# Patient Record
Sex: MALE | Race: WHITE | NOT HISPANIC OR LATINO | URBAN - METROPOLITAN AREA
[De-identification: names, ages, dates, MRNs, and addresses within clinical notes are randomized per-mention and may not be internally consistent; named-entity substitution may affect disease eponyms.]

---

## 2018-04-30 ENCOUNTER — TELEPHONE (OUTPATIENT)
Dept: TRANSPLANT | Facility: CLINIC | Age: 58
End: 2018-04-30

## 2018-04-30 DIAGNOSIS — Z00.5 TRANSPLANT DONOR EVALUATION: Primary | ICD-10-CM

## 2018-04-30 NOTE — TELEPHONE ENCOUNTER
"[employer] BREEZE  [id number]      LIVING KIDNEY DONOR EVALUATION  Donor First Name Willie Donor MRN    Donor Last Name Ladonna Completed 2018 4:36 PM    1960 Record ID [id number]   BREEZE Screen PASSED     Intended Recipient  Recipient First Name Kaiser Recipient MRN    Recipient Last Name Aeling Relationship Close Friend   Recipient  1944-10-19 Recipient Diagnosis    Recipient's ABO      Donor Information  Age 58 Gender Male   Ht 170 cm (5' 7'') Race Not Specified,    Wt 74.8 kg (165 lbs) Ethnicity Not /   BMI 25.90 kg/m  Preferred Language English      Required No     Blood Type Unknown   Demographics  Home Address [address] [place] [address] [address] [facility] # [phone]   [facility] Type [facility] Alternate #    [facility] Code [address] Type    Country [place] Preferred Contact day Mon, Fri, Thur, Wed, Tue   Email [email or web address] Preferred Contact time 11:00 AM-1:00 PM, 1:00 PM-4:00 PM   Donor's Medical Information  Medical History None Reported Medications None Reported   Surgical History Tonsillectomy and/or Adenoidectomy Allergies NKDA   Social History EtOH: Occasional (1-2 drinks/week)   Illicit Drug Use: Denies   Tobacco: Denies Self-Reported Functional Status \"I am able to participate in strenuous sports such as swimming, singles tennis, football, basketball, or skiing\"   Family Medical History Cancer (denies)   Diabetes (Sibling)   Heart Disease (denies)   Hypertension (Mother)   Kidney Disease (denies)   Kidney Stones (denies) Exercise Frequency Exercise (3 X per week)   Review of Organ Systems  Review of Systems Airway or Lungs: No   Blood Disorder: No   Cancer: No   Diabetes,Thyroid,Adrenal,Endocrine Disorder: No   Digestive or Liver: No   Heart or Circulatory System: No   Immune Diseases: No   Kidneys and Bladder: No   Male Health: No   Muscles,Bones,Joints: No   Neuro: No   Psych: No   Donor's Social Information  Marital Status Single Living " Accommodation Lives in rented accommodation   Level of Education Graduate or professional degree complete Living Arrangement Alone   Employment Status Self Employed Concerns: health and life insurance No   Employer  Concerns: job security and lost income No   Occupation      Medical Insurance Status Has medical insurance     High Risk Behavior  High Risk Behaviors Blood transfusion < 12 months. (NO)   Commercial sex < 12 months. (NO)   Illicit IV drug use < 5yrs. (NO)   Male:male sexual contact < 5yrs. (YES)   Other high risk sexual contact < 12 months. (NO)   Reason for Donation  Referral Self Researched Reason for Donation I would like to donate in order to help my friend Randy, so that it might prolong his life expantancy   Permission to Disclose Inquiry Yes Patient Comments not at this time   Donor Motivation Level Ready to start evaluation with reservations     PCP Contact  PCP Name    PCP [facility]    PCP State    PCP Phone    Emergency Contact  First Name Raquel First Name Yohannes   Last Name Jorge Last Name Francine   Phone # [phone] Phone # [phone]   Phone Type Mobile Phone Type Mobile   Relationship Friend or Other Relationship Friend or Other   Office Use  Reviewed By Gretel Palacio   Reviewed 4/30/2018 8:38 AM   Admin Folder Accept   Comments 4/30 - Passed Eval   Lost for Followup    Extended Comments    BREEZE ID [facility].transplant.combined:[id number] survey status completed   Activity History  Call  Task    Due Date 4/30/2018   Last Modified Date/Time 4/30/2018 11:41 AM   Comments    Call  Task    Due Date 4/30/2018   Last Modified Date/Time 4/30/2018 11:40 AM   Comments

## 2018-05-15 ENCOUNTER — TELEPHONE (OUTPATIENT)
Dept: TRANSPLANT | Facility: CLINIC | Age: 58
End: 2018-05-15

## 2018-05-15 ENCOUNTER — DOCUMENTATION ONLY (OUTPATIENT)
Dept: TRANSPLANT | Facility: CLINIC | Age: 58
End: 2018-05-15

## 2018-05-15 NOTE — PROGRESS NOTES
Willie called to say he is ready to move on toward doing his phase 12 testing.  He has an appointment and I explained that Lucila will review the labs and blood pressures and if everything is ok and recipient is ready for transplant we will invite him for eval.   Willie verbalized understanding.

## 2018-05-23 ENCOUNTER — DOCUMENTATION ONLY (OUTPATIENT)
Dept: TRANSPLANT | Facility: CLINIC | Age: 58
End: 2018-05-23

## 2018-05-23 ENCOUNTER — TELEPHONE (OUTPATIENT)
Dept: TRANSPLANT | Facility: CLINIC | Age: 58
End: 2018-05-23

## 2018-05-23 DIAGNOSIS — Z00.5 TRANSPLANT DONOR EVALUATION: ICD-10-CM

## 2018-05-23 NOTE — TELEPHONE ENCOUNTER
Informed Willie his Phase 1's are OK. Per recip coordinator it's OK to bring donor for eval. Has CD. [place] Has not left [place] in past 3 months.

## 2018-06-08 ENCOUNTER — OFFICE VISIT (OUTPATIENT)
Dept: TRANSPLANT | Facility: CLINIC | Age: 58
End: 2018-06-08
Attending: TRANSPLANT SURGERY

## 2018-06-08 ENCOUNTER — APPOINTMENT (OUTPATIENT)
Dept: TRANSPLANT | Facility: CLINIC | Age: 58
End: 2018-06-08
Attending: TRANSPLANT SURGERY

## 2018-06-08 ENCOUNTER — ALLIED HEALTH/NURSE VISIT (OUTPATIENT)
Dept: TRANSPLANT | Facility: CLINIC | Age: 58
End: 2018-06-08
Attending: TRANSPLANT SURGERY

## 2018-06-08 ENCOUNTER — INFUSION THERAPY VISIT (OUTPATIENT)
Dept: INFUSION THERAPY | Facility: CLINIC | Age: 58
End: 2018-06-08
Attending: INTERNAL MEDICINE

## 2018-06-08 ENCOUNTER — OFFICE VISIT (OUTPATIENT)
Dept: NEPHROLOGY | Facility: CLINIC | Age: 58
End: 2018-06-08
Attending: INTERNAL MEDICINE

## 2018-06-08 VITALS
WEIGHT: 169 LBS | OXYGEN SATURATION: 99 % | RESPIRATION RATE: 20 BRPM | DIASTOLIC BLOOD PRESSURE: 93 MMHG | TEMPERATURE: 97.7 F | BODY MASS INDEX: 27.16 KG/M2 | HEART RATE: 108 BPM | HEIGHT: 66 IN | SYSTOLIC BLOOD PRESSURE: 147 MMHG

## 2018-06-08 DIAGNOSIS — Z53.9 ERRONEOUS ENCOUNTER--DISREGARD: ICD-10-CM

## 2018-06-08 DIAGNOSIS — Z00.5 TRANSPLANT DONOR EVALUATION: ICD-10-CM

## 2018-06-08 DIAGNOSIS — R74.01 ELEVATED AST (SGOT): ICD-10-CM

## 2018-06-08 DIAGNOSIS — Z00.5 TRANSPLANT DONOR EVALUATION: Primary | ICD-10-CM

## 2018-06-08 DIAGNOSIS — R80.9 MICROALBUMINURIA: ICD-10-CM

## 2018-06-08 DIAGNOSIS — I10 BENIGN ESSENTIAL HYPERTENSION: ICD-10-CM

## 2018-06-08 LAB
ABO + RH BLD: NORMAL
ABO + RH BLD: NORMAL
ALBUMIN SERPL-MCNC: 4.5 G/DL (ref 3.4–5)
ALBUMIN UR-MCNC: NEGATIVE MG/DL
ALP SERPL-CCNC: 74 U/L (ref 40–150)
ALT SERPL W P-5'-P-CCNC: 40 U/L (ref 0–70)
ANION GAP SERPL CALCULATED.3IONS-SCNC: 11 MMOL/L (ref 3–14)
APPEARANCE UR: CLEAR
APTT PPP: 36 SEC (ref 22–37)
AST SERPL W P-5'-P-CCNC: 48 U/L (ref 0–45)
BILIRUB SERPL-MCNC: 0.9 MG/DL (ref 0.2–1.3)
BILIRUB UR QL STRIP: NEGATIVE
BLD GP AB SCN SERPL QL: NORMAL
BLOOD BANK CMNT PATIENT-IMP: NORMAL
BUN SERPL-MCNC: 16 MG/DL (ref 7–30)
CALCIUM SERPL-MCNC: 9 MG/DL (ref 8.5–10.1)
CHLORIDE SERPL-SCNC: 106 MMOL/L (ref 94–109)
CHOLEST SERPL-MCNC: 184 MG/DL
CMV IGG SERPL QL IA: <0.2 AI (ref 0–0.8)
CO2 SERPL-SCNC: 22 MMOL/L (ref 20–32)
COLOR UR AUTO: YELLOW
CREAT SERPL-MCNC: 0.9 MG/DL (ref 0.66–1.25)
CREAT UR-MCNC: 33 MG/DL
EBV VCA IGG SER QL IA: 7 AI (ref 0–0.8)
ERYTHROCYTE [DISTWIDTH] IN BLOOD BY AUTOMATED COUNT: 12.8 % (ref 10–15)
GFR SERPL CREATININE-BSD FRML MDRD: 87 ML/MIN/1.7M2
GLUCOSE SERPL-MCNC: 79 MG/DL (ref 70–99)
GLUCOSE UR STRIP-MCNC: NEGATIVE MG/DL
HBA1C MFR BLD: 5.2 % (ref 0–5.6)
HCT VFR BLD AUTO: 44.7 % (ref 40–53)
HDLC SERPL-MCNC: 96 MG/DL
HGB BLD-MCNC: 15.1 G/DL (ref 13.3–17.7)
HGB UR QL STRIP: NEGATIVE
INR PPP: 0.96 (ref 0.86–1.14)
KETONES UR STRIP-MCNC: 5 MG/DL
LDLC SERPL CALC-MCNC: 72 MG/DL
LEUKOCYTE ESTERASE UR QL STRIP: NEGATIVE
MCH RBC QN AUTO: 32.2 PG (ref 26.5–33)
MCHC RBC AUTO-ENTMCNC: 33.8 G/DL (ref 31.5–36.5)
MCV RBC AUTO: 95 FL (ref 78–100)
MICROALBUMIN UR-MCNC: 13 MG/L
MICROALBUMIN/CREAT UR: 39.33 MG/G CR (ref 0–17)
MUCOUS THREADS #/AREA URNS LPF: PRESENT /LPF
NITRATE UR QL: NEGATIVE
NONHDLC SERPL-MCNC: 88 MG/DL
PH UR STRIP: 6 PH (ref 5–7)
PHOSPHATE SERPL-MCNC: 3.1 MG/DL (ref 2.5–4.5)
PLATELET # BLD AUTO: 265 10E9/L (ref 150–450)
POTASSIUM SERPL-SCNC: 4 MMOL/L (ref 3.4–5.3)
PROT SERPL-MCNC: 8 G/DL (ref 6.8–8.8)
PROT UR-MCNC: <0.05 G/L
PROT/CREAT 24H UR: NORMAL G/G CR (ref 0–0.2)
PSA SERPL-ACNC: 3.42 UG/L (ref 0–4)
RBC # BLD AUTO: 4.69 10E12/L (ref 4.4–5.9)
RBC #/AREA URNS AUTO: 1 /HPF (ref 0–2)
SODIUM SERPL-SCNC: 140 MMOL/L (ref 133–144)
SOURCE: ABNORMAL
SP GR UR STRIP: 1.02 (ref 1–1.03)
SPECIMEN EXP DATE BLD: NORMAL
T PALLIDUM AB SER QL: NONREACTIVE
TRIGL SERPL-MCNC: 79 MG/DL
URATE SERPL-MCNC: 5.8 MG/DL (ref 3.5–7.2)
UROBILINOGEN UR STRIP-MCNC: 2 MG/DL (ref 0–2)
WBC # BLD AUTO: 7.8 10E9/L (ref 4–11)
WBC #/AREA URNS AUTO: 0 /HPF (ref 0–5)

## 2018-06-08 PROCEDURE — G0103 PSA SCREENING: HCPCS | Performed by: INTERNAL MEDICINE

## 2018-06-08 PROCEDURE — 87340 HEPATITIS B SURFACE AG IA: CPT | Performed by: INTERNAL MEDICINE

## 2018-06-08 PROCEDURE — 84156 ASSAY OF PROTEIN URINE: CPT | Performed by: INTERNAL MEDICINE

## 2018-06-08 PROCEDURE — 85027 COMPLETE CBC AUTOMATED: CPT | Performed by: INTERNAL MEDICINE

## 2018-06-08 PROCEDURE — 86480 TB TEST CELL IMMUN MEASURE: CPT | Performed by: INTERNAL MEDICINE

## 2018-06-08 PROCEDURE — 84550 ASSAY OF BLOOD/URIC ACID: CPT | Performed by: INTERNAL MEDICINE

## 2018-06-08 PROCEDURE — 86704 HEP B CORE ANTIBODY TOTAL: CPT | Performed by: INTERNAL MEDICINE

## 2018-06-08 PROCEDURE — 82043 UR ALBUMIN QUANTITATIVE: CPT | Performed by: INTERNAL MEDICINE

## 2018-06-08 PROCEDURE — 86780 TREPONEMA PALLIDUM: CPT | Performed by: INTERNAL MEDICINE

## 2018-06-08 PROCEDURE — 86850 RBC ANTIBODY SCREEN: CPT | Performed by: INTERNAL MEDICINE

## 2018-06-08 PROCEDURE — 83036 HEMOGLOBIN GLYCOSYLATED A1C: CPT | Performed by: INTERNAL MEDICINE

## 2018-06-08 PROCEDURE — 80061 LIPID PANEL: CPT | Performed by: INTERNAL MEDICINE

## 2018-06-08 PROCEDURE — 86803 HEPATITIS C AB TEST: CPT | Performed by: INTERNAL MEDICINE

## 2018-06-08 PROCEDURE — 86900 BLOOD TYPING SEROLOGIC ABO: CPT | Performed by: INTERNAL MEDICINE

## 2018-06-08 PROCEDURE — 80053 COMPREHEN METABOLIC PANEL: CPT | Performed by: INTERNAL MEDICINE

## 2018-06-08 PROCEDURE — 86665 EPSTEIN-BARR CAPSID VCA: CPT | Performed by: INTERNAL MEDICINE

## 2018-06-08 PROCEDURE — 86901 BLOOD TYPING SEROLOGIC RH(D): CPT | Performed by: INTERNAL MEDICINE

## 2018-06-08 PROCEDURE — G0463 HOSPITAL OUTPT CLINIC VISIT: HCPCS | Mod: ZF

## 2018-06-08 PROCEDURE — 36415 COLL VENOUS BLD VENIPUNCTURE: CPT | Performed by: INTERNAL MEDICINE

## 2018-06-08 PROCEDURE — 86706 HEP B SURFACE ANTIBODY: CPT | Performed by: INTERNAL MEDICINE

## 2018-06-08 PROCEDURE — 86644 CMV ANTIBODY: CPT | Performed by: INTERNAL MEDICINE

## 2018-06-08 PROCEDURE — 81001 URINALYSIS AUTO W/SCOPE: CPT | Performed by: INTERNAL MEDICINE

## 2018-06-08 PROCEDURE — 85730 THROMBOPLASTIN TIME PARTIAL: CPT | Performed by: INTERNAL MEDICINE

## 2018-06-08 PROCEDURE — 84100 ASSAY OF PHOSPHORUS: CPT | Performed by: INTERNAL MEDICINE

## 2018-06-08 PROCEDURE — 40000866 ZZHCL STATISTIC HIV 1/2 ANTIGEN/ANTIBODY PRETRANSPLANT ONLY: Performed by: INTERNAL MEDICINE

## 2018-06-08 PROCEDURE — 85610 PROTHROMBIN TIME: CPT | Performed by: INTERNAL MEDICINE

## 2018-06-08 RX ORDER — NICOTINE POLACRILEX 4 MG/1
20 GUM, CHEWING ORAL DAILY PRN
COMMUNITY
Start: 2018-05-16

## 2018-06-08 RX ORDER — LORAZEPAM 1 MG/1
0.5 TABLET ORAL EVERY 4 HOURS PRN
Refills: 0 | COMMUNITY
Start: 2018-05-03

## 2018-06-08 ASSESSMENT — PAIN SCALES - GENERAL: PAINLEVEL: NO PAIN (0)

## 2018-06-08 NOTE — LETTER
Date:June 11, 2018      Patient was self referred, no letter generated. Do not send.        [facility] Health Information

## 2018-06-08 NOTE — MR AVS SNAPSHOT
"              After Visit Summary   2018    Willie Dougherty    MRN: [id number]           Patient Information     Date Of Birth          1960        Visit Information        Provider Department      2018 11:30 AM [facility]        Today's Diagnoses     Transplant donor evaluation    -  1       Follow-ups after your visit        Who to contact     If you have questions or need follow up information about today's clinic visit or your schedule please contact [facility] SOLID ORGAN TRANSPLANT directly at [phone].  Normal or non-critical lab and imaging results will be communicated to you by [email or web address].comhart, letter or phone within 4 business days after the clinic has received the results. If you do not hear from us within 7 days, please contact the clinic through [email or web address] or phone. If you have a critical or abnormal lab result, we will notify you by phone as soon as possible.  Submit refill requests through [employer] or call your pharmacy and they will forward the refill request to us. Please allow 3 business days for your refill to be completed.          Additional Information About Your Visit        MyChart Information     [employer] lets you send messages to your doctor, view your test results, renew your prescriptions, schedule appointments and more. To sign up, go to [email or web address] . Click on \"Log in\" on the left side of the screen, which will take you to the Welcome page. Then click on \"Sign up Now\" on the right side of the page.     You will be asked to enter the access code listed below, as well as some personal information. Please follow the directions to create your username and password.     Your access code is: [email or web address]  Expires: 2018  6:30 AM     Your access code will  in 90 days. If you need help or a new code, please call your [facility] or [phone].        Care EveryWhere ID     This is your Care EveryWhere ID. This could be used by " other organizations to access your [place] medical records  [id number]         Blood Pressure from Last 3 Encounters:   06/08/18 (!) 147/93    Weight from Last 3 Encounters:   06/08/18 76.7 kg (169 lb)              Today, you had the following     No orders found for display       Primary Care Provider Fax #    Physician No Ref-Primary [phone]       No address on file        Equal Access to Services     [facility]: Hadii aad ku hadasho Soomaali, waaxda luqadaha, qaybta kaalmada adeegyada, waxay galain hayaan adeeg russell laAdriánjovani . So [facility] [phone].    ATENCIÓN: Si habla español, tiene a rome disposición servicios gratuitos de asistencia lingüística. Llame al [phone].    We comply with applicable federal civil rights laws and [place] laws. We do not discriminate on the basis of race, color, national origin, age, disability, sex, sexual orientation, or gender identity.            Thank you!     Thank you for choosing [facility] SOLID ORGAN TRANSPLANT  for your care. Our goal is always to provide you with excellent care. Hearing back from our patients is one way we can continue to improve our services. Please take a few minutes to complete the written survey that you may receive in the mail after your visit with us. Thank you!             Your Updated Medication List - Protect others around you: Learn how to safely use, store and throw away your medicines at www.disposemymeds.org.          This list is accurate as of 6/8/18 11:59 PM.  Always use your most recent med list.                   Brand Name Dispense Instructions for use Diagnosis    LORazepam 1 MG tablet    ATIVAN     Take 0.5 mg by mouth every 4 hours as needed        omeprazole 20 MG tablet      Take 20 mg by mouth daily as needed

## 2018-06-08 NOTE — MR AVS SNAPSHOT
After Visit Summary   6/8/2018    Willie Dougherty    MRN: [id number]           Patient Information     Date Of Birth          1960        Visit Information        Provider Department      6/8/2018 9:00 AM UC 49 ATC; UC SPEC INFUSION [facility] Specialty and Procedure        Today's Diagnoses     Transplant donor evaluation    -  1    ERRONEOUS ENCOUNTER--DISREGARD           Follow-ups after your visit        Your next 10 appointments already scheduled     Jun 08, 2018  3:20 PM CDT   XR CHEST 2 VIEWS with UCXR1   [facility] [place] [facility] ([facility] Clinics [facility] Center)    [address] [address] [phone]           Please bring a list of your current medicines to your exam. (Include vitamins, minerals and over-thecounter medicines.) Leave your valuables at home.  Tell your doctor if there is a chance you may be pregnant.  You do not need to do anything special for this exam.              Who to contact     If you have questions or need follow up information about today's clinic visit or your schedule please contact [facility] SPECIALTY AND PROCEDURE directly at [phone].  Normal or non-critical lab and imaging results will be communicated to you by iCare Technologyhart, letter or phone within 4 business days after the clinic has received the results. If you do not hear from us within 7 days, please contact the clinic through [employer] or phone. If you have a critical or abnormal lab result, we will notify you by phone as soon as possible.  Submit refill requests through [employer] or call your pharmacy and they will forward the refill request to us. Please allow 3 business days for your refill to be completed.          Additional Information About Your Visit        Perfect [employer] Information     [employer] lets you send messages to your doctor, view your test results, renew your prescriptions, schedule appointments and more. To  "sign up, go to [email or web address] . Click on \"Log in\" on the left side of the screen, which will take you to the Welcome page. Then click on \"Sign up Now\" on the right side of the page.     You will be asked to enter the access code listed below, as well as some personal information. Please follow the directions to create your username and password.     Your access code is: [email or web address]  Expires: 2018  6:30 AM     Your access code will  in 90 days. If you need help or a new code, please call your [place] clinic or [phone].        Care EveryWhere ID     This is your Care EveryWhere ID. This could be used by other organizations to access your [place] medical records  [id number]         Blood Pressure from Last 3 Encounters:   18 (!) 147/93    Weight from Last 3 Encounters:   18 76.7 kg (169 lb)              Today, you had the following     No orders found for display       Primary Care Provider Fax #    Physician No Ref-Primary [phone]       No address on file        Equal Access to Services     [facility]: Hadii [address] Soalla, wafranciscoda kavya, qaybta kaalmaranulfo ordoñez, flako hall . So [facility] [phone].    ATENCIÓN: Si habla español, tiene a rome disposición servicios gratuitos de asistencia lingüística. Delroy al [phone].    We comply with applicable federal civil rights laws and [place] laws. We do not discriminate on the basis of race, color, national origin, age, disability, sex, sexual orientation, or gender identity.            Thank you!     Thank you for choosing [facility] SPECIALTY AND PROCEDURE  for your care. Our goal is always to provide you with excellent care. Hearing back from our patients is one way we can continue to improve our services. Please take a few minutes to complete the written survey that you may receive in the mail after your visit with us. Thank you!             Your Updated " Medication List - Protect others around you: Learn how to safely use, store and throw away your medicines at www.disposemymeds.org.          This list is accurate as of 6/8/18  3:05 PM.  Always use your most recent med list.                   Brand Name Dispense Instructions for use Diagnosis    LORazepam 1 MG tablet    ATIVAN     Take 0.5 mg by mouth every 4 hours as needed        omeprazole 20 MG tablet      Take 20 mg by mouth daily as needed

## 2018-06-08 NOTE — MR AVS SNAPSHOT
After Visit Summary   6/8/2018    Willie Dougherty    MRN: [id number]           Patient Information     Date Of Birth          1960        Visit Information        Provider Department      6/8/2018 1:00 PM Rebecca Joseph RD [facility] Solid Organ Transplant        Today's Diagnoses     Transplant donor evaluation    -  1       Follow-ups after your visit        Your next 10 appointments already scheduled     Jun 08, 2018  1:00 PM CDT   NUTRITION VISIT with Rebecca Joseph RD   [facility] Solid Organ Transplant ([facility])    [address]  [address]  [facility] [address]   [phone]            Jun 08, 2018  1:30 PM CDT   (Arrive by 1:15 PM)   SOT CARE COORDINATOR EVAL with Jennifer Collins RN   [facility] Solid Organ Transplant ([facility])    [address]  [address]  [place] [address]   [phone]            Jun 08, 2018  2:00 PM CDT   (Arrive by 1:30 PM)   Kidney Donor Evaluation with Raymond Carlson MD   [facility] Nephrology ([facility])    [address]  [address]  [facility] [address]   [phone]            Jun 08, 2018  3:00 PM CDT   CT RENAL ANGIO with UCCT1   [facility] Imaging [place] ([facility])    [address]  1st Floor  [place] [address]   [phone]           Please bring any scans or X-rays taken at other hospitals, if similar tests were done. Also bring a list of your medicines, including vitamins, minerals and over-the-counter drugs. It is safest to leave personal items at home.  Be sure to tell your doctor:   If you have any allergies.   If there s any chance you are pregnant.   If you are breastfeeding.    If you have diabetes as your medication may need to be adjusted for this exam.  You will have contrast for this exam. To prepare:   Do not eat or drink for 2 hours before your exam. If you need to take  medicine, you may take it with small sips of water. (We may ask you to take liquid medicine as well.)   The day before your exam, drink extra fluids at least six 8-ounce glasses (unless your doctor tells you to restrict your fluids).  Patients over 70 or patients with diabetes or kidney problems:   If you haven t had a blood test (creatinine test) within the last 30 days, the Cardiologist/Radiologist may require you to get this test prior to your exam.  Please wear loose clothing, such as a sweat suit or jogging clothes. Avoid snaps, zippers and other metal. We may ask you to undress and put on a hospital gown.  If you have any questions, please call the Imaging Department where you will have your exam.            Jun 08, 2018  3:20 PM CDT   XR CHEST 2 VIEWS with UCXR1   [facility] ([facility])    [address] [address] [phone]           Please bring a list of your current medicines to your exam. (Include vitamins, minerals and over-thecounter medicines.) Leave your valuables at home.  Tell your doctor if there is a chance you may be pregnant.  You do not need to do anything special for this exam.              Who to contact     If you have questions or need follow up information about today's clinic visit or your schedule please contact [facility] SOLID ORGAN TRANSPLANT directly at [phone].  Normal or non-critical lab and imaging results will be communicated to you by MyChart, letter or phone within 4 business days after the clinic has received the results. If you do not hear from us within 7 days, please contact the clinic through MyChart or phone. If you have a critical or abnormal lab result, we will notify you by phone as soon as possible.  Submit refill requests through [facility] or call your pharmacy and they will forward the refill request to us. Please allow 3 business days for your refill to be completed.           "Additional Information About Your Visit        MyChart Information     [employer] lets you send messages to your doctor, view your test results, renew your prescriptions, schedule appointments and more. To sign up, go to [email or web address] . Click on \"Log in\" on the left side of the screen, which will take you to the Welcome page. Then click on \"Sign up Now\" on the right side of the page.     You will be asked to enter the access code listed below, as well as some personal information. Please follow the directions to create your username and password.     Your access code is: [email or web address]  Expires: 2018  6:30 AM     Your access code will  in 90 days. If you need help or a new code, please call your [place] clinic or [phone].        Care EveryWhere ID     This is your Care EveryWhere ID. This could be used by other organizations to access your [place] medical records  [id number]         Blood Pressure from Last 3 Encounters:   No data found for BP    Weight from Last 3 Encounters:   No data found for Wt              Today, you had the following     No orders found for display       Primary Care Provider Fax #    Physician No Ref-Primary [phone]       No address on file        Equal Access to Services     EDUARDO MURILLO : Yris Nava, wafranciscoda kavya, qaybta kaalmada aderominayaranulfo, flako hall . So [facility] [phone].    ATENCIÓN: Si habla español, tiene a rome disposición servicios gratuitos de asistencia lingüística. Llame al [phone].    We comply with applicable federal civil rights laws and [place] laws. We do not discriminate on the basis of race, color, national origin, age, disability, sex, sexual orientation, or gender identity.            Thank you!     Thank you for choosing [facility] SOLID ORGAN TRANSPLANT  for your care. Our goal is always to provide you with excellent care. Hearing back from our patients is one way we can continue to " improve our services. Please take a few minutes to complete the written survey that you may receive in the mail after your visit with us. Thank you!             Your Updated Medication List - Protect others around you: Learn how to safely use, store and throw away your medicines at www.disposemymeds.org.          This list is accurate as of 6/8/18 12:55 PM.  Always use your most recent med list.                   Brand Name Dispense Instructions for use Diagnosis    LORazepam 1 MG tablet    ATIVAN     Take 0.5 mg by mouth every 4 hours as needed        omeprazole 20 MG tablet      Take 20 mg by mouth daily as needed

## 2018-06-08 NOTE — MR AVS SNAPSHOT
"              After Visit Summary   2018    Willie Dougherty    MRN: [id number]           Patient Information     Date Of Birth          1960        Visit Information        Provider Department      2018 10:30 AM Jamie Riggins MD [facility] Solid Organ Transplant        Today's Diagnoses     Transplant donor evaluation    -  1       Follow-ups after your visit        Who to contact     If you have questions or need follow up information about today's clinic visit or your schedule please contact [facility] SOLID ORGAN TRANSPLANT directly at [phone].  Normal or non-critical lab and imaging results will be communicated to you by [employer]hart, letter or phone within 4 business days after the clinic has received the results. If you do not hear from us within 7 days, please contact the clinic through [employer] or phone. If you have a critical or abnormal lab result, we will notify you by phone as soon as possible.  Submit refill requests through [employer] or call your pharmacy and they will forward the refill request to us. Please allow 3 business days for your refill to be completed.          Additional Information About Your Visit        MyChart Information     [employer] lets you send messages to your doctor, view your test results, renew your prescriptions, schedule appointments and more. To sign up, go to [email or web address] . Click on \"Log in\" on the left side of the screen, which will take you to the Welcome page. Then click on \"Sign up Now\" on the right side of the page.     You will be asked to enter the access code listed below, as well as some personal information. Please follow the directions to create your username and password.     Your access code is: [email or web address]  Expires: 2018  6:30 AM     Your access code will  in 90 days. If you need help or a new code, please call your [place] clinic or [phone].        Care EveryWhere ID     This is your Care EveryWhere ID. This could be used by other " organizations to access your [place] medical records  [id number]         Blood Pressure from Last 3 Encounters:   06/08/18 (!) 147/93    Weight from Last 3 Encounters:   06/08/18 76.7 kg (169 lb)              Today, you had the following     No orders found for display       Primary Care Provider Fax #    Physician No Ref-Primary [phone]       No address on file        Equal Access to Services     [facility]: Hadii aad ku hadasho Soomaali, waaxda luqadaha, qaybta kaalmada adeegyada, waxay galain haymarisn aderomina russell laAdriánjovani . So [facility] [phone].    ATENCIÓN: Si habla español, tiene a rome disposición servicios gratuitos de asistencia lingüística. Llame al [phone].    We comply with applicable federal civil rights laws and [place] laws. We do not discriminate on the basis of race, color, national origin, age, disability, sex, sexual orientation, or gender identity.            Thank you!     Thank you for choosing [facility] SOLID ORGAN TRANSPLANT  for your care. Our goal is always to provide you with excellent care. Hearing back from our patients is one way we can continue to improve our services. Please take a few minutes to complete the written survey that you may receive in the mail after your visit with us. Thank you!             Your Updated Medication List - Protect others around you: Learn how to safely use, store and throw away your medicines at www.disposemymeds.org.          This list is accurate as of 6/8/18  3:39 PM.  Always use your most recent med list.                   Brand Name Dispense Instructions for use Diagnosis    LORazepam 1 MG tablet    ATIVAN     Take 0.5 mg by mouth every 4 hours as needed        omeprazole 20 MG tablet      Take 20 mg by mouth daily as needed

## 2018-06-08 NOTE — PROGRESS NOTES
Assessment and Plan:  1. Prospective kidney transplant donor with one issue that needs to be addressed prior to donation. Patient's blood pressure is elevated at this visit, kidney function appears to be acceptable with Iohexol needing to be performed, and urinalysis has microalbuminuria.    Microalbuminuria: The patient has 39 mg/g of creatinine albuminuria present today.  He will need this repeated in 6 weeks to be able to see if it is persistent.  If persistent I would not recommend that this patient to proceed with donation.    Hypertension: Patient's blood pressure on evaluation with serial evaluations shows an elevation to 153/95.  I have recommended that the patient follow-up with his primary care provider to have this treated if it continues to be persistent.  I have recommended that the patient is a 24 hour ambulatory blood pressure monitor to evaluate his blood pressures more carefully.      Elevated AST: This is mildly increased with the upper limit of normal for our lab being 45.  It is 48 today.  He has had no elevations in the past per his report.  I would recommend repeating his liver function tests in 6 weeks.  If persistent elevation of AST I would evaluate for any liver abnormality with a right upper quadrant ultrasound.  This can be done by his primary care provider.    I spent 45 minutes with this patient of which greater than 25 minutes was spent counseling regarding the risk benefits and alternatives of being a live kidney donor.  All questions were answered to the best my ability.    Discussed the risks of donating a kidney, including the surgical risk and the possible risks of living with one kidney.    Education about expected post-donation kidney function and how chronic kidney disease (CKD) and end stage kidney disease (ESKD) might potentially impact the donor in the future, include, but not limited to:       - On average, donors will have 25-35% permanent loss of kidney function at  donation.       - Baseline risk of ESKD may slightly exceed that of members of the general population with the same demographic profile.       - Donor risks must be interpreted in light of known epidemiology of both CKD or ESKD, such as that CKD generally develops in midlife (40-50 years old) and ESKD generally develops after age 60.       - Medical evaluation of young potential donors cannot predict lifetime risk of CKD or ESKD.       - Donors may be at higher risk for CKD if they sustain damage to the remaining kidney.       - Development of CKD and progression of ESKD may be more rapid with only 1 kidney.       - Some type of kidney replacement therapy, either kidney transplant or dialysis, is required when reaching ESKD.    Potential medical or surgical risks include, but not limited to:       - Death.       - Scars, pain, fatigue, and other consequences typical of any surgical procedure.       - Decreased kidney function.       - Abdominal or bowel symptoms, such as bloating and nausea, and developing bowel obstruction.       - Kidney failure (ESKD) and the need for a kidney transplant or dialysis for the donor.       - Impact of obesity, hypertension, or other donor-specific medical conditions on morbidity and mortality of the potential donor.    Patients overall evaluation will be discussed with the transplant team and a final recommendation on the patients' suitability to be a kidney transplant donor will be made at that time.    Consult:  Willie Dougherty was seen in consultation at the request of Dr. Jamie Riggins for evaluation as a potential kidney transplant donor.    Reason for Visit:  Willie Dougherty is a 58 year old male who presents for a kidney donor evaluation.  Patient would like to donate to a friend.    HPI:    Patient has GERD on omeprazole. No other medications or medical problems. Tonsillectomy in childhood. NKDA.     No known family hx of kidney disease. No cigs, etoh - 0-16 / month.no illicits.      Today, he feels tired.     No ha, vision problems. No cp, sob, no dysphagia. No abdominal pain. No dysuria. No hematuria. Sensitive skin. No n/v. No constipation or diarrhea. No f/s/c. Weight stable. Knee pain - uses physical therapy. No myalgias. No numbness, weakness or tingling.     The patient has never been told that he has protein in his urine in the past.  He does have microalbuminuria on today's evaluation.    The patient has mildly increased AST on the laboratory evaluation today.  The patient notes no increased alcohol intake and has had no changes in terms of his medications.  No known liver disease in the past.    Patient's blood pressure today is been elevated on 1 of the 3 evaluation blood pressures.  He has had no hypertension in the past per his recall.  He notes that his blood pressure is likely elevated today due to stress.         Kidney Disease Hx:       h/o Kidney Problems: No  Family h/o Genetic Kidney Disease: No       h/o HTN: No    Usual BP: Not checked       h/o Protein in Urine: No  h/o Blood in Urine: No       h/o Kidney Stones: No  h/o Kidney Injury: No       h/o Recurrent UTI: No  h/o Genitourinary Problems: No       h/o Chronic NSAID Use: No         Other Medical Hx:       h/o DM: No   h/o Gestational DM: Not applicable       h/o Preeclampsia: Not applicable          h/o Gastrointestinal, Pancreas or Liver Problems: No       h/o Lung or Heart Problems: No       h/o Hematologic Problems: No  h/o Bleeding or Clotting Problems: No       h/o Cancer: No       h/o Infection Problems: No         Skin Cancer Risk:       h/o more than 50 moles: No       h/o extensive sun exposure: Yes        h/o melanoma: No       Family h/o melanoma: No         Mental Health Assessment:       h/o Depression: Yes - no meds. No hospitalization.       h/o Psychiatric Illness: No       h/o Suicidal Attempt(s): Yes: 30 years ago.  suicidality without attempt.    ROS:   A comprehensive review of systems was  "obtained and negative, except as noted in the HPI or PMH.    PMH:   History was taken from the patient as noted below.  No past medical history on file.    PSH:   No past surgical history on file.  Personal or family history of anesthesia problems: No    Family Hx:   No family history on file.       Specific Family Hx:       FH of DM: Yes brother       FH of CAD: Yes Father  FH of HTN: Yes Mother       FH of Cancer: Yes PGM lung cancer; MGF leukemia  FH of Kidney Cancer: No    Personal Hx:   Social History     Social History     Marital status: Single     Spouse name: N/A     Number of children: N/A     Years of education: N/A     Occupational History     Not on file.     Social History Main Topics     Smoking status: Passive Smoke Exposure - Never Smoker     Types: Cigarettes     Smokeless tobacco: Never Used      Comment: Very rarely takes a puff     Alcohol use Yes      Comment: Occasional- 1x weekly     Drug use: No     Sexual activity: Not on file     Other Topics Concern     Not on file     Social History Narrative     No narrative on file          Specific Social Hx:       Health Insurance Status: Yes       Employment Status: Full time  Occupation: writer                       Living Arrangements: lives alone       Social Support: Yes       Presence of increased risk for disease transmission behaviors as defined by PHS guidelines: No        Allergies:  No Known Allergies    Medications:  Prior to Admission medications    Medication Sig Start Date End Date Taking? Authorizing Provider   LORazepam (ATIVAN) 1 MG tablet Take 0.5 mg by mouth every 4 hours as needed 5/3/18  Yes Reported, Patient   omeprazole 20 MG tablet Take 20 mg by mouth daily as needed 5/16/18  Yes Reported, Patient       Vitals:  Vital Signs 6/8/2018 6/8/2018 6/8/2018   Systolic - 137 153   Diastolic - 90 95   Pulse - 117 105   Temperature - 97.7 -   Respirations - 20 -   Weight (LB) - 169 lb -   Height - 5' 6.142\" -   BMI (Calculated) - 27.22 " -   Pain 0 - -   O2 - 99 -       Exam:   GENERAL APPEARANCE: alert and no distress  HENT: mouth without ulcers or lesions  LYMPHATICS: no cervical or supraclavicular nodes  RESP: lungs clear to auscultation - no rales, rhonchi or wheezes  CV: regular rhythm, normal rate, no rub, no murmur  EDEMA: no LE edema bilaterally  ABDOMEN: soft, nondistended, nontender, bowel sounds normal  MS: extremities normal - no gross deformities noted, no evidence of inflammation in joints, no muscle tenderness  SKIN: no rash    Results:   Labs and imaging were ordered for this visit and reviewed by me.  Recent Results (from the past 336 hour(s))   EKG 12-lead complete w/read - Clinics    Collection Time: 06/08/18  8:47 AM   Result Value Ref Range    Interpretation ECG Click View Image link to view waveform and result    Lipid Profile    Collection Time: 06/08/18 11:06 AM   Result Value Ref Range    Cholesterol 184 <200 mg/dL    Triglycerides 79 <150 mg/dL    HDL Cholesterol 96 >39 mg/dL    LDL Cholesterol Calculated 72 <100 mg/dL    Non HDL Cholesterol 88 <130 mg/dL   Comprehensive metabolic panel    Collection Time: 06/08/18 11:06 AM   Result Value Ref Range    Sodium 140 133 - 144 mmol/L    Potassium 4.0 3.4 - 5.3 mmol/L    Chloride 106 94 - 109 mmol/L    Carbon Dioxide 22 20 - 32 mmol/L    Anion Gap 11 3 - 14 mmol/L    Glucose 79 70 - 99 mg/dL    Urea Nitrogen 16 7 - 30 mg/dL    Creatinine 0.90 0.66 - 1.25 mg/dL    GFR Estimate 87 >60 mL/min/1.7m2    GFR Estimate If Black >90 >60 mL/min/1.7m2    Calcium 9.0 8.5 - 10.1 mg/dL    Bilirubin Total 0.9 0.2 - 1.3 mg/dL    Albumin 4.5 3.4 - 5.0 g/dL    Protein Total 8.0 6.8 - 8.8 g/dL    Alkaline Phosphatase 74 40 - 150 U/L    ALT 40 0 - 70 U/L    AST 48 (H) 0 - 45 U/L   Phosphorus    Collection Time: 06/08/18 11:06 AM   Result Value Ref Range    Phosphorus 3.1 2.5 - 4.5 mg/dL   Prostate spec antigen screen    Collection Time: 06/08/18 11:06 AM   Result Value Ref Range    PSA 3.42 0 - 4  ug/L   Hemoglobin A1c    Collection Time: 06/08/18 11:06 AM   Result Value Ref Range    Hemoglobin A1C 5.2 0 - 5.6 %   INR    Collection Time: 06/08/18 11:06 AM   Result Value Ref Range    INR 0.96 0.86 - 1.14   Partial thromboplastin time    Collection Time: 06/08/18 11:06 AM   Result Value Ref Range    PTT 36 22 - 37 sec   CBC with platelets    Collection Time: 06/08/18 11:06 AM   Result Value Ref Range    WBC 7.8 4.0 - 11.0 10e9/L    RBC Count 4.69 4.4 - 5.9 10e12/L    Hemoglobin 15.1 13.3 - 17.7 g/dL    Hematocrit 44.7 40.0 - 53.0 %    MCV 95 78 - 100 fl    MCH 32.2 26.5 - 33.0 pg    MCHC 33.8 31.5 - 36.5 g/dL    RDW 12.8 10.0 - 15.0 %    Platelet Count 265 150 - 450 10e9/L   Uric acid    Collection Time: 06/08/18 11:06 AM   Result Value Ref Range    Uric Acid 5.8 3.5 - 7.2 mg/dL   ABO/Rh type and screen    Collection Time: 06/08/18 11:07 AM   Result Value Ref Range    ABO A     RH(D) Pos     Antibody Screen Neg     Test Valid Only At          [facility]    Specimen Expires 06/11/2018    Routine UA with microscopic    Collection Time: 06/08/18 12:20 PM   Result Value Ref Range    Color Urine Yellow     Appearance Urine Clear     Glucose Urine Negative NEG^Negative mg/dL    Bilirubin Urine Negative NEG^Negative    Ketones Urine 5 (A) NEG^Negative mg/dL    Specific Gravity Urine 1.016 1.003 - 1.035    Blood Urine Negative NEG^Negative    pH Urine 6.0 5.0 - 7.0 pH    Protein Albumin Urine Negative NEG^Negative mg/dL    Urobilinogen mg/dL 2.0 0.0 - 2.0 mg/dL    Nitrite Urine Negative NEG^Negative    Leukocyte Esterase Urine Negative NEG^Negative    Source Midstream Urine     WBC Urine 0 0 - 5 /HPF    RBC Urine 1 0 - 2 /HPF    Mucous Urine Present (A) NEG^Negative /LPF   Albumin Random Urine Quantitative with Creat Ratio    Collection Time: 06/08/18 12:20 PM   Result Value Ref Range    Creatinine Urine 33 mg/dL    Albumin Urine mg/L 13 mg/L    Albumin Urine mg/g Cr 39.33  (H) 0 - 17 mg/g Cr   Protein  random urine with Creat Ratio    Collection Time: 06/08/18 12:20 PM   Result Value Ref Range    Protein Random Urine <0.05 g/L    Protein Total Urine g/gr Creatinine Unable to calculate due to low value 0 - 0.2 g/g Cr

## 2018-06-08 NOTE — LETTER
6/8/2018       RE: Willie Dougherty  [address]  [place] [address]     Dear Colleague,    Thank you for referring your patient, Willie Dougherty, to the [facility] at [facility]. Please see a copy of my visit note below.    Assessment and Plan:  1. Prospective kidney transplant donor with one issue that needs to be addressed prior to donation. Patient's blood pressure is elevated at this visit, kidney function appears to be acceptable with Iohexol needing to be performed, and urinalysis has microalbuminuria.    Microalbuminuria: The patient has 39 mg/g of creatinine albuminuria present today.  He will need this repeated in 6 weeks to be able to see if it is persistent.  If persistent I would not recommend that this patient to proceed with donation.    Hypertension: Patient's blood pressure on evaluation with serial evaluations shows an elevation to 153/95.  I have recommended that the patient follow-up with his primary care provider to have this treated if it continues to be persistent.  I have recommended that the patient is a 24 hour ambulatory blood pressure monitor to evaluate his blood pressures more carefully.      Elevated AST: This is mildly increased with the upper limit of normal for our lab being 45.  It is 48 today.  He has had no elevations in the past per his report.  I would recommend repeating his liver function tests in 6 weeks.  If persistent elevation of AST I would evaluate for any liver abnormality with a right upper quadrant ultrasound.  This can be done by his primary care provider.    I spent 45 minutes with this patient of which greater than 25 minutes was spent counseling regarding the risk benefits and alternatives of being a live kidney donor.  All questions were answered to the best my ability.    Discussed the risks of donating a kidney, including the surgical risk and the possible risks of living with one kidney.    Education about  expected post-donation kidney function and how chronic kidney disease (CKD) and end stage kidney disease (ESKD) might potentially impact the donor in the future, include, but not limited to:       - On average, donors will have 25-35% permanent loss of kidney function at donation.       - Baseline risk of ESKD may slightly exceed that of members of the general population with the same demographic profile.       - Donor risks must be interpreted in light of known epidemiology of both CKD or ESKD, such as that CKD generally develops in midlife (40-50 years old) and ESKD generally develops after age 60.       - Medical evaluation of young potential donors cannot predict lifetime risk of CKD or ESKD.       - Donors may be at higher risk for CKD if they sustain damage to the remaining kidney.       - Development of CKD and progression of ESKD may be more rapid with only 1 kidney.       - Some type of kidney replacement therapy, either kidney transplant or dialysis, is required when reaching ESKD.    Potential medical or surgical risks include, but not limited to:       - Death.       - Scars, pain, fatigue, and other consequences typical of any surgical procedure.       - Decreased kidney function.       - Abdominal or bowel symptoms, such as bloating and nausea, and developing bowel obstruction.       - Kidney failure (ESKD) and the need for a kidney transplant or dialysis for the donor.       - Impact of obesity, hypertension, or other donor-specific medical conditions on morbidity and mortality of the potential donor.    Patients overall evaluation will be discussed with the transplant team and a final recommendation on the patients' suitability to be a kidney transplant donor will be made at that time.    Consult:  Willie Dougherty was seen in consultation at the request of Dr. Jamie Riggins for evaluation as a potential kidney transplant donor.    Reason for Visit:  Willie Dougherty is a 58 year old male who presents for a  kidney donor evaluation.  Patient would like to donate to a friend.    HPI:    Patient has GERD on omeprazole. No other medications or medical problems. Tonsillectomy in childhood. NKDA.     No known family hx of kidney disease. No cigs, etoh - 0-16 / month.no illicits.     Today, he feels tired.     No ha, vision problems. No cp, sob, no dysphagia. No abdominal pain. No dysuria. No hematuria. Sensitive skin. No n/v. No constipation or diarrhea. No f/s/c. Weight stable. Knee pain - uses physical therapy. No myalgias. No numbness, weakness or tingling.     The patient has never been told that he has protein in his urine in the past.  He does have microalbuminuria on today's evaluation.    The patient has mildly increased AST on the laboratory evaluation today.  The patient notes no increased alcohol intake and has had no changes in terms of his medications.  No known liver disease in the past.    Patient's blood pressure today is been elevated on 1 of the 3 evaluation blood pressures.  He has had no hypertension in the past per his recall.  He notes that his blood pressure is likely elevated today due to stress.         Kidney Disease Hx:       h/o Kidney Problems: No  Family h/o Genetic Kidney Disease: No       h/o HTN: No    Usual BP: Not checked       h/o Protein in Urine: No  h/o Blood in Urine: No       h/o Kidney Stones: No  h/o Kidney Injury: No       h/o Recurrent UTI: No  h/o Genitourinary Problems: No       h/o Chronic NSAID Use: No         Other Medical Hx:       h/o DM: No   h/o Gestational DM: Not applicable       h/o Preeclampsia: Not applicable          h/o Gastrointestinal, Pancreas or Liver Problems: No       h/o Lung or Heart Problems: No       h/o Hematologic Problems: No  h/o Bleeding or Clotting Problems: No       h/o Cancer: No       h/o Infection Problems: No         Skin Cancer Risk:       h/o more than 50 moles: No       h/o extensive sun exposure: Yes        h/o melanoma: No       Family h/o  melanoma: No         Mental Health Assessment:       h/o Depression: Yes - no meds. No hospitalization.       h/o Psychiatric Illness: No       h/o Suicidal Attempt(s): Yes: 30 years ago.  suicidality without attempt.    ROS:   A comprehensive review of systems was obtained and negative, except as noted in the HPI or PMH.    PMH:   History was taken from the patient as noted below.  No past medical history on file.    PSH:   No past surgical history on file.  Personal or family history of anesthesia problems: No    Family Hx:   No family history on file.       Specific Family Hx:       FH of DM: Yes brother       FH of CAD: Yes Father  FH of HTN: Yes Mother       FH of Cancer: Yes PGM lung cancer; MGF leukemia  FH of Kidney Cancer: No    Personal Hx:   Social History     Social History     Marital status: Single     Spouse name: N/A     Number of children: N/A     Years of education: N/A     Occupational History     Not on file.     Social History Main Topics     Smoking status: Passive Smoke Exposure - Never Smoker     Types: Cigarettes     Smokeless tobacco: Never Used      Comment: Very rarely takes a puff     Alcohol use Yes      Comment: Occasional- 1x weekly     Drug use: No     Sexual activity: Not on file     Other Topics Concern     Not on file     Social History Narrative     No narrative on file          Specific Social Hx:       Health Insurance Status: Yes       Employment Status: Full time  Occupation: writer                       Living Arrangements: lives alone       Social Support: Yes       Presence of increased risk for disease transmission behaviors as defined by PHS guidelines: No        Allergies:  No Known Allergies    Medications:  Prior to Admission medications    Medication Sig Start Date End Date Taking? Authorizing Provider   LORazepam (ATIVAN) 1 MG tablet Take 0.5 mg by mouth every 4 hours as needed 5/3/18  Yes Reported, Patient   omeprazole 20 MG tablet Take 20 mg by mouth daily as  "needed 5/16/18  Yes Reported, Patient       Vitals:  Vital Signs 6/8/2018 6/8/2018 6/8/2018   Systolic - 137 153   Diastolic - 90 95   Pulse - 117 105   Temperature - 97.7 -   Respirations - 20 -   Weight (LB) - 169 lb -   Height - 5' 6.142\" -   BMI (Calculated) - 27.22 -   Pain 0 - -   O2 - 99 -       Exam:   GENERAL APPEARANCE: alert and no distress  HENT: mouth without ulcers or lesions  LYMPHATICS: no cervical or supraclavicular nodes  RESP: lungs clear to auscultation - no rales, rhonchi or wheezes  CV: regular rhythm, normal rate, no rub, no murmur  EDEMA: no LE edema bilaterally  ABDOMEN: soft, nondistended, nontender, bowel sounds normal  MS: extremities normal - no gross deformities noted, no evidence of inflammation in joints, no muscle tenderness  SKIN: no rash    Results:   Labs and imaging were ordered for this visit and reviewed by me.  Recent Results (from the past 336 hour(s))   EKG 12-lead complete w/read - Clinics    Collection Time: 06/08/18  8:47 AM   Result Value Ref Range    Interpretation ECG Click View Image link to view waveform and result    Lipid Profile    Collection Time: 06/08/18 11:06 AM   Result Value Ref Range    Cholesterol 184 <200 mg/dL    Triglycerides 79 <150 mg/dL    HDL Cholesterol 96 >39 mg/dL    LDL Cholesterol Calculated 72 <100 mg/dL    Non HDL Cholesterol 88 <130 mg/dL   Comprehensive metabolic panel    Collection Time: 06/08/18 11:06 AM   Result Value Ref Range    Sodium 140 133 - 144 mmol/L    Potassium 4.0 3.4 - 5.3 mmol/L    Chloride 106 94 - 109 mmol/L    Carbon Dioxide 22 20 - 32 mmol/L    Anion Gap 11 3 - 14 mmol/L    Glucose 79 70 - 99 mg/dL    Urea Nitrogen 16 7 - 30 mg/dL    Creatinine 0.90 0.66 - 1.25 mg/dL    GFR Estimate 87 >60 mL/min/1.7m2    GFR Estimate If Black >90 >60 mL/min/1.7m2    Calcium 9.0 8.5 - 10.1 mg/dL    Bilirubin Total 0.9 0.2 - 1.3 mg/dL    Albumin 4.5 3.4 - 5.0 g/dL    Protein Total 8.0 6.8 - 8.8 g/dL    Alkaline Phosphatase 74 40 - 150 U/L "    ALT 40 0 - 70 U/L    AST 48 (H) 0 - 45 U/L   Phosphorus    Collection Time: 06/08/18 11:06 AM   Result Value Ref Range    Phosphorus 3.1 2.5 - 4.5 mg/dL   Prostate spec antigen screen    Collection Time: 06/08/18 11:06 AM   Result Value Ref Range    PSA 3.42 0 - 4 ug/L   Hemoglobin A1c    Collection Time: 06/08/18 11:06 AM   Result Value Ref Range    Hemoglobin A1C 5.2 0 - 5.6 %   INR    Collection Time: 06/08/18 11:06 AM   Result Value Ref Range    INR 0.96 0.86 - 1.14   Partial thromboplastin time    Collection Time: 06/08/18 11:06 AM   Result Value Ref Range    PTT 36 22 - 37 sec   CBC with platelets    Collection Time: 06/08/18 11:06 AM   Result Value Ref Range    WBC 7.8 4.0 - 11.0 10e9/L    RBC Count 4.69 4.4 - 5.9 10e12/L    Hemoglobin 15.1 13.3 - 17.7 g/dL    Hematocrit 44.7 40.0 - 53.0 %    MCV 95 78 - 100 fl    MCH 32.2 26.5 - 33.0 pg    MCHC 33.8 31.5 - 36.5 g/dL    RDW 12.8 10.0 - 15.0 %    Platelet Count 265 150 - 450 10e9/L   Uric acid    Collection Time: 06/08/18 11:06 AM   Result Value Ref Range    Uric Acid 5.8 3.5 - 7.2 mg/dL   ABO/Rh type and screen    Collection Time: 06/08/18 11:07 AM   Result Value Ref Range    ABO A     RH(D) Pos     Antibody Screen Neg     Test Valid Only At          [facility]    Specimen Expires 06/11/2018    Routine UA with microscopic    Collection Time: 06/08/18 12:20 PM   Result Value Ref Range    Color Urine Yellow     Appearance Urine Clear     Glucose Urine Negative NEG^Negative mg/dL    Bilirubin Urine Negative NEG^Negative    Ketones Urine 5 (A) NEG^Negative mg/dL    Specific Gravity Urine 1.016 1.003 - 1.035    Blood Urine Negative NEG^Negative    pH Urine 6.0 5.0 - 7.0 pH    Protein Albumin Urine Negative NEG^Negative mg/dL    Urobilinogen mg/dL 2.0 0.0 - 2.0 mg/dL    Nitrite Urine Negative NEG^Negative    Leukocyte Esterase Urine Negative NEG^Negative    Source Midstream Urine     WBC Urine 0 0 - 5 /HPF    RBC  Urine 1 0 - 2 /HPF    Mucous Urine Present (A) NEG^Negative /LPF   Albumin Random Urine Quantitative with Creat Ratio    Collection Time: 06/08/18 12:20 PM   Result Value Ref Range    Creatinine Urine 33 mg/dL    Albumin Urine mg/L 13 mg/L    Albumin Urine mg/g Cr 39.33 (H) 0 - 17 mg/g Cr   Protein  random urine with Creat Ratio    Collection Time: 06/08/18 12:20 PM   Result Value Ref Range    Protein Random Urine <0.05 g/L    Protein Total Urine g/gr Creatinine Unable to calculate due to low value 0 - 0.2 g/g Cr             Again, thank you for allowing me to participate in the care of your patient.      Sincerely,    Raymond Carlson MD
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
## 2018-06-08 NOTE — PROGRESS NOTES
RE: Willie Dougherty  [facility] #[id number]           I saw your patient, Willie Dougherty, in consultation in our pretransplant clinic. He was here at clinic for evaluation as a possible kidney donor to a friend.  He had seen our donor video.  Our donor coordinator shared our center-specific results with him.  We discussed:    (1) The risks of donation--including mortality (0.03% risk) and morbidity (approximately 1% risk of major morbidity).  We discussed the major reported causes of death after kidney donation (bleeding, infection, pulmonary embolism).  We discussed the potential complications, including:  bleeding requiring reoperation, infection requiring reoperation, pneumonia, bowel obstruction, infection at the site of the incision, hernia at the site of the incision, deep vein thrombosis, deep vein thrombosis with associated pulmonary embolism, and urinary tract infection.  I told him I could not possibly name all of the risks, but that he was at risk for any complications that could occur in any operation (and that a local library would have books/resources that could provide more detail).       (2) We also discussed the long-term risks of living with one kidney.  We talked in detail about the new publications suggesting slightly increased long-term risk of ESRD after donor nephrectomy.  We discussed the fact that most of the ESRD that has developed after donation has occurred in those donating to a relative; and that it is known that individuals who have a relative with ESRD are at increased risk of ESRD.     (3) The hospital stay and the fact that if all goes well, discharge would occur within two to three days after donor nephrectomy.  We also discussed the fact that there would be no diet or fluid restrictions (again if all went well), and that the only new medication that he would be on would be pain medication.  We discussed the fact that most patients are on Tylenol or something similar within five to six days of  surgery.      (4) The recovery time after surgery and the restrictions that are necessary:  a) no driving for a couple of weeks (or until he felt that his reaction would be adequate if he had to slam on the brakes; and b) no lifting over 10 pounds or any exercise that would stretch his abdominal muscles for six weeks (to allow the muscles to heal so that he doesn't develop a hernia).  We also discussed return to work, which could occur in approximately three to four weeks if he had a desk job or would require not returning to work for at least six weeks if the job required any heavy lifting or exercise.  We discussed the potential for not getting his pep and energy back for anywhere from two to six weeks after surgery and how there was a tremendous individual variation in return of full energy level.  I discussed our donor follow-up studies; and that in our surveys, 90% of donors had their energy back by 6 weeks postdonation.    (5) The concern about long distance travel for the first couple of weeks post-donation.  We discussed the risks of deep vein thrombosis associated with plane or car travel.  I recommended that, if flying, he should get up and walk around every 30-40 minutes; if driving he should ask the  to stop the car every 30-45 minutes so Mr. Dougherty could take a short walk.    (6) The fact that the recipient could be on a waiting list for  donor transplant and would ultimately receive a kidney if Mr. Dougherty did not donate.    Mr. Dougherty had numerous questions about the pain associated with the surgery. He describes himself as sensitive to pain and is quite afraid of having pain. We discussed this in some detail. He was also concerned about his long term risk with one kidney. Apparently he brother and the brother's daughter both have type II diabetes; and he has other family members who have developed diabetes. He is concerned that he may develop diabetes in the future and that there may be  issues with having only one kidney. Again, we discussed this in detail. I gave him our data on patients who had developed type II diabetes. I also steered him to the PubMed website where he could look up information on kidney donor outcomes. He had numerous other questions and I did my best to answer.       I attempted to answer any remaining questions.  I also told him that should he have any questions, he should feel free to contact us.  We would be glad to answer any questions either over the phone or at another clinic visit.  His transplant coordinator is Jennifer Collins and may be reached at [phone].  Thank you for the opportunity to see him.    I spent 50 minutes with this patient.  Over 95% that time was spent in counseling and coordination of care.             Yours truly,               Jamie Riggins MD         Professor of Surgery         ([phone])      EDMUND/

## 2018-06-08 NOTE — MR AVS SNAPSHOT
"              After Visit Summary   2018    Willie Dougherty    MRN: [id number]           Patient Information     Date Of Birth          1960        Visit Information        Provider Department      2018 2:00 PM Raymond Carlson MD [facility]        Today's Diagnoses     Transplant donor evaluation    -  1    Microalbuminuria        Benign essential hypertension        Elevated AST (SGOT)           Follow-ups after your visit        Who to contact     If you have questions or need follow up information about today's clinic visit or your schedule please contact [facility] directly at [phone].  Normal or non-critical lab and imaging results will be communicated to you by [employer], letter or phone within 4 business days after the clinic has received the results. If you do not hear from us within 7 days, please contact the clinic through [employer] or phone. If you have a critical or abnormal lab result, we will notify you by phone as soon as possible.  Submit refill requests through [employer] or call your pharmacy and they will forward the refill request to us. Please allow 3 business days for your refill to be completed.          Additional Information About Your Visit        MyChart Information     Qstream lets you send messages to your doctor, view your test results, renew your prescriptions, schedule appointments and more. To sign up, go to [email or web address] . Click on \"Log in\" on the left side of the screen, which will take you to the Welcome page. Then click on \"Sign up Now\" on the right side of the page.     You will be asked to enter the access code listed below, as well as some personal information. Please follow the directions to create your username and password.     Your access code is: [email or web address]  Expires: 2018  6:30 AM     Your access code will  in 90 days. If you need help or a new code, please call your [place] clinic or [phone].        Care EveryWhere " "ID     This is your Care EveryWhere ID. This could be used by other organizations to access your [facility] medical records  [id number]        Your Vitals Were     Pulse Temperature Respirations Height Pulse Oximetry BMI (Body Mass Index)    108 97.7  F (36.5  C) (Oral) 20 1.68 m (5' 6.14\") 99% 27.16 kg/m2       Blood Pressure from Last 3 Encounters:   06/08/18 (!) 147/93    Weight from Last 3 Encounters:   06/08/18 76.7 kg (169 lb)              Today, you had the following     No orders found for display       Primary Care Provider Fax #    Physician No Ref-Primary [phone]       No address on file        Equal Access to Services     EDUARDO MURILLO : Yris Nava, david hoff, moiz ordoñez, flako hall . So [facility] [phone].    ATENCIÓN: Si habla español, tiene a rome disposición servicios gratuitos de asistencia lingüística. Llame al [phone].    We comply with applicable federal civil rights laws and [place] laws. We do not discriminate on the basis of race, color, national origin, age, disability, sex, sexual orientation, or gender identity.            Thank you!     Thank you for choosing [facility]  for your care. Our goal is always to provide you with excellent care. Hearing back from our patients is one way we can continue to improve our services. Please take a few minutes to complete the written survey that you may receive in the mail after your visit with us. Thank you!             Your Updated Medication List - Protect others around you: Learn how to safely use, store and throw away your medicines at www.disposemymeds.org.          This list is accurate as of 6/8/18 11:59 PM.  Always use your most recent med list.                   Brand Name Dispense Instructions for use Diagnosis    LORazepam 1 MG tablet    ATIVAN     Take 0.5 mg by mouth every 4 hours as needed        omeprazole 20 MG tablet      Take 20 mg by mouth daily as " needed

## 2018-06-08 NOTE — PROGRESS NOTES
Outpatient MNT: Kidney Donor Evaluation    Current BMI: 27.2 (HT 66 in,  lbs/77 kg)  BMI is within criteria of <30 for kidney donation    8 Year Risk of Diabetes  </= 3%     Time Spent: 15 minutes  Visit Type: Initial  Referring Physician: Dr Riggins  Pt accompanied by: self    Nutrition Assessment  Vitamins, Supplements, Pertinent Meds: inconsistent fish oil or MVI   Herbal Medicines/Supplements: none    Diet Recall  Breakfast none   Lunch BLT or spaghetti    Dinner [place], pasta, kabobs    Snacks Minimal chips    Beverages 6-7 cups coffee/day, water, a few glasses milk/week, occasional Gatorade or cranberry juice    Alcohol 4-5 glasses wine weekly    Dining out 1x/week      Physical Activity  Walks, has membership at [employer] (goes less often in the summer)  Does lawn care at his cabin     Anthropometrics  Height:   66 in   BMI:    27.2    Weight Status:Overweight BMI 25-29.9   Weight:  169 lbs            IBW (lb): 142  % IBW: 119    Wt Hx: Fluctuates depending on the season     Adj/dosing BW: 169 lbs/77 kg       Labs  Recent Labs   Lab Test  06/08/18   1106   CHOL  184   HDL  96   LDL  72   TRIG  79       FBG = 79  A1C = 5.2    Prediction of Incident Diabetes Mellitus in Middle-aged Adults: The [place] Offspring Study  Mao Motta MD; James B. Meigs, MD, MPH; Aurea Snider, PhD; Christiana Condon MD, MPH; Carlyle Li MD; Madhu Koch Sr,   PhD  Pt's estimated risk for T2DM (per Table 6 above)  Pt received points for the following criteria: BMI>25, high BP   Total points: 4  8-Year risk of T2DM: </= 3%    Estimated Nutrition Needs  Energy  7559-7937    (25-30 kcal/kg for maintenance)     Protein  62-77    (0.8-1 g/kg for maintenance)           Fluid  1 ml/kcal or per MD     Nutrition Diagnosis  Food and nutrition related knowledge deficit r/t pre transplant donor eval pt AEB pt verbalized not hearing pre/post transplant diet guidelines.    Nutrition Intervention  Nutrition education  provided:  Reviewed overall healthy diet guidelines for pre and post kidney donation. Discussed maintenance of a healthy weight, Na+ intake <3000 mg/day, and avoidance of herbal supplements post donation d/t unknown effects on the kidney.    Patient Understanding: Pt verbalized understanding of education provided.  Expected Compliance: Good  Follow-Up Plans: PRN     Nutrition Goals  Pt to verbalize understanding of education provided     Provided pt with contact info.   Angelia Joseph RD, LD  Pgr [phone]

## 2018-06-08 NOTE — LETTER
6/8/2018       RE: Willie Dougherty  [address]  [place] [address]     Dear Colleague,    Thank you for referring your patient, Willie Dougherty, to the [facility] SOLID ORGAN TRANSPLANT at [facility]. Please see a copy of my visit note below.    RE: Willie Dougherty  [facility] #[id number]           I saw your patient, Willie Dougherty, in consultation in our pretransplant clinic. He was here at clinic for evaluation as a possible kidney donor to a friend.  He had seen our donor video.  Our donor coordinator shared our center-specific results with him.  We discussed:    (1) The risks of donation--including mortality (0.03% risk) and morbidity (approximately 1% risk of major morbidity).  We discussed the major reported causes of death after kidney donation (bleeding, infection, pulmonary embolism).  We discussed the potential complications, including:  bleeding requiring reoperation, infection requiring reoperation, pneumonia, bowel obstruction, infection at the site of the incision, hernia at the site of the incision, deep vein thrombosis, deep vein thrombosis with associated pulmonary embolism, and urinary tract infection.  I told him I could not possibly name all of the risks, but that he was at risk for any complications that could occur in any operation (and that a local library would have books/resources that could provide more detail).       (2) We also discussed the long-term risks of living with one kidney.  We talked in detail about the new publications suggesting slightly increased long-term risk of ESRD after donor nephrectomy.  We discussed the fact that most of the ESRD that has developed after donation has occurred in those donating to a relative; and that it is known that individuals who have a relative with ESRD are at increased risk of ESRD.     (3) The hospital stay and the fact that if all goes well, discharge would occur within two to three days after donor nephrectomy.  We  also discussed the fact that there would be no diet or fluid restrictions (again if all went well), and that the only new medication that he would be on would be pain medication.  We discussed the fact that most patients are on Tylenol or something similar within five to six days of surgery.      (4) The recovery time after surgery and the restrictions that are necessary:  a) no driving for a couple of weeks (or until he felt that his reaction would be adequate if he had to slam on the brakes; and b) no lifting over 10 pounds or any exercise that would stretch his abdominal muscles for six weeks (to allow the muscles to heal so that he doesn't develop a hernia).  We also discussed return to work, which could occur in approximately three to four weeks if he had a desk job or would require not returning to work for at least six weeks if the job required any heavy lifting or exercise.  We discussed the potential for not getting his pep and energy back for anywhere from two to six weeks after surgery and how there was a tremendous individual variation in return of full energy level.  I discussed our donor follow-up studies; and that in our surveys, 90% of donors had their energy back by 6 weeks postdonation.    (5) The concern about long distance travel for the first couple of weeks post-donation.  We discussed the risks of deep vein thrombosis associated with plane or car travel.  I recommended that, if flying, he should get up and walk around every 30-40 minutes; if driving he should ask the  to stop the car every 30-45 minutes so Mr. Dougherty could take a short walk.    (6) The fact that the recipient could be on a waiting list for  donor transplant and would ultimately receive a kidney if Mr. Dougherty did not donate.    Mr. Dougherty had numerous questions about the pain associated with the surgery. He describes himself as sensitive to pain and is quite afraid of having pain. We discussed this in some detail. He  was also concerned about his long term risk with one kidney. Apparently he brother and the brother's daughter both have type II diabetes; and he has other family members who have developed diabetes. He is concerned that he may develop diabetes in the future and that there may be issues with having only one kidney. Again, we discussed this in detail. I gave him our data on patients who had developed type II diabetes. I also steered him to the PubMed website where he could look up information on kidney donor outcomes. He had numerous other questions and I did my best to answer.       I attempted to answer any remaining questions.  I also told him that should he have any questions, he should feel free to contact us.  We would be glad to answer any questions either over the phone or at another clinic visit.  His transplant coordinator is Jennifer Collins and may be reached at [phone].  Thank you for the opportunity to see him.    I spent 50 minutes with this patient.  Over 95% that time was spent in counseling and coordination of care.             Yours truly,               Jamie Riggins MD         Professor of Surgery         ([phone])      AJM/    Again, thank you for allowing me to participate in the care of your patient.      Sincerely,    Jamie Riggins MD

## 2018-06-08 NOTE — NURSING NOTE
Erasto Tapia in clinic [facility] kidney donor evaluation.    Came with venkat on 18  Has offered to be donor to friend  Discussed surgery hospitalization and recovery.  Know when and how to obtain evaluation results and the process for scheduling surgery.  Reviewed SRTR datasheet.  Signed consent for donor evaluation.  Donor Signed AMRITA.  Requested routine cancer screening tests:    NEEDS COLONOSCOPY   Questions answered.  Approx. time spent:  30 minutes  Donor has medical insurance:  Yes      Living Kidney Donor Consent per OPTN Policy 14.3 for Transplant Coordinators    Written assurance has been obtained from the patient that the donor:   1) Is willing to donate  2) Is free from inducement and coercion  3) Has been informed that the donor may decline to donate at any time  4) Has been informed that transplant centers must:     A) Offer donors an opportunity to discontinue the donor consent or evaluation process in a way that is protected and confidential    B) Provide an independent donor advocate (SILVIA) to assist the potential donor during this process    The following was presented in a language in which donor is able to engage in meaningful dialogue and was reviewed and discussed with the patient by the transplant coordinator and the physician.     The following has been provided:   Education and instruction about all phases of the living donation process includin) Consent  2) The donor must undergo medical and psychosocial evaluations  3) Disclosure that the recovery hospital will take all reasonable precautions to provide confidentiality for the donor/recipient  4) Disclosure that it is a federal crime for any person to knowingly acquire, obtain or otherwise transfer any human organ for valuable consideration  5) The transplant hospital may refuse the potential donor, and the donor could be evaluated by another transplant program with different selection criteria  6) Data from the most recent SRTR  center-specific reports:     A) National 1-year patient and graft survival rates    B) Hospital s 1-year patient and graft survival rates    C) Notification about all CMS outcome requirements not being met by the recovery and recipient s hospitals    The following has been provided:   1) Education about expected post-donation kidney function and how chronic kidney disease and end-stage renal disease might potentially impact the donor in the future to include:    A) On average, donors will have 25-35% permanent loss of kidney function at donation    B) Baseline risk of End Stage Renal Disease (ESRD)  does not exceed that of members of general population with same demographic profile    C) Donor risks must be interpreted in light of known epidemiology of both Chronic Kidney Disease (CKD) or ESRD    D) CKD generally develops in midlife (40-50 years old)    E) ESRD generally develops after age 60    F) Medical evaluation of young potential donor cannot predict lifetime risk  2) Donors may be at higher risk for CKD if they sustain damage to the remaining kidney. 3) Development of CKD and progression to ESRD may be more rapid with only 1 kidney  4) Dialysis is required when reaching ESRD  5) Current practice prioritizes prior living kidney donors who became kidney transplant candidates  6) Alternate procedures or courses of treatment for the recipient, including  donor transplant    A) A  donor kidney may become available for the recipient before donor evaluation is complete or transplant occurs    B) Any transplant candidate may have risk factors for increased morbidity or mortality that are not disclosed to the potential donor  7) The patient will receive a thorough medical and psychosocial evaluation  8) Health information obtained during the evaluation is subject to the same regulations as all records and could reveal conditions that must be reported to local, state, or federal public health  authorities  9) The [facility] [place], is required to report living donor follow up information at 6, 12, and 24 months  10) Potential donors must commit to post operative follow up testing coordinated by the [facility]  11) Any infectious disease or malignancy pertinent to acute recipient care discovered during the potential donor s first two years of follow up care:    A) Will be disclosed to the donor    B) May need to be reported to local, state or federal public health authorities    C) Will be disclosed to their recipient s transplant center, and    D) Will be reported through the OPTN Improving Patient Safety Portal    Disclosure has been provided that these risks may be transient or permanent & include but are not limited to potential medical or surgical risks:    Death    Scars, pain, fatigue, and other consequences typical of any surgical procedure    Decreased kidney function    Abdominal or bowel symptoms such as bloating and nausea, and developing bowel obstruction    Kidney failure and the need for dialysis or kidney transplant for the donor  Impact of obesity, hypertension or other donor-specific medical conditions on morbidity and mortality of the potential donor    LINDSAY Baires, RN     Transplant Coordinator  Occxtx-[phone]  [phone]

## 2018-06-08 NOTE — MR AVS SNAPSHOT
"              After Visit Summary   2018    Willie Dougherty    MRN: [id number]           Patient Information     Date Of Birth          1960        Visit Information        Provider Department      2018 1:30 PM Jennifer Collins RN [facility] Solid Organ Transplant        Today's Diagnoses     Transplant donor evaluation    -  1       Follow-ups after your visit        Who to contact     If you have questions or need follow up information about today's clinic visit or your schedule please contact [facility] SOLID ORGAN TRANSPLANT directly at [phone].  Normal or non-critical lab and imaging results will be communicated to you by eXelatehart, letter or phone within 4 business days after the clinic has received the results. If you do not hear from us within 7 days, please contact the clinic through [employer] or phone. If you have a critical or abnormal lab result, we will notify you by phone as soon as possible.  Submit refill requests through [employer] or call your pharmacy and they will forward the refill request to us. Please allow 3 business days for your refill to be completed.          Additional Information About Your Visit        MyChart Information     [employer] lets you send messages to your doctor, view your test results, renew your prescriptions, schedule appointments and more. To sign up, go to [email or web address] . Click on \"Log in\" on the left side of the screen, which will take you to the Welcome page. Then click on \"Sign up Now\" on the right side of the page.     You will be asked to enter the access code listed below, as well as some personal information. Please follow the directions to create your username and password.     Your access code is: [email or web address]  Expires: 2018  6:30 AM     Your access code will  in 90 days. If you need help or a new code, please call your [place] clinic or [phone].        Care EveryWhere ID     This is your Care EveryWhere ID. This could be used by " other organizations to access your [place] medical records  [id number]         Blood Pressure from Last 3 Encounters:   06/08/18 (!) 147/93    Weight from Last 3 Encounters:   06/08/18 76.7 kg (169 lb)              Today, you had the following     No orders found for display       Primary Care Provider Fax #    Physician No Ref-Primary [phone]       No address on file        Equal Access to Services     [facility]: Hadii aad ku hadasho Soomaali, waaxda luqadaha, qaybta kaalmada adeegyada, waxay galain hayaan adeeg russell laAdriánjovani . So [facility] [phone].    ATENCIÓN: Si habla español, tiene a rome disposición servicios gratuitos de asistencia lingüística. Llame al [phone].    We comply with applicable federal civil rights laws and [place] laws. We do not discriminate on the basis of race, color, national origin, age, disability, sex, sexual orientation, or gender identity.            Thank you!     Thank you for choosing [facility] SOLID ORGAN TRANSPLANT  for your care. Our goal is always to provide you with excellent care. Hearing back from our patients is one way we can continue to improve our services. Please take a few minutes to complete the written survey that you may receive in the mail after your visit with us. Thank you!             Your Updated Medication List - Protect others around you: Learn how to safely use, store and throw away your medicines at www.disposemymeds.org.          This list is accurate as of 6/8/18  4:12 PM.  Always use your most recent med list.                   Brand Name Dispense Instructions for use Diagnosis    LORazepam 1 MG tablet    ATIVAN     Take 0.5 mg by mouth every 4 hours as needed        omeprazole 20 MG tablet      Take 20 mg by mouth daily as needed

## 2018-06-11 LAB
HBV CORE AB SERPL QL IA: NONREACTIVE
HBV SURFACE AB SERPL IA-ACNC: 0.31 M[IU]/ML
HBV SURFACE AG SERPL QL IA: NONREACTIVE
HCV AB SERPL QL IA: NONREACTIVE
HIV 1+2 AB+HIV1 P24 AG SERPL QL IA: NONREACTIVE
INTERPRETATION ECG - MUSE: NORMAL
M TB TUBERC IFN-G BLD QL: NEGATIVE
M TB TUBERC IFN-G/MITOGEN IGNF BLD: 0.03 IU/ML

## 2018-06-13 ENCOUNTER — TELEPHONE (OUTPATIENT)
Dept: TRANSPLANT | Facility: CLINIC | Age: 58
End: 2018-06-13

## 2018-06-13 ENCOUNTER — COMMITTEE REVIEW (OUTPATIENT)
Dept: TRANSPLANT | Facility: CLINIC | Age: 58
End: 2018-06-13

## 2018-06-13 NOTE — TELEPHONE ENCOUNTER
"D/II:Received return call from Willie today.  He is in the process of re-establishing contact with his former psychiatrist, Dr. Eaton.  She apparently has changed clinics and is going through the intake process at new clinic.  He will talk with her and sign AMRITA in the next week or so.  He did voice some apprehension since he has only known the recipient since April 1st and has only been considering donation for 6 or 7 weeks.  He was clear that he does not want to stop the donor process, but I made it clear with him that we will create a medical \"out\" for him if needed, could ask that he hold off on donation for a few months while he continues to consider donation, etc.  He understands that we will not continue with CT or iohexol until psychiatric history is better understood and reviewed.  A:  Seemed less anxious/overwhelmed during our phone conversation today.  Some apprehension noted but he wants to move forward with evaluation currently.  Knows that our next step is get obtain additional mental health information.  P:  I will follow up with his psychiatrist once AMRITA is signed and returned.  He knows how to contact me for further information or support.    "

## 2018-06-13 NOTE — COMMITTEE REVIEW
Abdominal Committee Review Note     Evaluation Date:   Committee Review Date: 6/13/2018    Organ being evaluated for: Kidney    Transplant Phase:   Transplant Status:     Transplant Coordinator: Jennifer Collins  Transplant Surgeon:       Referring Physician: Referred Self    Primary Diagnosis:   Secondary Diagnosis:     Committee Review Members:  Julian Joseph, RD   Pharmacy Evgeny Ashton, [facility]    - Clinical Bhavani Larsen, [facility], Negrita Bradshaw, [facility]   Transplant Justa Malcolm, RN, Jennifer Collins, RN, Jamie Riggins MD, Faye Wilson, JAIME, Lucila Spivey, LUISITON, Radha Solis, CIRO, Hunter Jackson MD, Chema Dunn MD       Transplant Eligibility:     Committee Review Decision: Needs Re-presentation    Relative Contraindications: Other, see committee note    Absolute Contraindications: None    Committee Chair Chema Dunn MD verbally attested to the committee's decision.    Committee Discussion Details:      Reviewed Nguyen's abnormal evaluation findings: microalbumin. Needs repeat in 6 weeks.  AST. Repeat in 6 weeks.  See PCP for hypertension.24 hour cuff, CT, Iohexol.   Gayle Espinoza for psych exam.      Also of note: RECEIVE AND REVIEW [facility] RECORD BY  BEFORE MOVING FORWARD       CT Reviewed and decision to use not done kidney for donation.       Next Steps:SW to call donor and get psych record.  Team to review.If all ok,  sEe Gayle Espinoza. If all ok continue medical eval.

## 2018-06-13 NOTE — TELEPHONE ENCOUNTER
Voice message left for Mr. Dougherty this morning to let him know that we will need to review his psychiatric records if he remains interested in donation.  I also emailed him a Release of Information form for him to sign and return.  Per our conversation during his evaluation, he didn't want me to contact his psychiatrist until he sees her at his next appointment, which is apparently this week.  Once AMRITA is returned, I will contact MD for review.

## 2018-07-17 ENCOUNTER — TELEPHONE (OUTPATIENT)
Dept: TRANSPLANT | Facility: CLINIC | Age: 58
End: 2018-07-17

## 2018-07-17 NOTE — TELEPHONE ENCOUNTER
Willie called to discuss donor evaluation.  He said he called his psychiatrist to talk to her about donation and obtaining records to provide to us regarding his past psych history.  He said his psychiatrist who he calls his friend suggested that he not be a donor.  She had many reason but told him she did not think it would be in his best interest psychologically to do this.   He respects her opinion and has decided to withdrawal.  He struggled with letting his friend know and feels bad.  I let Wlilie know that I will send him a decline letter and he mirin let his friend know that he did not pass the evaluation.  I let Willie know the many ways he can help his friend. He appreciated our conversation.  I will ask Bhavani QUINTERO to follow up with Willie and offer support for making this decision.

## 2018-08-03 ENCOUNTER — TELEPHONE (OUTPATIENT)
Dept: TRANSPLANT | Facility: CLINIC | Age: 58
End: 2018-08-03

## 2018-08-03 NOTE — TELEPHONE ENCOUNTER
"Phone conversation with Willie today since he is feeling pressured by recipient to go to a different transplant center for a \"second opinion\".  Willie values his friendship with this recipient, yet also knows that being a donor is not in his best interest.  He has told the recipient that his kidney function isn't good enough and that he has been denied, but recipient is having a hard time accepting this.  We talked about the importance of being firm with recipient that he is unable to be the donor, and repeating this message often, while also offering to help recipient look into other ways of seeking out a donor.  Willie does not want to lose his friendship, as they do things together a couple of times per week, and is going to try to  address this fear directly with recipient since he values his friendship.  I encouraged Willie to keep me updated with how giving a clear, repetitive message is going, and that he should call me if he continues to feel pressure.  "

## 2018-08-03 NOTE — TELEPHONE ENCOUNTER
Returned Nguyen call.  Willie is having trouble convincing his recipient that he cannot be his donor.  His recipient wants him to get a second opinioin,.   Willie, donor withdrew on the advice of his psychiatrist.  I gave Willie some talkng points for his recipient.  I offered W to call him.  I will Bhavani to call Willie.

## 2018-08-21 ENCOUNTER — TELEPHONE (OUTPATIENT)
Dept: TRANSPLANT | Facility: CLINIC | Age: 58
End: 2018-08-21

## 2018-08-21 NOTE — TELEPHONE ENCOUNTER
D/I:  Phone call from Willie today wanting to update me with recent interactions with recipient.  Willie has repeatedly told him that it is not safe for him to be a kidney donor and that he has protein in his urine which makes him unable to donate.  Recipient continues to pressure Willie into going to a different transplant center, or demanding from us that we put it in writing as to why he was denied, or consider suing us for denying him as a donor, etc.  Willie wonders if he should just let recipient know that his psychiatrist doesn't want him to donate although really doesn't want to reveal to recip that he has any mental health history.  He is realizing just how judgemental recipient is, and states he is treating him poorly and hs been more rude to him.  Willie does not want me to pass along any of this to the recipient team as he doesn't was recip to know that he is talking with us.  He states that his contact with recipient is less frequent now and he continues to repeat the fact that he can not and will not donate.  A: Willie continues to feel pressure and coercion to donate a kidney, even after he has been denied by our center.  Willie agrees that limiting his contact with recipient is necessary and he is doing this more and more regularly.  He is saddened by the loss of this friendship but also realizes that he has known him for only four months and this is not the type of relationships he wants to foster in his life.  He requests I not discuss this with recipient team which I will not do without his permission.  I offered support, encouraged him to limit info provided to recipient (he should not feel pressured to reveal mental health history) and continue to set healthy limits for himself.  I encouraged him to continue to contact me for support.  P:  Will remain available to offer support to Willie.  I will not discuss with recipient team without Willie's permission.

## 2018-09-18 ENCOUNTER — TELEPHONE (OUTPATIENT)
Dept: TRANSPLANT | Facility: CLINIC | Age: 58
End: 2018-09-18

## 2018-09-19 ENCOUNTER — TELEPHONE (OUTPATIENT)
Dept: TRANSPLANT | Facility: CLINIC | Age: 58
End: 2018-09-19

## 2018-09-19 NOTE — TELEPHONE ENCOUNTER
"D/I:  Phone call from Willie Dougherty today.  He prefaced our conversation by saying that he has compassion for his recipient and knows recipient is desperate and Willie does not want information going out to recipient team.  He states that recipient is \"done working with our transplant center\" and is now going to [facility] in [place].  Bhargavi is there now, and will be meeting with his team and wants to know why Willie is not an approved donor in [place].  Willie has actually never been evaluated in [place].  He did however get the telephone number of recipient's coordinator in [place] and left her a confidential message to call him as he wants to make sure they know that Willie does not intend to be a donor.  Willie also stated that recipient has hinted that he \"might take matters into his own hands\" referring to recipient expressing suicidal ideation to him recently.  Willie did call bhargavi's PCP, he also reached out to bhargavi's friends and also the the police do a wellfare check (they said he was fine).   A:   Willie wanted to talk about this situation since he at times feels responsible for recipient's suicidal thoughts and overall poor health.  We talked about the importance of healthy detachment and his lifelong pattern of wanting to \"rescue\" others.  He is going to get more involved in Al-Anon again which I encouraged him to do.  Although Willie minimizes this, he continues to feel pressure to donate and guilt, although remains clear that donation would not be in his best interest.  He had to end the conversation (he was going into a meeting) but knows how to contact me.  P:  I will remain available for ongoing psychosocial support and can refer for additional mental health services and support as needed.  "

## 2018-09-19 NOTE — TELEPHONE ENCOUNTER
Patient called via triage line to ask if we had any numbers for the donor network in [place]. His recipient is heading there now because he is still listed and will likely inquire about Willie being a donor for him. Willie wanted to call and give them a heads up at the situation going on. I provided a number I found--and provided contact information for any other potential questions he may have. He stated he will call Bhavani Larsen tomorrow to update.

## 2019-06-17 ENCOUNTER — AMBULATORY - HEALTHEAST (OUTPATIENT)
Dept: BEHAVIORAL HEALTH | Facility: CLINIC | Age: 59
End: 2019-06-17

## 2019-06-17 DIAGNOSIS — F19.10 CHEMICAL ABUSE (H): ICD-10-CM

## 2019-06-19 ENCOUNTER — OFFICE VISIT - HEALTHEAST (OUTPATIENT)
Dept: ADDICTION MEDICINE | Facility: CLINIC | Age: 59
End: 2019-06-19

## 2019-06-19 DIAGNOSIS — F10.20 ALCOHOL USE DISORDER, SEVERE, DEPENDENCE (H): ICD-10-CM

## 2019-06-26 ENCOUNTER — OFFICE VISIT - HEALTHEAST (OUTPATIENT)
Dept: BEHAVIORAL HEALTH | Facility: CLINIC | Age: 59
End: 2019-06-26

## 2019-06-26 DIAGNOSIS — F31.81 BIPOLAR II DISORDER (H): ICD-10-CM

## 2019-06-26 DIAGNOSIS — F33.1 MODERATE RECURRENT MAJOR DEPRESSION (H): ICD-10-CM

## 2019-06-26 DIAGNOSIS — F10.20 SEVERE ALCOHOL USE DISORDER (H): ICD-10-CM

## 2019-06-26 LAB
AMPHETAMINES UR QL SCN: NORMAL
BARBITURATES UR QL: NORMAL
BENZODIAZ UR QL: NORMAL
CANNABINOIDS UR QL SCN: NORMAL
COCAINE UR QL: NORMAL
CREAT UR-MCNC: 490.7 MG/DL
METHADONE UR QL SCN: NORMAL
OPIATES UR QL SCN: NORMAL
OXYCODONE UR QL: NORMAL
PCP UR QL SCN: NORMAL

## 2019-06-26 ASSESSMENT — MIFFLIN-ST. JEOR: SCORE: 1510.26

## 2019-06-29 LAB
ETHYL GLUCURONIDE UR CFM-MCNC: <100 NG/ML
ETHYL SULFATE UR CFM-MCNC: <100 NG/ML

## 2019-07-02 ENCOUNTER — OFFICE VISIT - HEALTHEAST (OUTPATIENT)
Dept: ADDICTION MEDICINE | Facility: CLINIC | Age: 59
End: 2019-07-02

## 2019-07-02 ENCOUNTER — AMBULATORY - HEALTHEAST (OUTPATIENT)
Dept: ADDICTION MEDICINE | Facility: CLINIC | Age: 59
End: 2019-07-02

## 2019-07-02 DIAGNOSIS — F10.20 ALCOHOL USE DISORDER, SEVERE, DEPENDENCE (H): ICD-10-CM

## 2019-07-05 ENCOUNTER — OFFICE VISIT - HEALTHEAST (OUTPATIENT)
Dept: ADDICTION MEDICINE | Facility: CLINIC | Age: 59
End: 2019-07-05

## 2019-07-05 DIAGNOSIS — F10.20 ALCOHOL USE DISORDER, SEVERE, DEPENDENCE (H): ICD-10-CM

## 2019-07-08 ENCOUNTER — OFFICE VISIT - HEALTHEAST (OUTPATIENT)
Dept: ADDICTION MEDICINE | Facility: CLINIC | Age: 59
End: 2019-07-08

## 2019-07-08 DIAGNOSIS — F10.20 ALCOHOL USE DISORDER, SEVERE, DEPENDENCE (H): ICD-10-CM

## 2019-07-10 ENCOUNTER — OFFICE VISIT - HEALTHEAST (OUTPATIENT)
Dept: ADDICTION MEDICINE | Facility: CLINIC | Age: 59
End: 2019-07-10

## 2019-07-10 DIAGNOSIS — F10.20 ALCOHOL USE DISORDER, SEVERE, DEPENDENCE (H): ICD-10-CM

## 2019-07-11 ENCOUNTER — AMBULATORY - HEALTHEAST (OUTPATIENT)
Dept: ADDICTION MEDICINE | Facility: CLINIC | Age: 59
End: 2019-07-11

## 2019-07-12 ENCOUNTER — OFFICE VISIT - HEALTHEAST (OUTPATIENT)
Dept: ADDICTION MEDICINE | Facility: CLINIC | Age: 59
End: 2019-07-12

## 2019-07-12 DIAGNOSIS — F10.20 ALCOHOL USE DISORDER, SEVERE, DEPENDENCE (H): ICD-10-CM

## 2019-07-15 ENCOUNTER — OFFICE VISIT - HEALTHEAST (OUTPATIENT)
Dept: ADDICTION MEDICINE | Facility: CLINIC | Age: 59
End: 2019-07-15

## 2019-07-15 DIAGNOSIS — F10.20 ALCOHOL USE DISORDER, SEVERE, DEPENDENCE (H): ICD-10-CM

## 2019-07-17 ENCOUNTER — OFFICE VISIT - HEALTHEAST (OUTPATIENT)
Dept: ADDICTION MEDICINE | Facility: CLINIC | Age: 59
End: 2019-07-17

## 2019-07-17 DIAGNOSIS — F10.20 ALCOHOL USE DISORDER, SEVERE, DEPENDENCE (H): ICD-10-CM

## 2019-07-19 ENCOUNTER — OFFICE VISIT - HEALTHEAST (OUTPATIENT)
Dept: ADDICTION MEDICINE | Facility: CLINIC | Age: 59
End: 2019-07-19

## 2019-07-19 ENCOUNTER — AMBULATORY - HEALTHEAST (OUTPATIENT)
Dept: ADDICTION MEDICINE | Facility: CLINIC | Age: 59
End: 2019-07-19

## 2019-07-19 DIAGNOSIS — F10.20 ALCOHOL USE DISORDER, SEVERE, DEPENDENCE (H): ICD-10-CM

## 2019-07-21 ENCOUNTER — COMMUNICATION - HEALTHEAST (OUTPATIENT)
Dept: BEHAVIORAL HEALTH | Facility: CLINIC | Age: 59
End: 2019-07-21

## 2019-07-21 DIAGNOSIS — F10.20 SEVERE ALCOHOL USE DISORDER (H): ICD-10-CM

## 2019-07-22 ENCOUNTER — OFFICE VISIT - HEALTHEAST (OUTPATIENT)
Dept: ADDICTION MEDICINE | Facility: CLINIC | Age: 59
End: 2019-07-22

## 2019-07-22 DIAGNOSIS — F10.20 ALCOHOL USE DISORDER, SEVERE, DEPENDENCE (H): ICD-10-CM

## 2019-07-24 ENCOUNTER — OFFICE VISIT - HEALTHEAST (OUTPATIENT)
Dept: BEHAVIORAL HEALTH | Facility: CLINIC | Age: 59
End: 2019-07-24

## 2019-07-24 ENCOUNTER — OFFICE VISIT - HEALTHEAST (OUTPATIENT)
Dept: ADDICTION MEDICINE | Facility: CLINIC | Age: 59
End: 2019-07-24

## 2019-07-24 DIAGNOSIS — F10.982 ALCOHOL-INDUCED INSOMNIA (H): ICD-10-CM

## 2019-07-24 DIAGNOSIS — F10.20 ALCOHOL USE DISORDER, SEVERE, DEPENDENCE (H): ICD-10-CM

## 2019-07-24 DIAGNOSIS — F10.20 SEVERE ALCOHOL USE DISORDER (H): ICD-10-CM

## 2019-07-24 DIAGNOSIS — K21.9 GASTROESOPHAGEAL REFLUX DISEASE, ESOPHAGITIS PRESENCE NOT SPECIFIED: ICD-10-CM

## 2019-07-24 DIAGNOSIS — F19.94 MOOD DISORDER, DRUG-INDUCED (H): ICD-10-CM

## 2019-07-24 LAB
AMPHETAMINES UR QL SCN: NORMAL
BARBITURATES UR QL: NORMAL
BENZODIAZ UR QL: NORMAL
CANNABINOIDS UR QL SCN: NORMAL
COCAINE UR QL: NORMAL
CREAT UR-MCNC: 218.5 MG/DL
METHADONE UR QL SCN: NORMAL
OPIATES UR QL SCN: NORMAL
OXYCODONE UR QL: NORMAL
PCP UR QL SCN: NORMAL

## 2019-07-24 ASSESSMENT — MIFFLIN-ST. JEOR: SCORE: 1510.26

## 2019-07-26 ENCOUNTER — OFFICE VISIT - HEALTHEAST (OUTPATIENT)
Dept: ADDICTION MEDICINE | Facility: CLINIC | Age: 59
End: 2019-07-26

## 2019-07-26 DIAGNOSIS — F10.20 ALCOHOL USE DISORDER, SEVERE, DEPENDENCE (H): ICD-10-CM

## 2019-07-27 LAB
ETHYL GLUCURONIDE UR CFM-MCNC: <100 NG/ML
ETHYL SULFATE UR CFM-MCNC: <100 NG/ML

## 2019-07-28 ENCOUNTER — AMBULATORY - HEALTHEAST (OUTPATIENT)
Dept: ADDICTION MEDICINE | Facility: CLINIC | Age: 59
End: 2019-07-28

## 2019-07-29 ENCOUNTER — OFFICE VISIT - HEALTHEAST (OUTPATIENT)
Dept: ADDICTION MEDICINE | Facility: CLINIC | Age: 59
End: 2019-07-29

## 2019-07-29 DIAGNOSIS — F10.20 ALCOHOL USE DISORDER, SEVERE, DEPENDENCE (H): ICD-10-CM

## 2019-07-31 ENCOUNTER — OFFICE VISIT - HEALTHEAST (OUTPATIENT)
Dept: ADDICTION MEDICINE | Facility: CLINIC | Age: 59
End: 2019-07-31

## 2019-07-31 DIAGNOSIS — F10.20 ALCOHOL USE DISORDER, SEVERE, DEPENDENCE (H): ICD-10-CM

## 2019-08-01 ENCOUNTER — AMBULATORY - HEALTHEAST (OUTPATIENT)
Dept: ADDICTION MEDICINE | Facility: CLINIC | Age: 59
End: 2019-08-01

## 2019-08-02 ENCOUNTER — OFFICE VISIT - HEALTHEAST (OUTPATIENT)
Dept: ADDICTION MEDICINE | Facility: CLINIC | Age: 59
End: 2019-08-02

## 2019-08-02 DIAGNOSIS — F10.20 ALCOHOL USE DISORDER, SEVERE, DEPENDENCE (H): ICD-10-CM

## 2019-08-07 ENCOUNTER — OFFICE VISIT - HEALTHEAST (OUTPATIENT)
Dept: ADDICTION MEDICINE | Facility: CLINIC | Age: 59
End: 2019-08-07

## 2019-08-07 DIAGNOSIS — F10.20 ALCOHOL USE DISORDER, SEVERE, DEPENDENCE (H): ICD-10-CM

## 2019-08-08 ENCOUNTER — AMBULATORY - HEALTHEAST (OUTPATIENT)
Dept: ADDICTION MEDICINE | Facility: CLINIC | Age: 59
End: 2019-08-08

## 2019-08-09 ENCOUNTER — OFFICE VISIT - HEALTHEAST (OUTPATIENT)
Dept: ADDICTION MEDICINE | Facility: CLINIC | Age: 59
End: 2019-08-09

## 2019-08-09 DIAGNOSIS — F10.20 ALCOHOL USE DISORDER, SEVERE, DEPENDENCE (H): ICD-10-CM

## 2019-08-12 ENCOUNTER — OFFICE VISIT - HEALTHEAST (OUTPATIENT)
Dept: ADDICTION MEDICINE | Facility: CLINIC | Age: 59
End: 2019-08-12

## 2019-08-12 DIAGNOSIS — F10.20 ALCOHOL USE DISORDER, SEVERE, DEPENDENCE (H): ICD-10-CM

## 2019-08-14 ENCOUNTER — OFFICE VISIT - HEALTHEAST (OUTPATIENT)
Dept: ADDICTION MEDICINE | Facility: CLINIC | Age: 59
End: 2019-08-14

## 2019-08-14 DIAGNOSIS — F10.20 ALCOHOL USE DISORDER, SEVERE, DEPENDENCE (H): ICD-10-CM

## 2019-08-15 ENCOUNTER — AMBULATORY - HEALTHEAST (OUTPATIENT)
Dept: ADDICTION MEDICINE | Facility: CLINIC | Age: 59
End: 2019-08-15

## 2019-08-16 ENCOUNTER — OFFICE VISIT - HEALTHEAST (OUTPATIENT)
Dept: ADDICTION MEDICINE | Facility: CLINIC | Age: 59
End: 2019-08-16

## 2019-08-16 DIAGNOSIS — F10.20 ALCOHOL USE DISORDER, SEVERE, DEPENDENCE (H): ICD-10-CM

## 2019-08-19 ENCOUNTER — OFFICE VISIT - HEALTHEAST (OUTPATIENT)
Dept: ADDICTION MEDICINE | Facility: CLINIC | Age: 59
End: 2019-08-19

## 2019-08-19 DIAGNOSIS — F10.20 ALCOHOL USE DISORDER, SEVERE, DEPENDENCE (H): ICD-10-CM

## 2019-08-20 ENCOUNTER — COMMUNICATION - HEALTHEAST (OUTPATIENT)
Dept: BEHAVIORAL HEALTH | Facility: CLINIC | Age: 59
End: 2019-08-20

## 2019-08-20 DIAGNOSIS — K21.9 GASTROESOPHAGEAL REFLUX DISEASE, ESOPHAGITIS PRESENCE NOT SPECIFIED: ICD-10-CM

## 2019-08-20 DIAGNOSIS — F10.20 SEVERE ALCOHOL USE DISORDER (H): ICD-10-CM

## 2019-08-21 ENCOUNTER — OFFICE VISIT - HEALTHEAST (OUTPATIENT)
Dept: BEHAVIORAL HEALTH | Facility: CLINIC | Age: 59
End: 2019-08-21

## 2019-08-21 ENCOUNTER — COMMUNICATION - HEALTHEAST (OUTPATIENT)
Dept: BEHAVIORAL HEALTH | Facility: CLINIC | Age: 59
End: 2019-08-21

## 2019-08-21 DIAGNOSIS — F43.22 ADJUSTMENT DISORDER WITH ANXIETY: ICD-10-CM

## 2019-08-21 DIAGNOSIS — F10.20 SEVERE ALCOHOL USE DISORDER (H): ICD-10-CM

## 2019-08-21 DIAGNOSIS — K21.9 GASTROESOPHAGEAL REFLUX DISEASE, ESOPHAGITIS PRESENCE NOT SPECIFIED: ICD-10-CM

## 2019-08-21 DIAGNOSIS — F10.20 ALCOHOL USE DISORDER, SEVERE, DEPENDENCE (H): ICD-10-CM

## 2019-08-21 DIAGNOSIS — F10.982 ALCOHOL-INDUCED INSOMNIA (H): ICD-10-CM

## 2019-08-21 DIAGNOSIS — F19.94 MOOD DISORDER, DRUG-INDUCED (H): ICD-10-CM

## 2019-08-21 LAB
AMPHETAMINES UR QL SCN: NORMAL
BARBITURATES UR QL: NORMAL
BENZODIAZ UR QL: NORMAL
CANNABINOIDS UR QL SCN: NORMAL
COCAINE UR QL: NORMAL
CREAT UR-MCNC: 136.1 MG/DL
METHADONE UR QL SCN: NORMAL
OPIATES UR QL SCN: NORMAL
OXYCODONE UR QL: NORMAL
PCP UR QL SCN: NORMAL

## 2019-08-21 ASSESSMENT — MIFFLIN-ST. JEOR: SCORE: 1501.19

## 2019-08-22 ENCOUNTER — AMBULATORY - HEALTHEAST (OUTPATIENT)
Dept: ADDICTION MEDICINE | Facility: CLINIC | Age: 59
End: 2019-08-22

## 2019-08-23 ENCOUNTER — OFFICE VISIT - HEALTHEAST (OUTPATIENT)
Dept: ADDICTION MEDICINE | Facility: CLINIC | Age: 59
End: 2019-08-23

## 2019-08-23 DIAGNOSIS — F10.20 ALCOHOL USE DISORDER, SEVERE, DEPENDENCE (H): ICD-10-CM

## 2019-08-25 LAB
ETHYL GLUCURONIDE UR CFM-MCNC: <100 NG/ML
ETHYL SULFATE UR CFM-MCNC: <100 NG/ML

## 2019-08-26 ENCOUNTER — OFFICE VISIT - HEALTHEAST (OUTPATIENT)
Dept: ADDICTION MEDICINE | Facility: CLINIC | Age: 59
End: 2019-08-26

## 2019-08-26 DIAGNOSIS — F10.20 ALCOHOL USE DISORDER, SEVERE, DEPENDENCE (H): ICD-10-CM

## 2019-08-27 ENCOUNTER — AMBULATORY - HEALTHEAST (OUTPATIENT)
Dept: ADDICTION MEDICINE | Facility: CLINIC | Age: 59
End: 2019-08-27

## 2019-08-28 ENCOUNTER — COMMUNICATION - HEALTHEAST (OUTPATIENT)
Dept: ADDICTION MEDICINE | Facility: CLINIC | Age: 59
End: 2019-08-28

## 2019-08-29 ENCOUNTER — AMBULATORY - HEALTHEAST (OUTPATIENT)
Dept: ADDICTION MEDICINE | Facility: CLINIC | Age: 59
End: 2019-08-29

## 2019-08-30 ENCOUNTER — OFFICE VISIT - HEALTHEAST (OUTPATIENT)
Dept: ADDICTION MEDICINE | Facility: CLINIC | Age: 59
End: 2019-08-30

## 2019-08-30 ENCOUNTER — OFFICE VISIT - HEALTHEAST (OUTPATIENT)
Dept: BEHAVIORAL HEALTH | Facility: CLINIC | Age: 59
End: 2019-08-30

## 2019-08-30 DIAGNOSIS — F31.30 BIPOLAR I DISORDER, MOST RECENT EPISODE DEPRESSED (H): ICD-10-CM

## 2019-08-30 DIAGNOSIS — F10.21 SEVERE ALCOHOL USE DISORDER, IN EARLY REMISSION (H): ICD-10-CM

## 2019-08-30 DIAGNOSIS — F10.20 ALCOHOL USE DISORDER, SEVERE, DEPENDENCE (H): ICD-10-CM

## 2019-09-01 ENCOUNTER — AMBULATORY - HEALTHEAST (OUTPATIENT)
Dept: BEHAVIORAL HEALTH | Facility: CLINIC | Age: 59
End: 2019-09-01

## 2019-09-05 ENCOUNTER — AMBULATORY - HEALTHEAST (OUTPATIENT)
Dept: ADDICTION MEDICINE | Facility: CLINIC | Age: 59
End: 2019-09-05

## 2019-09-06 ENCOUNTER — OFFICE VISIT - HEALTHEAST (OUTPATIENT)
Dept: ADDICTION MEDICINE | Facility: CLINIC | Age: 59
End: 2019-09-06

## 2019-09-06 DIAGNOSIS — F10.20 ALCOHOL USE DISORDER, SEVERE, DEPENDENCE (H): ICD-10-CM

## 2019-09-09 ENCOUNTER — OFFICE VISIT - HEALTHEAST (OUTPATIENT)
Dept: ADDICTION MEDICINE | Facility: CLINIC | Age: 59
End: 2019-09-09

## 2019-09-09 DIAGNOSIS — F10.20 ALCOHOL USE DISORDER, SEVERE, DEPENDENCE (H): ICD-10-CM

## 2019-09-12 ENCOUNTER — AMBULATORY - HEALTHEAST (OUTPATIENT)
Dept: ADDICTION MEDICINE | Facility: CLINIC | Age: 59
End: 2019-09-12

## 2019-09-13 ENCOUNTER — OFFICE VISIT - HEALTHEAST (OUTPATIENT)
Dept: ADDICTION MEDICINE | Facility: CLINIC | Age: 59
End: 2019-09-13

## 2019-09-13 DIAGNOSIS — F10.20 ALCOHOL USE DISORDER, SEVERE, DEPENDENCE (H): ICD-10-CM

## 2019-09-16 ENCOUNTER — OFFICE VISIT - HEALTHEAST (OUTPATIENT)
Dept: ADDICTION MEDICINE | Facility: CLINIC | Age: 59
End: 2019-09-16

## 2019-09-16 DIAGNOSIS — F10.20 ALCOHOL USE DISORDER, SEVERE, DEPENDENCE (H): ICD-10-CM

## 2019-09-17 ENCOUNTER — COMMUNICATION - HEALTHEAST (OUTPATIENT)
Dept: ADDICTION MEDICINE | Facility: CLINIC | Age: 59
End: 2019-09-17

## 2019-09-19 ENCOUNTER — AMBULATORY - HEALTHEAST (OUTPATIENT)
Dept: ADDICTION MEDICINE | Facility: CLINIC | Age: 59
End: 2019-09-19

## 2019-09-20 ENCOUNTER — OFFICE VISIT - HEALTHEAST (OUTPATIENT)
Dept: ADDICTION MEDICINE | Facility: CLINIC | Age: 59
End: 2019-09-20

## 2019-09-20 DIAGNOSIS — F10.20 ALCOHOL USE DISORDER, SEVERE, DEPENDENCE (H): ICD-10-CM

## 2019-09-23 ENCOUNTER — OFFICE VISIT - HEALTHEAST (OUTPATIENT)
Dept: ADDICTION MEDICINE | Facility: CLINIC | Age: 59
End: 2019-09-23

## 2019-09-23 ENCOUNTER — COMMUNICATION - HEALTHEAST (OUTPATIENT)
Dept: BEHAVIORAL HEALTH | Facility: CLINIC | Age: 59
End: 2019-09-23

## 2019-09-23 ENCOUNTER — OFFICE VISIT - HEALTHEAST (OUTPATIENT)
Dept: BEHAVIORAL HEALTH | Facility: CLINIC | Age: 59
End: 2019-09-23

## 2019-09-23 DIAGNOSIS — F31.30 BIPOLAR I DISORDER, MOST RECENT EPISODE DEPRESSED (H): ICD-10-CM

## 2019-09-23 DIAGNOSIS — F10.982 ALCOHOL-INDUCED INSOMNIA (H): ICD-10-CM

## 2019-09-23 DIAGNOSIS — F10.20 ALCOHOL USE DISORDER, SEVERE, DEPENDENCE (H): ICD-10-CM

## 2019-09-23 LAB
AMPHETAMINES UR QL SCN: NORMAL
BARBITURATES UR QL: NORMAL
BENZODIAZ UR QL: NORMAL
CANNABINOIDS UR QL SCN: NORMAL
COCAINE UR QL: NORMAL
CREAT UR-MCNC: 199.2 MG/DL
METHADONE UR QL SCN: NORMAL
OPIATES UR QL SCN: NORMAL
OXYCODONE UR QL: NORMAL
PCP UR QL SCN: NORMAL

## 2019-09-23 ASSESSMENT — ANXIETY QUESTIONNAIRES
4. TROUBLE RELAXING: NOT AT ALL
2. NOT BEING ABLE TO STOP OR CONTROL WORRYING: NOT AT ALL
5. BEING SO RESTLESS THAT IT IS HARD TO SIT STILL: NOT AT ALL
7. FEELING AFRAID AS IF SOMETHING AWFUL MIGHT HAPPEN: NOT AT ALL
1. FEELING NERVOUS, ANXIOUS, OR ON EDGE: SEVERAL DAYS
GAD7 TOTAL SCORE: 2
6. BECOMING EASILY ANNOYED OR IRRITABLE: NOT AT ALL
IF YOU CHECKED OFF ANY PROBLEMS ON THIS QUESTIONNAIRE, HOW DIFFICULT HAVE THESE PROBLEMS MADE IT FOR YOU TO DO YOUR WORK, TAKE CARE OF THINGS AT HOME, OR GET ALONG WITH OTHER PEOPLE: SOMEWHAT DIFFICULT
3. WORRYING TOO MUCH ABOUT DIFFERENT THINGS: SEVERAL DAYS

## 2019-09-23 ASSESSMENT — PATIENT HEALTH QUESTIONNAIRE - PHQ9: SUM OF ALL RESPONSES TO PHQ QUESTIONS 1-9: 3

## 2019-09-23 ASSESSMENT — MIFFLIN-ST. JEOR: SCORE: 1501.19

## 2019-09-26 ENCOUNTER — AMBULATORY - HEALTHEAST (OUTPATIENT)
Dept: ADDICTION MEDICINE | Facility: CLINIC | Age: 59
End: 2019-09-26

## 2019-09-26 LAB
ETHYL GLUCURONIDE UR CFM-MCNC: <100 NG/ML
ETHYL SULFATE UR CFM-MCNC: <100 NG/ML

## 2019-09-27 ENCOUNTER — OFFICE VISIT - HEALTHEAST (OUTPATIENT)
Dept: ADDICTION MEDICINE | Facility: CLINIC | Age: 59
End: 2019-09-27

## 2019-09-27 DIAGNOSIS — F10.20 ALCOHOL USE DISORDER, SEVERE, DEPENDENCE (H): ICD-10-CM

## 2019-10-01 ENCOUNTER — AMBULATORY - HEALTHEAST (OUTPATIENT)
Dept: ADDICTION MEDICINE | Facility: CLINIC | Age: 59
End: 2019-10-01

## 2019-10-01 ENCOUNTER — OFFICE VISIT - HEALTHEAST (OUTPATIENT)
Dept: ADDICTION MEDICINE | Facility: CLINIC | Age: 59
End: 2019-10-01

## 2019-10-01 DIAGNOSIS — F10.20 ALCOHOL USE DISORDER, SEVERE, DEPENDENCE (H): ICD-10-CM

## 2019-10-08 ENCOUNTER — AMBULATORY - HEALTHEAST (OUTPATIENT)
Dept: ADDICTION MEDICINE | Facility: CLINIC | Age: 59
End: 2019-10-08

## 2019-10-08 ENCOUNTER — OFFICE VISIT - HEALTHEAST (OUTPATIENT)
Dept: ADDICTION MEDICINE | Facility: CLINIC | Age: 59
End: 2019-10-08

## 2019-10-08 DIAGNOSIS — F10.20 ALCOHOL USE DISORDER, SEVERE, DEPENDENCE (H): ICD-10-CM

## 2019-10-15 ENCOUNTER — AMBULATORY - HEALTHEAST (OUTPATIENT)
Dept: ADDICTION MEDICINE | Facility: CLINIC | Age: 59
End: 2019-10-15

## 2019-10-15 ENCOUNTER — OFFICE VISIT - HEALTHEAST (OUTPATIENT)
Dept: ADDICTION MEDICINE | Facility: CLINIC | Age: 59
End: 2019-10-15

## 2019-10-15 DIAGNOSIS — F10.20 ALCOHOL USE DISORDER, SEVERE, DEPENDENCE (H): ICD-10-CM

## 2019-10-22 ENCOUNTER — OFFICE VISIT - HEALTHEAST (OUTPATIENT)
Dept: ADDICTION MEDICINE | Facility: CLINIC | Age: 59
End: 2019-10-22

## 2019-10-22 ENCOUNTER — AMBULATORY - HEALTHEAST (OUTPATIENT)
Dept: ADDICTION MEDICINE | Facility: CLINIC | Age: 59
End: 2019-10-22

## 2019-10-22 DIAGNOSIS — F10.20 ALCOHOL USE DISORDER, SEVERE, DEPENDENCE (H): ICD-10-CM

## 2019-11-05 ENCOUNTER — OFFICE VISIT - HEALTHEAST (OUTPATIENT)
Dept: ADDICTION MEDICINE | Facility: CLINIC | Age: 59
End: 2019-11-05

## 2019-11-05 ENCOUNTER — AMBULATORY - HEALTHEAST (OUTPATIENT)
Dept: ADDICTION MEDICINE | Facility: CLINIC | Age: 59
End: 2019-11-05

## 2019-11-05 DIAGNOSIS — F10.20 ALCOHOL USE DISORDER, SEVERE, DEPENDENCE (H): ICD-10-CM

## 2019-11-08 ENCOUNTER — COMMUNICATION - HEALTHEAST (OUTPATIENT)
Dept: BEHAVIORAL HEALTH | Facility: CLINIC | Age: 59
End: 2019-11-08

## 2019-11-12 ENCOUNTER — AMBULATORY - HEALTHEAST (OUTPATIENT)
Dept: ADDICTION MEDICINE | Facility: CLINIC | Age: 59
End: 2019-11-12

## 2019-11-12 ENCOUNTER — OFFICE VISIT - HEALTHEAST (OUTPATIENT)
Dept: ADDICTION MEDICINE | Facility: CLINIC | Age: 59
End: 2019-11-12

## 2019-11-12 DIAGNOSIS — F10.20 ALCOHOL USE DISORDER, SEVERE, DEPENDENCE (H): ICD-10-CM

## 2019-11-15 ENCOUNTER — COMMUNICATION - HEALTHEAST (OUTPATIENT)
Dept: ADDICTION MEDICINE | Facility: CLINIC | Age: 59
End: 2019-11-15

## 2019-11-19 ENCOUNTER — OFFICE VISIT - HEALTHEAST (OUTPATIENT)
Dept: ADDICTION MEDICINE | Facility: CLINIC | Age: 59
End: 2019-11-19

## 2019-11-19 ENCOUNTER — OFFICE VISIT - HEALTHEAST (OUTPATIENT)
Dept: BEHAVIORAL HEALTH | Facility: CLINIC | Age: 59
End: 2019-11-19

## 2019-11-19 ENCOUNTER — AMBULATORY - HEALTHEAST (OUTPATIENT)
Dept: ADDICTION MEDICINE | Facility: CLINIC | Age: 59
End: 2019-11-19

## 2019-11-19 DIAGNOSIS — F10.20 ALCOHOL USE DISORDER, SEVERE, DEPENDENCE (H): ICD-10-CM

## 2019-11-19 DIAGNOSIS — F31.30 BIPOLAR I DISORDER, MOST RECENT EPISODE DEPRESSED (H): ICD-10-CM

## 2019-11-19 LAB
AMPHETAMINES UR QL SCN: NORMAL
BARBITURATES UR QL: NORMAL
BENZODIAZ UR QL: NORMAL
CANNABINOIDS UR QL SCN: NORMAL
COCAINE UR QL: NORMAL
CREAT UR-MCNC: 12 MG/DL
METHADONE UR QL SCN: NORMAL
OPIATES UR QL SCN: NORMAL
OXYCODONE UR QL: NORMAL
PCP UR QL SCN: NORMAL

## 2019-11-19 ASSESSMENT — ANXIETY QUESTIONNAIRES
6. BECOMING EASILY ANNOYED OR IRRITABLE: MORE THAN HALF THE DAYS
7. FEELING AFRAID AS IF SOMETHING AWFUL MIGHT HAPPEN: NOT AT ALL
3. WORRYING TOO MUCH ABOUT DIFFERENT THINGS: SEVERAL DAYS
2. NOT BEING ABLE TO STOP OR CONTROL WORRYING: SEVERAL DAYS
IF YOU CHECKED OFF ANY PROBLEMS ON THIS QUESTIONNAIRE, HOW DIFFICULT HAVE THESE PROBLEMS MADE IT FOR YOU TO DO YOUR WORK, TAKE CARE OF THINGS AT HOME, OR GET ALONG WITH OTHER PEOPLE: SOMEWHAT DIFFICULT
5. BEING SO RESTLESS THAT IT IS HARD TO SIT STILL: MORE THAN HALF THE DAYS
GAD7 TOTAL SCORE: 10
4. TROUBLE RELAXING: MORE THAN HALF THE DAYS
1. FEELING NERVOUS, ANXIOUS, OR ON EDGE: MORE THAN HALF THE DAYS

## 2019-11-19 ASSESSMENT — MIFFLIN-ST. JEOR: SCORE: 1514.79

## 2019-11-19 ASSESSMENT — PATIENT HEALTH QUESTIONNAIRE - PHQ9: SUM OF ALL RESPONSES TO PHQ QUESTIONS 1-9: 4

## 2019-11-22 ENCOUNTER — OFFICE VISIT - HEALTHEAST (OUTPATIENT)
Dept: BEHAVIORAL HEALTH | Facility: CLINIC | Age: 59
End: 2019-11-22

## 2019-11-22 DIAGNOSIS — F10.21 SEVERE ALCOHOL USE DISORDER, IN EARLY REMISSION (H): ICD-10-CM

## 2019-11-22 DIAGNOSIS — F31.30 BIPOLAR I DISORDER, MOST RECENT EPISODE DEPRESSED (H): ICD-10-CM

## 2019-11-22 LAB
ETHYL GLUCURONIDE UR CFM-MCNC: <100 NG/ML
ETHYL SULFATE UR CFM-MCNC: <100 NG/ML

## 2019-11-26 ENCOUNTER — OFFICE VISIT - HEALTHEAST (OUTPATIENT)
Dept: ADDICTION MEDICINE | Facility: CLINIC | Age: 59
End: 2019-11-26

## 2019-11-26 ENCOUNTER — AMBULATORY - HEALTHEAST (OUTPATIENT)
Dept: ADDICTION MEDICINE | Facility: CLINIC | Age: 59
End: 2019-11-26

## 2019-11-26 DIAGNOSIS — F10.20 ALCOHOL USE DISORDER, SEVERE, DEPENDENCE (H): ICD-10-CM

## 2019-12-01 ENCOUNTER — COMMUNICATION - HEALTHEAST (OUTPATIENT)
Dept: SCHEDULING | Facility: CLINIC | Age: 59
End: 2019-12-01

## 2019-12-05 ENCOUNTER — AMBULATORY - HEALTHEAST (OUTPATIENT)
Dept: ADDICTION MEDICINE | Facility: CLINIC | Age: 59
End: 2019-12-05

## 2019-12-06 ENCOUNTER — OFFICE VISIT - HEALTHEAST (OUTPATIENT)
Dept: BEHAVIORAL HEALTH | Facility: CLINIC | Age: 59
End: 2019-12-06

## 2019-12-06 DIAGNOSIS — F31.30 BIPOLAR I DISORDER, MOST RECENT EPISODE DEPRESSED (H): ICD-10-CM

## 2019-12-06 DIAGNOSIS — F10.21 SEVERE ALCOHOL USE DISORDER, IN EARLY REMISSION (H): ICD-10-CM

## 2019-12-10 ENCOUNTER — OFFICE VISIT - HEALTHEAST (OUTPATIENT)
Dept: ADDICTION MEDICINE | Facility: CLINIC | Age: 59
End: 2019-12-10

## 2019-12-10 DIAGNOSIS — F10.20 ALCOHOL USE DISORDER, SEVERE, DEPENDENCE (H): ICD-10-CM

## 2019-12-12 ENCOUNTER — AMBULATORY - HEALTHEAST (OUTPATIENT)
Dept: ADDICTION MEDICINE | Facility: CLINIC | Age: 59
End: 2019-12-12

## 2019-12-16 ENCOUNTER — COMMUNICATION - HEALTHEAST (OUTPATIENT)
Dept: BEHAVIORAL HEALTH | Facility: CLINIC | Age: 59
End: 2019-12-16

## 2019-12-17 ENCOUNTER — OFFICE VISIT - HEALTHEAST (OUTPATIENT)
Dept: ADDICTION MEDICINE | Facility: CLINIC | Age: 59
End: 2019-12-17

## 2019-12-17 ENCOUNTER — AMBULATORY - HEALTHEAST (OUTPATIENT)
Dept: ADDICTION MEDICINE | Facility: CLINIC | Age: 59
End: 2019-12-17

## 2019-12-17 DIAGNOSIS — F10.20 ALCOHOL USE DISORDER, SEVERE, DEPENDENCE (H): ICD-10-CM

## 2019-12-18 ENCOUNTER — COMMUNICATION - HEALTHEAST (OUTPATIENT)
Dept: BEHAVIORAL HEALTH | Facility: CLINIC | Age: 59
End: 2019-12-18

## 2019-12-18 DIAGNOSIS — F43.22 ADJUSTMENT DISORDER WITH ANXIETY: ICD-10-CM

## 2019-12-19 ENCOUNTER — AMBULATORY - HEALTHEAST (OUTPATIENT)
Dept: BEHAVIORAL HEALTH | Facility: CLINIC | Age: 59
End: 2019-12-19

## 2019-12-23 ENCOUNTER — AMBULATORY - HEALTHEAST (OUTPATIENT)
Dept: ADDICTION MEDICINE | Facility: CLINIC | Age: 59
End: 2019-12-23

## 2019-12-24 ENCOUNTER — OFFICE VISIT - HEALTHEAST (OUTPATIENT)
Dept: ADDICTION MEDICINE | Facility: CLINIC | Age: 59
End: 2019-12-24

## 2019-12-24 DIAGNOSIS — F10.20 ALCOHOL USE DISORDER, SEVERE, DEPENDENCE (H): ICD-10-CM

## 2019-12-31 ENCOUNTER — OFFICE VISIT - HEALTHEAST (OUTPATIENT)
Dept: ADDICTION MEDICINE | Facility: CLINIC | Age: 59
End: 2019-12-31

## 2019-12-31 ENCOUNTER — AMBULATORY - HEALTHEAST (OUTPATIENT)
Dept: ADDICTION MEDICINE | Facility: CLINIC | Age: 59
End: 2019-12-31

## 2019-12-31 DIAGNOSIS — F10.20 ALCOHOL USE DISORDER, SEVERE, DEPENDENCE (H): ICD-10-CM

## 2020-01-02 ENCOUNTER — AMBULATORY - HEALTHEAST (OUTPATIENT)
Dept: BEHAVIORAL HEALTH | Facility: CLINIC | Age: 60
End: 2020-01-02

## 2020-01-02 ENCOUNTER — COMMUNICATION - HEALTHEAST (OUTPATIENT)
Dept: ADDICTION MEDICINE | Facility: CLINIC | Age: 60
End: 2020-01-02

## 2020-01-07 ENCOUNTER — AMBULATORY - HEALTHEAST (OUTPATIENT)
Dept: ADDICTION MEDICINE | Facility: CLINIC | Age: 60
End: 2020-01-07

## 2020-01-07 ENCOUNTER — OFFICE VISIT - HEALTHEAST (OUTPATIENT)
Dept: ADDICTION MEDICINE | Facility: CLINIC | Age: 60
End: 2020-01-07

## 2020-01-07 DIAGNOSIS — F10.20 ALCOHOL USE DISORDER, SEVERE, DEPENDENCE (H): ICD-10-CM

## 2020-01-14 ENCOUNTER — OFFICE VISIT - HEALTHEAST (OUTPATIENT)
Dept: ADDICTION MEDICINE | Facility: CLINIC | Age: 60
End: 2020-01-14

## 2020-01-14 ENCOUNTER — AMBULATORY - HEALTHEAST (OUTPATIENT)
Dept: ADDICTION MEDICINE | Facility: CLINIC | Age: 60
End: 2020-01-14

## 2020-01-14 DIAGNOSIS — F10.20 ALCOHOL USE DISORDER, SEVERE, DEPENDENCE (H): ICD-10-CM

## 2020-01-15 ENCOUNTER — COMMUNICATION - HEALTHEAST (OUTPATIENT)
Dept: BEHAVIORAL HEALTH | Facility: CLINIC | Age: 60
End: 2020-01-15

## 2020-01-15 ENCOUNTER — OFFICE VISIT - HEALTHEAST (OUTPATIENT)
Dept: BEHAVIORAL HEALTH | Facility: CLINIC | Age: 60
End: 2020-01-15

## 2020-01-15 DIAGNOSIS — F31.30 BIPOLAR I DISORDER, MOST RECENT EPISODE DEPRESSED (H): ICD-10-CM

## 2020-01-16 ENCOUNTER — AMBULATORY - HEALTHEAST (OUTPATIENT)
Dept: ADDICTION MEDICINE | Facility: CLINIC | Age: 60
End: 2020-01-16

## 2020-01-21 ENCOUNTER — OFFICE VISIT - HEALTHEAST (OUTPATIENT)
Dept: ADDICTION MEDICINE | Facility: CLINIC | Age: 60
End: 2020-01-21

## 2020-01-21 ENCOUNTER — AMBULATORY - HEALTHEAST (OUTPATIENT)
Dept: ADDICTION MEDICINE | Facility: CLINIC | Age: 60
End: 2020-01-21

## 2020-01-21 DIAGNOSIS — F10.20 ALCOHOL USE DISORDER, SEVERE, DEPENDENCE (H): ICD-10-CM

## 2020-01-22 ENCOUNTER — COMMUNICATION - HEALTHEAST (OUTPATIENT)
Dept: BEHAVIORAL HEALTH | Facility: CLINIC | Age: 60
End: 2020-01-22

## 2020-01-23 ENCOUNTER — COMMUNICATION - HEALTHEAST (OUTPATIENT)
Dept: BEHAVIORAL HEALTH | Facility: CLINIC | Age: 60
End: 2020-01-23

## 2020-01-23 ENCOUNTER — OFFICE VISIT - HEALTHEAST (OUTPATIENT)
Dept: BEHAVIORAL HEALTH | Facility: CLINIC | Age: 60
End: 2020-01-23

## 2020-01-23 DIAGNOSIS — F43.22 ADJUSTMENT DISORDER WITH ANXIETY: ICD-10-CM

## 2020-01-23 DIAGNOSIS — F31.30 BIPOLAR I DISORDER, MOST RECENT EPISODE DEPRESSED (H): ICD-10-CM

## 2020-01-24 ENCOUNTER — AMBULATORY - HEALTHEAST (OUTPATIENT)
Dept: BEHAVIORAL HEALTH | Facility: CLINIC | Age: 60
End: 2020-01-24

## 2020-01-27 ENCOUNTER — OFFICE VISIT - HEALTHEAST (OUTPATIENT)
Dept: BEHAVIORAL HEALTH | Facility: CLINIC | Age: 60
End: 2020-01-27

## 2020-01-27 ENCOUNTER — COMMUNICATION - HEALTHEAST (OUTPATIENT)
Dept: BEHAVIORAL HEALTH | Facility: CLINIC | Age: 60
End: 2020-01-27

## 2020-01-27 DIAGNOSIS — F43.22 ADJUSTMENT DISORDER WITH ANXIETY: ICD-10-CM

## 2020-01-27 DIAGNOSIS — F31.30 BIPOLAR I DISORDER, MOST RECENT EPISODE DEPRESSED (H): ICD-10-CM

## 2020-01-27 DIAGNOSIS — F10.20 ALCOHOL USE DISORDER, SEVERE, DEPENDENCE (H): ICD-10-CM

## 2020-01-27 LAB
AMPHETAMINES UR QL SCN: NORMAL
BARBITURATES UR QL: NORMAL
BENZODIAZ UR QL: NORMAL
CANNABINOIDS UR QL SCN: NORMAL
COCAINE UR QL: NORMAL
CREAT UR-MCNC: 14.3 MG/DL
METHADONE UR QL SCN: NORMAL
OPIATES UR QL SCN: NORMAL
OXYCODONE UR QL: NORMAL
PCP UR QL SCN: NORMAL

## 2020-01-27 ASSESSMENT — PATIENT HEALTH QUESTIONNAIRE - PHQ9: SUM OF ALL RESPONSES TO PHQ QUESTIONS 1-9: 8

## 2020-01-27 ASSESSMENT — ANXIETY QUESTIONNAIRES
4. TROUBLE RELAXING: SEVERAL DAYS
GAD7 TOTAL SCORE: 10
1. FEELING NERVOUS, ANXIOUS, OR ON EDGE: MORE THAN HALF THE DAYS
3. WORRYING TOO MUCH ABOUT DIFFERENT THINGS: SEVERAL DAYS
IF YOU CHECKED OFF ANY PROBLEMS ON THIS QUESTIONNAIRE, HOW DIFFICULT HAVE THESE PROBLEMS MADE IT FOR YOU TO DO YOUR WORK, TAKE CARE OF THINGS AT HOME, OR GET ALONG WITH OTHER PEOPLE: VERY DIFFICULT
6. BECOMING EASILY ANNOYED OR IRRITABLE: NEARLY EVERY DAY
7. FEELING AFRAID AS IF SOMETHING AWFUL MIGHT HAPPEN: NOT AT ALL
5. BEING SO RESTLESS THAT IT IS HARD TO SIT STILL: NEARLY EVERY DAY
2. NOT BEING ABLE TO STOP OR CONTROL WORRYING: NOT AT ALL

## 2020-01-27 ASSESSMENT — MIFFLIN-ST. JEOR: SCORE: 1528.4

## 2020-01-28 ENCOUNTER — OFFICE VISIT - HEALTHEAST (OUTPATIENT)
Dept: ADDICTION MEDICINE | Facility: CLINIC | Age: 60
End: 2020-01-28

## 2020-01-28 DIAGNOSIS — F10.20 ALCOHOL USE DISORDER, SEVERE, DEPENDENCE (H): ICD-10-CM

## 2020-01-30 LAB
ETHYL GLUCURONIDE UR CFM-MCNC: <100 NG/ML
ETHYL SULFATE UR CFM-MCNC: <100 NG/ML

## 2020-01-31 ENCOUNTER — AMBULATORY - HEALTHEAST (OUTPATIENT)
Dept: ADDICTION MEDICINE | Facility: CLINIC | Age: 60
End: 2020-01-31

## 2020-01-31 ENCOUNTER — COMMUNICATION - HEALTHEAST (OUTPATIENT)
Dept: ADDICTION MEDICINE | Facility: CLINIC | Age: 60
End: 2020-01-31

## 2020-02-04 ENCOUNTER — COMMUNICATION - HEALTHEAST (OUTPATIENT)
Dept: ADDICTION MEDICINE | Facility: CLINIC | Age: 60
End: 2020-02-04

## 2020-02-06 ENCOUNTER — COMMUNICATION - HEALTHEAST (OUTPATIENT)
Dept: BEHAVIORAL HEALTH | Facility: CLINIC | Age: 60
End: 2020-02-06

## 2020-02-18 ENCOUNTER — COMMUNICATION - HEALTHEAST (OUTPATIENT)
Dept: BEHAVIORAL HEALTH | Facility: CLINIC | Age: 60
End: 2020-02-18

## 2020-02-18 DIAGNOSIS — F10.20 SEVERE ALCOHOL USE DISORDER (H): ICD-10-CM

## 2020-02-18 DIAGNOSIS — K21.9 GASTROESOPHAGEAL REFLUX DISEASE, ESOPHAGITIS PRESENCE NOT SPECIFIED: ICD-10-CM

## 2020-02-19 ENCOUNTER — AMBULATORY - HEALTHEAST (OUTPATIENT)
Dept: BEHAVIORAL HEALTH | Facility: CLINIC | Age: 60
End: 2020-02-19

## 2020-02-19 DIAGNOSIS — F10.20 SEVERE ALCOHOL USE DISORDER (H): ICD-10-CM

## 2020-02-19 DIAGNOSIS — F31.30 BIPOLAR I DISORDER, MOST RECENT EPISODE DEPRESSED (H): ICD-10-CM

## 2020-02-19 DIAGNOSIS — F43.22 ADJUSTMENT DISORDER WITH ANXIETY: ICD-10-CM

## 2020-03-11 ENCOUNTER — COMMUNICATION - HEALTHEAST (OUTPATIENT)
Dept: BEHAVIORAL HEALTH | Facility: CLINIC | Age: 60
End: 2020-03-11

## 2021-05-26 ASSESSMENT — PATIENT HEALTH QUESTIONNAIRE - PHQ9
SUM OF ALL RESPONSES TO PHQ QUESTIONS 1-9: 3
SUM OF ALL RESPONSES TO PHQ QUESTIONS 1-9: 4

## 2021-05-27 ASSESSMENT — PATIENT HEALTH QUESTIONNAIRE - PHQ9: SUM OF ALL RESPONSES TO PHQ QUESTIONS 1-9: 8

## 2021-05-28 ASSESSMENT — ANXIETY QUESTIONNAIRES
GAD7 TOTAL SCORE: 10
GAD7 TOTAL SCORE: 10
GAD7 TOTAL SCORE: 2

## 2021-05-30 NOTE — PROGRESS NOTES
The patient and this writer met for 35 minutes to discuss treatment goals and plans. The patient reports no use since his discharge from [facility]. He reports that he has some dental concerns however does not have dental insurance due to his insurance type-he requested that dental options be given to him as resources so he can have work done. The patient reports that he is doing well with mental health he reports he is medication compliant and feeling good about where he is with his mental health. He did state that he is not currently interested in individual therapy however would think about it and let me know if he would like a referral. The patient reports he is very introverted and does not like to talk a lot in the group setting however states he is getting something out of being here. The patient would like to better develop relapse prevention skills specifically surrounding his mental health and substance use. He reports he has court on 8/5/19 for a DUI. He is residing at the [facility] however would like resources for other housing. The patient is going to remain in programming and schedule 1x1 as necessary- no immediate changes are needed. No SI/SIB/HI reported at the time of this meeting.     ROSARIO Baird 7/10/2019 3:50 PM

## 2021-05-30 NOTE — PROGRESS NOTES
Willie Dougherty attended 3 hours of group on 7/5/2019.     Patients engagement in the group session: medium     Total number of patients present 5.     Supervising MD: Dr. Manning Dr. Brunner Samantha S Peterson, [facility] , 7/5/2019 , 2:13 PM

## 2021-05-30 NOTE — PROGRESS NOTES
Willie Dougherty attended 3 hours of group on 7/29/2019.     The group topic was PAWS, patient was responsive to topic.     Patients engagement in the group session: medium     Total number of patients present 9.     Supervising MD: Dr. Sai Olea, [facility], 7/29/2019, 2:45 PM

## 2021-05-30 NOTE — PROGRESS NOTES
Willie Dougherty attended 3 hours of group on 7/15/2019.     The group topic was Relapse Prevention-Grief Issues, patient was responsive to topic.     Patients engagement in the group session: high     Total number of patients present 9.     Supervising MD: Dr. Sai Matthews, [facility], 7/15/2019, 12:11 PM

## 2021-05-30 NOTE — PROGRESS NOTES
"Addiction Services - Initial Services Plan     Patient  Name: Willie Dougherty  MRN: [id number]   : 1960  Admit Date: 19       Patient describes their immediate need: To learn recovery skills to prevent relapse. Patient denies all other immediate needs at this time.     Are there any immediate Safety Needs such as (physical, stability, mobility): Pt is able to get medical care as needed. Pt denies immediate concerns.     Immediate Health Needs and Plan:   Obtain PCP.   Seek medical attention as necessary.   Continue taking all medication as prescribed.     Vulnerable Adult: No     Issues to be addressed in the first sessions:   Relapse prevention    Patient strengths and needs:   Strengths identified as \"I'm a good listener, honest, compassionate, and intelligent.\"   Needs identified as \"increasing social network, and be more active.\"     Plan for patient for time between intake and completion of the treatment plan:   Attend all group therapy sessions as directed, complete all written and oral assignments as directed, and remain clean and sober. A relapse, if any, must be reported to staff immediately in order to ensure you are receiving the proper level of care. If you cannot attend a group therapy session you must call contact information provided in intake folder and leave a message before or during group hours.       Vulnerable Adult Review   [X] Review of the Facility Abuse Prevention plan was reviewed with the patient   [X] No Individual Abuse Plan is necessary   [ ] In addition to the Facility Abuse Prevention plan, an Individual Abuse Plan will be put in place       Staff Name/Title: ROSARIO Gilmore   Date: 2019  Time: 10:27 AM        "

## 2021-05-30 NOTE — PROGRESS NOTES
Correct pharmacy verified with patient and confirmed in snapshot? [x] yes []no    Charge captured ? [x] yes  [] no    Medications Phoned  to Pharmacy [] yes [x]no  Name of Pharmacist:  List Medications, including dose, quantity and instructions      Medication Prescriptions given to patient   [] yes  [x] no   List the name of the drug the prescription was written for.       Medications ordered this visit were e-scribed.  Verified by order class [x] yes  [] no  Wellbutrin, Gabapentin, and omeprazole  Medication changes or discontinuations were communicated to patient's pharmacy: [] yes  [x] no    UA collected [x] yes  [] no    [place] Prescription Monitoring Program Reviewed? [x] yes  [] no    Referrals were made to:  none    Future appointment was made: [x] yes  [] no  8/21/19  Dictation completed at time of chart check: [] yes  [x] no    I have checked the documentation for today s encounters and the above information has been reviewed and completed.

## 2021-05-30 NOTE — PROGRESS NOTES
Willie Dougherty attended 3 hours of group on 7/22/2019.     The group topic was Relapse Prevention, patient was responsive to topic.     Patients engagement in the group session: medium     Total number of patients present 8.     Supervising MD: Dr. Sai Olea, [facility], 7/22/2019, 2:59 PM

## 2021-05-30 NOTE — PROGRESS NOTES
Weekly Progress Note  Willie Dougherty  1960  [id number]      D) Pt attended 2 groups this week with 0 absences. Patient attended 1 individual sessions this week. Patient is in phase I of SRP.  A) Staff facilitated groups and reviewed tx progress. [facility] for [facility]. R) No VAP needed at this time.   Any significant events, defines as events that impact patients relationship with others inside and outside of treatment: None at this time   Indicate any changes or monitoring of physical or mental health problems: No emergent concerns     Indicate involvement by any outside supports: Sober living   IAPP reviewed and modified as needed. NA  Patient was staffed with Dr. Gibbs and Dahlia Penn [facility] on 7/11/19.   Pt working on the following dimensions:  Dimension #1 - Withdrawal Potential - Risk 0. Patient reports a history of regular alcohol use. He states that he was recently discharged from residential treatment at [facility]. Patient denies any alcohol use since being out of treatment. He reports his last use of alcohol was in January 2019. Patient denies any current or past withdrawal symptoms. He shows no physical signs or symptoms of intoxication or withdrawal. Patient is oriented x4  Specific goals from treatment plan addressed this week: The patient goal is to maintain sobriety . He reports no use over the last week.   Effectiveness of strategies: Strategies appear to be effective    Dimension #2 - Biomedical - Risk 0. Patient denies any current health issues or concerns. He does not currently have a PCP, but appears able to get his medical needs met and addressed as necessary. Patient denies any immediate medical needs or concerns. Patient appears to be in adequate physical health and functioning at this time  Specific goals from treatment plan addressed this week: The patient goal is to manage biomedical concerns. At this time he is not endorsing any emergent concerns.   Effectiveness of strategies: Strategies  appear to be effective.     Dimension #3 - Emotional/Behavioral/Cognitive - Risk 1. Patient reports mental health diagnoses of depression and bipolar disorder. Patient does not currently have any mental health services as he was recently discharged from residential treatment. He does endorse that he will be seeking psychiatry services. Patient endorses medication to help manage his mental health symptoms. Patient denies any suicidal or homicidal thoughts or plans. Patient is oriented x4. .  Specific goals from treatment plan addressed this week: The patient goal is to manage mental health. He reports no emergent mental health concerns and reports he is medication compliant at this time.   Effectiveness of strategies: Strategies appear to be effective.     Dimension #4 - Treatment Acceptance/Resistance - Risk 1. Patient reports that he does feel that he is required to be here for treatment, and he would have liked a less intensive program. Patient does verbalize a desire to change his substance use at this time. Patient does appear ambivalent about his need for continued treatment at this time. He does verbalize willingness to follow treatment recommendations at this time. Patient was calm, cooperative, and appropriately behaved throughout this appointment  Specific goals from treatment plan addressed this week: The patient goal is to follow through with intentions to treat chemical dependency concerns. The patient has been a good group participant and has engaged in individual sessions   Effectiveness of strategies: Strategies appear to be effective.     Dimension #5 - Relapse Potential - Risk 3. Patient reports that he just completed his first treatment. He therefore appears to have some knowledge of addiction, but could benefit from additional education. Patient appears to lack some insight into his use and the impact it had on his life as he appears to minimize his need for treatment and change despite the  negative consequences he has experienced. Patient appears to lack the necessary coping skills at this time to help him prevent relapse as he reports just having gotten out of residential treatment, and minimal periods of sobriety recently. Patient therefore appears to currently be at an increased risk of relapse  Specific goals from treatment plan addressed this week: The patient goal is to identify and implement coping skills. The patient reports no triggers over the last week. The patient reports he has been utilizing his coping skills effectively   Effectiveness of strategies: Strategies appear to be effective.     Dimension #6 - Recovery Environment - Risk 3. Patient is currently unemployed, and so appears to lack structure for his daily life. He was able to identify hobbies and activities he enjoys, but it did not appear that he was actively engaging in them prior to going to treatment. Patient reports that he is currently living at a sober house, and so has some support from his living environment. Patient reports that his family is supportive of his sobriety, and about half of his friends are as well. Some of the patient's friends could increase his risk of relapse as they drank together historically. Patient endorses pending legal consequences from a DWI that happened in August 2018. He denies any other current legal involvement.  Specific goals from treatment plan addressed this week: The patient goal is to develop sober structured activities. At this time he is attending sober support groups and engaging in structured activities through his sober house.   Effectiveness of strategies: Strategies appear to be effective    T) Treatment plan updated yes and co-signed by Dr. Gibbs.  Patient notified and in agreement Yes.  Patient educated on Sober Structure. Patient has completed 10 of 108 program hours at this time. Projected discharge date is 10/30/19. Current discharge plan is Phase III.     Mayte PATRICIA  ROSARIO Olea  7/11/2019, 3:58 PM          Psycho-Educational Curriculum  Date Attended  Psycho-Educational Curriculum  Date Attended    8 Dimensions of wellness   Grief and Loss     Emotional   Stages of grief    Physical   Memorialized     Intellectual   Letters to loved ones     Occupational   Grief group    Spiritual   Loneliness    Environmental   Purpose and Hobbies    Financial   Values    Social   Hobbies    Access to Care   Pat      Service Access   Volunteer Work     Pain Management   Experiential Learning     Memory   Value of movement     Physical Wellness  Relationships     Medication   Self-Love     PAWS   Resentment    Hygiene (Sleep, Physical, etc)   Communication Skills     Emotional Wellbeing   Amends     Healthy vs. Unhealthy Feelings  Family     Affirmations  Social Anxieties     Co-Occurring Disorders  Legacy     Anxiety   Support(+ & -)    Depression  Assertive Communication     Grounding  Codependency    Trauma/Victim Identity   Boundaries    MH/CD Acceptance   Defense Mechanisms     Sober Structure  7/5-7/12 Relapse Prevention     Sober support groups   Triggers and High Risk Situations    Needds  Relapse Prevention Plan     Spirituality   Coping Skills     Schedule   Addictive Thoughts    Sobriety Vs. Recovery   Relapse Process     Power of Now   What is Addiction     Truth in life   Impulsive/Compulsive Behaviors     Recovery Investement   Cross Addiction     Recovery Influences   Early Recovery     Educational Videos   Mindfulness    No Kidding Me 2!       Anonymous People       Ike's Story       Pleasure Unwoven

## 2021-05-30 NOTE — PROGRESS NOTES
Willie Dougherty attended 3  hours of group on 7/8/2019.     Patients engagement in the group session: medium     Total number of patients present 8.     Supervising MD: Dr. Sai Olea, [facility] , 7/8/2019 , 3:19 PM

## 2021-05-30 NOTE — PROGRESS NOTES
Willie Dougherty attended 3 hours of group on 7/24/2019.     The group topic was Relapse Prevention, patient was responsive to topic.     Patients engagement in the group session: medium     Total number of patients present 11.     Supervising MD: Dr. Sai Olea, [facility], 7/24/2019, 1:38 PM

## 2021-05-30 NOTE — PROGRESS NOTES
Weekly Progress Note  Willie Dougherty  1960  [id number]      D) Pt attended 3 groups this week with 0 absences. Patient attended 1 individual sessions this week. Patient is in phase I of SRP.  A) Staff facilitated groups and reviewed tx progress. Assessed for [facility]. R) No VAP needed at this time.   Any significant events, defines as events that impact patients relationship with others inside and outside of treatment: None at this time   Indicate any changes or monitoring of physical or mental health problems: No emergent concerns     Indicate involvement by any outside supports: Sober living   IAPP reviewed and modified as needed. NA  Patient was staffed with Dr. Gibbs and Dahlia Penn [facility] on 7/18/19.   Pt working on the following dimensions:  Dimension #1 - Withdrawal Potential - Risk 0. Patient reports a history of regular alcohol use. He states that he was recently discharged from residential treatment at [place] [facility]. Patient denies any alcohol use since being out of treatment. He reports his last use of alcohol was in January 2019. Patient denies any current or past withdrawal symptoms. He shows no physical signs or symptoms of intoxication or withdrawal. Patient is oriented x4  Specific goals from treatment plan addressed this week: The patient goal is to maintain sobriety . The patient reports maintained abstinence over the last week. No changes or concerns.   Effectiveness of strategies: Strategies appear to be effective    Dimension #2 - Biomedical - Risk 0. Patient denies any current health issues or concerns. He does not currently have a PCP, but appears able to get his medical needs met and addressed as necessary. Patient denies any immediate medical needs or concerns. Patient appears to be in adequate physical health and functioning at this time  Specific goals from treatment plan addressed this week: The patient goal is to manage biomedical concerns. The patient is not endorsing any concerns at this  time. He was given information on dental work and we did fill out the [facility] paperwork for supplemental funding for tx services.   Effectiveness of strategies: Strategies appear to be effective.     Dimension #3 - Emotional/Behavioral/Cognitive - Risk 1. Patient reports mental health diagnoses of depression and bipolar disorder. Patient does not currently have any mental health services as he was recently discharged from residential treatment. He does endorse that he will be seeking psychiatry services. Patient endorses medication to help manage his mental health symptoms. Patient denies any suicidal or homicidal thoughts or plans. Patient is oriented x4. .  Specific goals from treatment plan addressed this week: The patient goal is to manage mental health. The patient reports stable mental health and medication compliance.   Effectiveness of strategies: Strategies appear to be effective.     Dimension #4 - Treatment Acceptance/Resistance - Risk 1. Patient reports that he does feel that he is required to be here for treatment, and he would have liked a less intensive program. Patient does verbalize a desire to change his substance use at this time. Patient does appear ambivalent about his need for continued treatment at this time. He does verbalize willingness to follow treatment recommendations at this time. Patient was calm, cooperative, and appropriately behaved throughout this appointment  Specific goals from treatment plan addressed this week: The patient goal is to follow through with intentions to treat chemical dependency concerns. The patient reports he is motivated for treatment services and is an engaged group member.   Effectiveness of strategies: Strategies appear to be effective.     Dimension #5 - Relapse Potential - Risk 3. Patient reports that he just completed his first treatment. He therefore appears to have some knowledge of addiction, but could benefit from additional education. Patient appears to  lack some insight into his use and the impact it had on his life as he appears to minimize his need for treatment and change despite the negative consequences he has experienced. Patient appears to lack the necessary coping skills at this time to help him prevent relapse as he reports just having gotten out of residential treatment, and minimal periods of sobriety recently. Patient therefore appears to currently be at an increased risk of relapse  Specific goals from treatment plan addressed this week: The patient goal is to identify and implement coping skills. The patient did not identify any triggers or urges over the last week. He reports implementation of coping skills and feels they are working effectively.   Effectiveness of strategies: Strategies appear to be effective.     Dimension #6 - Recovery Environment - Risk 3. Patient is currently unemployed, and so appears to lack structure for his daily life. He was able to identify hobbies and activities he enjoys, but it did not appear that he was actively engaging in them prior to going to treatment. Patient reports that he is currently living at a sober house, and so has some support from his living environment. Patient reports that his family is supportive of his sobriety, and about half of his friends are as well. Some of the patient's friends could increase his risk of relapse as they drank together historically. Patient endorses pending legal consequences from a DWI that happened in August 2018. He denies any other current legal involvement.  Specific goals from treatment plan addressed this week: The patient goal is to develop sober structured activities. The patient is starting to engage with sober peers and is finding benefit in being around people who are working on their recovery as well.   Effectiveness of strategies: Strategies appear to be effective    T) Treatment plan updated no.  Patient notified and in agreement NA.  Patient educated on Sober  Structure & Relapse Prevention. Patient has completed 19 of 108 program hours at this time. Projected discharge date is 10/30/19. Current discharge plan is Phase III.     ROSARIO Baird  7/19/2019, 2:46 PM          Psycho-Educational Curriculum  Date Attended  Psycho-Educational Curriculum  Date Attended    8 Dimensions of wellness   Grief and Loss     Emotional   Stages of grief    Physical   Memorialized     Intellectual   Letters to loved ones     Occupational   Grief group    Spiritual   Loneliness    Environmental   Purpose and Hobbies    Financial   Values    Social   Hobbies    Access to Care   Pat      Service Access   Volunteer Work     Pain Management   Experiential Learning     Memory   Value of movement     Physical Wellness  Relationships     Medication   Self-Love     PAWS   Resentment    Hygiene (Sleep, Physical, etc)   Communication Skills     Emotional Wellbeing   Amends     Healthy vs. Unhealthy Feelings  Family     Affirmations  Social Anxieties     Co-Occurring Disorders  Legacy     Anxiety   Support(+ & -)    Depression  Assertive Communication     Grounding  Codependency    Trauma/Victim Identity   Boundaries    MH/CD Acceptance   Defense Mechanisms     Sober Structure  7/5-7/12 Relapse Prevention  7/15-7/19   Sober support groups   Triggers and High Risk Situations    Needds  Relapse Prevention Plan     Spirituality   Coping Skills     Schedule   Addictive Thoughts    Sobriety Vs. Recovery   Relapse Process     Power of Now   What is Addiction     Truth in life   Impulsive/Compulsive Behaviors     Recovery Investement   Cross Addiction     Recovery Influences   Early Recovery     Educational Videos   Mindfulness    No Kidding Me 2!       Anonymous People       Ike's Story       Pleasure Unwoven

## 2021-05-30 NOTE — PROGRESS NOTES
"Intake Note:     D) Willie Dougherty is a 59 y.o.  single White or  male who is referred to [facility] via [facility] with funding from Medicare. Patient orientated x 3. Patient meets criteria for Alcohol Use Disorder, Severe (F10.20).  Patient appears appropriate for [facility].   A) Met with patient for 35 minutes.  Completed intake assessment and preliminary paperwork. Patient was given and explained counselor & supervisor license number and contact info, Patient Bill of Rights, program rules/regulations, Program Abuse Prevention Plan, confidentiality & HIPPA policies, grievance procedure, presented ROIs, TB & HIV/AIDS policies & resources, and Vulnerable Adult policy.   Conducted Vulnerable Adult Assessment.   R)No special Vulnerable Adult needed at this time.  Patient signed and agreed to counselor & supervisor license number and contact info., Patient Bill of Rights, group rules/regulations, Program Abuse Prevention Plan, confidentiality & HIPPA policies, grievance procedure,  ROIs, TB & HIV/AIDS policies & resources, and Vulnerable Adult policy. Patient scored Moderate Risk on C-SSRS screen. Patient denied suicidal ideation/intent/plan/means at this time.     Opioid Use Disorder: No   Provided \"Options for Opioid Treatment in [place] and Overdose Prevention\" No     Dimension #1 - Withdrawal Potential - Risk 0. Patient reports a history of regular alcohol use. He states that he was recently discharged from residential treatment at [facility]. Patient denies any alcohol use since being out of treatment. He reports his last use of alcohol was in January 2019. Patient denies any current or past withdrawal symptoms. He shows no physical signs or symptoms of intoxication or withdrawal. Patient is oriented x4.    Dimension #2 - Biomedical - Risk 0. Patient denies any current health issues or concerns. He does not currently have a PCP, but appears able to get his medical needs met and addressed " as necessary. Patient denies any immediate medical needs or concerns. Patient appears to be in adequate physical health and functioning at this time.     Dimension #3 - Emotional , Behavioral and Cognitive - Risk 1. Patient reports mental health diagnoses of depression and bipolar disorder. Patient does not currently have any mental health services as he was recently discharged from residential treatment. He does endorse that he will be seeking psychiatry services. Patient endorses medication to help manage his mental health symptoms. Patient denies any suicidal or homicidal thoughts or plans. Patient is oriented x4.      Dimension #4 - Readiness for Change - Risk 1. Patient reports that he does feel that he is required to be here for treatment, and he would have liked a less intensive program. Patient does verbalize a desire to change his substance use at this time. Patient does appear ambivalent about his need for continued treatment at this time. He does verbalize willingness to follow treatment recommendations at this time. Patient was calm, cooperative, and appropriately behaved throughout this appointment.     Dimension #5 - Relapse Potential - Risk 3. Patient reports that he just completed his first treatment. He therefore appears to have some knowledge of addiction, but could benefit from additional education. Patient appears to lack some insight into his use and the impact it had on his life as he appears to minimize his need for treatment and change despite the negative consequences he has experienced. Patient appears to lack the necessary coping skills at this time to help him prevent relapse as he reports just having gotten out of residential treatment, and minimal periods of sobriety recently. Patient therefore appears to currently be at an increased risk of relapse.     Dimension #6 - Recovery Environment - Risk 3. Patient is currently unemployed, and so appears to lack structure for his daily life. He  was able to identify hobbies and activities he enjoys, but it did not appear that he was actively engaging in them prior to going to treatment. Patient reports that he is currently living at a sober house, and so has some support from his living environment. Patient reports that his family is supportive of his sobriety, and about half of his friends are as well. Some of the patient's friends could increase his risk of relapse as they drank together historically. Patient endorses pending legal consequences from a DWI that happened in August 2018. He denies any other current legal involvement.    T) Explained counselor & supervisor license number and contact info, Patient Bill of Rights, program rules/regulations, Program Abuse Prevention Plan, confidentiality & HIPPA policies, grievance procedure, presented ROIs, TB & HIV/AIDS policies & resources, and Vulnerable Adult policy. Patient expected to start group on 7/5/19.      ROSARIO Gilmore  7/2/2019, 10:27 AM

## 2021-05-30 NOTE — PROGRESS NOTES
Correct pharmacy verified with patient and confirmed in snapshot? [x] yes []no    Charge captured ? [x] yes  [] no    Medications Phoned  to Pharmacy [] yes [x]no  Name of Pharmacist:  List Medications, including dose, quantity and instructions      Medication Prescriptions given to patient   [] yes  [x] no   List the name of the drug the prescription was written for.       Medications ordered this visit were e-scribed.  Verified by order class [x] yes  [] no wellbutrin 150mg    Medication changes or discontinuations were communicated to patient's pharmacy: [] yes  [x] no    UA collected [x] yes  [] no    [place] Prescription Monitoring Program Reviewed? [x] yes  [] no    Referrals were made to:  Yes, psychology    Future appointment was made: [x] yes  [] no    Dictation completed at time of chart check: [x] yes  [] no    I have checked the documentation for today s encounters and the above information has been reviewed and completed.

## 2021-05-30 NOTE — PROGRESS NOTES
Willie Dougherty attended 3 hours of group on 7/19/2019.     The group topic was Relapse Prevention, patient was responsive to topic.     Patients engagement in the group session: medium     Total number of patients present 8.     Supervising MD: Dr. Param Olea, [facility], 7/19/2019, 2:30 PM

## 2021-05-30 NOTE — PROGRESS NOTES
Willie Dougherty attended 3 hours of group on 7/12/2019.     The group topic was Sober Structure, patient was responsive to topic.     Patients engagement in the group session: high     Total number of patients present 8.     Supervising MD: Dr. Sai Olea, [facility], 7/12/2019, 11:32 AM

## 2021-05-30 NOTE — PROGRESS NOTES
Willie Dougherty attended 3 hours of group on 7/26/2019.     The group topic was Relapse Prevention, patient was responsive to topic.     Patients engagement in the group session: high     Total number of patients present 7.     Supervising MD: Dr. Param Olea, [facility], 7/26/2019, 1:43 PM

## 2021-05-30 NOTE — PROGRESS NOTES
---LATE ENTRY---  Weekly Progress Note  Willie Dougherty  1960  [id number]      D) Pt attended 3 groups this week with 0 absences. Patient attended 1 individual sessions this week. Patient is in phase I of SRP.  A) Staff facilitated groups and reviewed tx progress. Assessed for [facility]. R) No VAP needed at this time.   Any significant events, defines as events that impact patients relationship with others inside and outside of treatment: None at this time   Indicate any changes or monitoring of physical or mental health problems: No emergent concerns     Indicate involvement by any outside supports: Sober living   IAPP reviewed and modified as needed. NA  Patient was staffed with Dr. Gibbs and Dahlia Penn [facility] on 7/18/19.   Pt working on the following dimensions:  Dimension #1 - Withdrawal Potential - Risk 0. Patient reports a history of regular alcohol use. He states that he was recently discharged from residential treatment at [facility]. Patient denies any alcohol use since being out of treatment. He reports his last use of alcohol was in January 2019. Patient denies any current or past withdrawal symptoms. He shows no physical signs or symptoms of intoxication or withdrawal. Patient is oriented x4  Specific goals from treatment plan addressed this week: The patient goal is to maintain sobriety . No use reported at this time.   Effectiveness of strategies: Strategies appear to be effective    Dimension #2 - Biomedical - Risk 0. Patient denies any current health issues or concerns. He does not currently have a PCP, but appears able to get his medical needs met and addressed as necessary. Patient denies any immediate medical needs or concerns. Patient appears to be in adequate physical health and functioning at this time  Specific goals from treatment plan addressed this week: The patient goal is to manage biomedical concerns. No new or emergent concerns. Patient has access to care as needed. .   Effectiveness of  strategies: Strategies appear to be effective.     Dimension #3 - Emotional/Behavioral/Cognitive - Risk 1. Patient reports mental health diagnoses of depression and bipolar disorder. Patient does not currently have any mental health services as he was recently discharged from residential treatment. He does endorse that he will be seeking psychiatry services. Patient endorses medication to help manage his mental health symptoms. Patient denies any suicidal or homicidal thoughts or plans. Patient is oriented x4. .  Specific goals from treatment plan addressed this week: The patient goal is to manage mental health.The patient continues to report managed mental health and medication compliance. At this time he is not endorsing any new or worsening mental health concerns.    Effectiveness of strategies: Strategies appear to be effective.     Dimension #4 - Treatment Acceptance/Resistance - Risk 1. Patient reports that he does feel that he is required to be here for treatment, and he would have liked a less intensive program. Patient does verbalize a desire to change his substance use at this time. Patient does appear ambivalent about his need for continued treatment at this time. He does verbalize willingness to follow treatment recommendations at this time. Patient was calm, cooperative, and appropriately behaved throughout this appointment  Specific goals from treatment plan addressed this week: The patient goal is to follow through with intentions to treat chemical dependency concerns. The patient is starting to engage more in the group setting and has been doing well with feedback.   Effectiveness of strategies: Strategies appear to be effective.     Dimension #5 - Relapse Potential - Risk 3. Patient reports that he just completed his first treatment. He therefore appears to have some knowledge of addiction, but could benefit from additional education. Patient appears to lack some insight into his use and the impact  it had on his life as he appears to minimize his need for treatment and change despite the negative consequences he has experienced. Patient appears to lack the necessary coping skills at this time to help him prevent relapse as he reports just having gotten out of residential treatment, and minimal periods of sobriety recently. Patient therefore appears to currently be at an increased risk of relapse  Specific goals from treatment plan addressed this week: The patient goal is to identify and implement coping skills. The patient has not endorsed any relapses, urges or triggers over the last week. He reports he has been able to effectively utilize his coping skills.   Effectiveness of strategies: Strategies appear to be effective.     Dimension #6 - Recovery Environment - Risk 3. Patient is currently unemployed, and so appears to lack structure for his daily life. He was able to identify hobbies and activities he enjoys, but it did not appear that he was actively engaging in them prior to going to treatment. Patient reports that he is currently living at a sober house, and so has some support from his living environment. Patient reports that his family is supportive of his sobriety, and about half of his friends are as well. Some of the patient's friends could increase his risk of relapse as they drank together historically. Patient endorses pending legal consequences from a DWI that happened in August 2018. He denies any other current legal involvement.  Specific goals from treatment plan addressed this week: The patient goal is to develop sober structured activities.The patient has been looking into volunteer work over the last week. He is very interested in giving back.   Effectiveness of strategies: Strategies appear to be effective    T) Treatment plan updated no.  Patient notified and in agreement NA.  Patient educated on Relapse Prevention Patient has completed 28 of 108 program hours at this time. Projected  discharge date is 10/30/19. Current discharge plan is Phase III.     Mayte BELEM [facility]a [facility]  7/28/2019, 7:58 AM          Psycho-Educational Curriculum  Date Attended  Psycho-Educational Curriculum  Date Attended    8 Dimensions of wellness   Grief and Loss     Emotional   Stages of grief    Physical   Memorialized     Intellectual   Letters to loved ones     Occupational   Grief group    Spiritual   Loneliness    Environmental   Purpose and Hobbies    Financial   Values    Social   Hobbies    Access to Care   Pat      Service Access   Volunteer Work     Pain Management   Experiential Learning     Memory   Value of movement     Physical Wellness  Relationships     Medication   Self-Love     PAWS   Resentment    Hygiene (Sleep, Physical, etc)   Communication Skills     Emotional Wellbeing   Amends     Healthy vs. Unhealthy Feelings  Family     Affirmations  Social Anxieties     Co-Occurring Disorders  Legacy     Anxiety   Support(+ & -)    Depression  Assertive Communication     Grounding  Codependency    Trauma/Victim Identity   Boundaries    MH/CD Acceptance   Defense Mechanisms     Sober Structure  7/5-7/12 Relapse Prevention  7/15-7/19   Sober support groups   Triggers and High Risk Situations    Needds  Relapse Prevention Plan     Spirituality   Coping Skills     Schedule   Addictive Thoughts    Sobriety Vs. Recovery   Relapse Process     Power of Now   What is Addiction     Truth in life   Impulsive/Compulsive Behaviors     Recovery Investement   Cross Addiction     Recovery Influences   Early Recovery     Educational Videos   Mindfulness    No Kidding Me 2!       Anonymous People       Ike's Story       Pleasure Unwoven

## 2021-05-30 NOTE — PROGRESS NOTES
Willie Dougherty attended 3 hours of group on 7/10/2019.     The group topic was Sober Structure, patient was responsive to topic.     Patients engagement in the group session: medium     Total number of patients present 8.     Supervising MD: Dr. Sai Olea, [facility], 7/10/2019, 3:41 PM

## 2021-05-31 NOTE — TELEPHONE ENCOUNTER
Call placed to [facility], and the 30 day supply of medication for Bupropion and Omeprazole were discontinued, seeing the 90 day supplies were ordered.

## 2021-05-31 NOTE — PROGRESS NOTES
Weekly Progress Note  Willie Dougherty  1960  [id number]      D) Pt attended 2 groups this week with 0 absences. Patient attended 0 individual sessions this week. Patient is in phase II of SRP.  A) Staff facilitated groups and reviewed tx progress. Assessed for [facility]. R) No VAP needed at this time.   Any significant events, defines as events that impact patients relationship with others inside and outside of treatment: The patient has stepped down from phase I to phase II of the senior recovery program.   Indicate any changes or monitoring of physical or mental health problems: No emergent concerns     Indicate involvement by any outside supports: Sober living   IAPP reviewed and modified as needed. NA  Patient was staffed with Dr. Gibbs and Dahlia Penn [facility] on 8/15/19.   Pt working on the following dimensions:  Dimension #1 - Withdrawal Potential - Risk 0. Patient reports a history of regular alcohol use. He states that he was recently discharged from residential treatment at [facility]. Patient denies any alcohol use since being out of treatment. He reports his last use of alcohol was in January 2019. Patient denies any current or past withdrawal symptoms. He shows no physical signs or symptoms of intoxication or withdrawal. Patient is oriented x4  Specific goals from treatment plan addressed this week:   1.The patient goal is to maintain sobriety .    Effectiveness of strategies: Strategies appear to be effective. The patient continues to report abstinence.     Dimension #2 - Biomedical - Risk 0. Patient denies any current health issues or concerns. He does not currently have a PCP, but appears able to get his medical needs met and addressed as necessary. Patient denies any immediate medical needs or concerns. Patient appears to be in adequate physical health and functioning at this time  Specific goals from treatment plan addressed this week:   1. The patient goal is to maintain good physical  health  Effectiveness of strategies: Strategies appear to be effective. There are no new or emergent biomedical concerns at this time. The patient is in stable health.     Dimension #3 - Emotional/Behavioral/Cognitive - Risk 1. Patient reports mental health diagnoses of depression and bipolar disorder. Patient does not currently have any mental health services as he was recently discharged from residential treatment. He does endorse that he will be seeking psychiatry services. Patient endorses medication to help manage his mental health symptoms. Patient denies any suicidal or homicidal thoughts or plans. Patient is oriented x4. .  Specific goals from treatment plan addressed this week:   1The patient goal is to manage mental health.  2. Remain medication compliant.    Effectiveness of strategies: Strategies appear to be effective. The patient reports stable mental health and medication compliance. He stated that he feels satisfied and optimistic about his recovery.     Dimension #4 - Treatment Acceptance/Resistance - Risk 1. Patient reports that he does feel that he is required to be here for treatment, and he would have liked a less intensive program. Patient does verbalize a desire to change his substance use at this time. Patient does appear ambivalent about his need for continued treatment at this time. He does verbalize willingness to follow treatment recommendations at this time. Patient was calm, cooperative, and appropriately behaved throughout this appointment  Specific goals from treatment plan addressed this week:   1.The patient goal is to follow through with intentions to treat chemical dependency concerns.   Effectiveness of strategies: Strategies appear to be effective. The patient is in phase II of SRP. He has followed all treatment expectations and is meeting set goals.     Dimension #5 - Relapse Potential - Risk 3. Patient reports that he just completed his first treatment. He therefore appears to  have some knowledge of addiction, but could benefit from additional education. Patient appears to lack some insight into his use and the impact it had on his life as he appears to minimize his need for treatment and change despite the negative consequences he has experienced. Patient appears to lack the necessary coping skills at this time to help him prevent relapse as he reports just having gotten out of residential treatment, and minimal periods of sobriety recently. Patient therefore appears to currently be at an increased risk of relapse  Specific goals from treatment plan addressed this week:   1. The patient goal is to identify and implement coping skills.   Effectiveness of strategies: Strategies appear to be effective. No relapses, urges or triggers reported.     Dimension #6 - Recovery Environment - Risk 3. Patient is currently unemployed, and so appears to lack structure for his daily life. He was able to identify hobbies and activities he enjoys, but it did not appear that he was actively engaging in them prior to going to treatment. Patient reports that he is currently living at a sober house, and so has some support from his living environment. Patient reports that his family is supportive of his sobriety, and about half of his friends are as well. Some of the patient's friends could increase his risk of relapse as they drank together historically. Patient endorses pending legal consequences from a DWI that happened in August 2018. He denies any other current legal involvement.  Specific goals from treatment plan addressed this week:   1.The patient goal is to develop sober structured activities.  Effectiveness of strategies: Strategies appear to be effective. The patient is attending sober support groups as a requirement of his sober house. He is reporting regular contact with sober supportive peers. No changes.     T) Treatment plan updated no.  Patient notified and in agreement NA.  Patient educated  on Emotional Wellbeing Patient has completed 58 of 108 program hours at this time. Projected discharge date is 10/30/19. Current discharge plan is Phase III.     ROSARIO Baird  8/22/2019, 11:26 AM          Psycho-Educational Curriculum  Date Attended  Psycho-Educational Curriculum  Date Attended    8 Dimensions of wellness  8/5-8/9 Grief and Loss  7/29-8/2   Emotional   Stages of grief    Physical   Memorialized     Intellectual   Letters to loved ones     Occupational   Grief group    Spiritual   Loneliness    Environmental   Purpose and Hobbies    Financial   Values    Social   Hobbies    Access to Care  8/12-8/16 Pat      Service Access   Volunteer Work     Pain Management   Experiential Learning     Memory   Value of movement     Physical Wellness  Relationships     Medication   Self-Love     PAWS   Resentment    Hygiene (Sleep, Physical, etc)   Communication Skills     Emotional Wellbeing  8/19-8/23 Amends     Healthy vs. Unhealthy Feelings  Family     Affirmations  Social Anxieties     Co-Occurring Disorders  Legacy     Anxiety   Support(+ & -)    Depression  Assertive Communication     Grounding  Codependency    Trauma/Victim Identity   Boundaries    MH/CD Acceptance   Defense Mechanisms     Sober Structure  7/5-7/12 Relapse Prevention  7/15-7/19   Sober support groups   Triggers and High Risk Situations    Needds  Relapse Prevention Plan     Spirituality   Coping Skills     Schedule   Addictive Thoughts    Sobriety Vs. Recovery   Relapse Process     Power of Now   What is Addiction     Truth in life   Impulsive/Compulsive Behaviors     Recovery Investement   Cross Addiction     Recovery Influences   Early Recovery     Educational Videos   Mindfulness    No Kidding Me 2!       Anonymous People       Ike's Story       Pleasure Unwoven

## 2021-05-31 NOTE — PROGRESS NOTES
Willie Dougherty attended 3 hours of group on 8/26/2019.     The group topic was Acceptance, patient was responsive to topic.     Patients engagement in the group session: medium     Total number of patients present 11.     Supervising MD: Dr. Sai Olea, [facility]  8/26/2019, 3:10 PM

## 2021-05-31 NOTE — PROGRESS NOTES
Willie Dougherty attended 3 hours of group on 8/23/2019.     The group topic was Emotional Wellbeing, patient was responsive to topic.     Patients engagement in the group session: medium     Total number of patients present 9.     Supervising MD: Dr. Param Olea, [facility]  8/23/2019, 2:01 PM

## 2021-05-31 NOTE — PROGRESS NOTES
Willie Dougherty attended 3 hours of group on 8/9/2019.     The group topic was 8 Dimensions of Wellness, patient was responsive to topic.     Patients engagement in the group session: medium     Total number of patients present 7.     Supervising MD: Dr. Param Olea, [facility]  8/9/2019, 12:14 PM

## 2021-05-31 NOTE — PROGRESS NOTES
Willie Dougherty attended 3 hours of group on 8/7/2019.     The group topic was 8 Dimensions of Wellness, patient was responsive to topic.     Patients engagement in the group session: medium     Total number of patients present 7.     Supervising MD: Dr. Sai Olea, [facility]  8/7/2019, 12:23 PM

## 2021-05-31 NOTE — PROGRESS NOTES
Willie Dougherty attended 3 hours of group on 8/30/2019.     The group topic was Acceptance, patient was responsive to topic.     Patients engagement in the group session: medium     Total number of patients present 9.     Supervising MD: Dr. Param Olea, [facility]  8/30/2019, 11:59 AM

## 2021-05-31 NOTE — PROGRESS NOTES
Willie Dougherty attended 3 hours of group on 7/31/2019.     The group topic was Grief and Loss, patient was responsive to topic.     Patients engagement in the group session: medium     Total number of patients present 8.     Supervising MD: Dr. Sai Olea, [facility]  7/31/2019, 1:40 PM

## 2021-05-31 NOTE — TELEPHONE ENCOUNTER
Refill request received for Bupropion  mg, and the refill was refused, as the patient should have enough through 08/22/2019, and he has an appointment with Dr. Gibbs on 08/21/2019, and in case she would like to adjust dosing at all, will hold until tomorrow for refill to occur.  [facility] notified of why it was being refused today.

## 2021-05-31 NOTE — TELEPHONE ENCOUNTER
[facility] requesting a 90 day supply, versus a 30 day supply of medication that was sent to them today, for the Omeprazole and the Bupropion.

## 2021-05-31 NOTE — PROGRESS NOTES
Weekly Progress Note  Willie Dougherty  1960  [id number]      D) Pt attended 2 groups this week with 0 absences. Patient attended 0 individual sessions this week. Patient is in phase II of SRP.  A) Staff facilitated groups and reviewed tx progress. [facility] for [facility]. R) No VAP needed at this time.   Any significant events, defines as events that impact patients relationship with others inside and outside of treatment: The patient has stepped down from phase I to phase II of the senior recovery program.   Indicate any changes or monitoring of physical or mental health problems: No emergent concerns     Indicate involvement by any outside supports: Sober living   IAPP reviewed and modified as needed. NA  Patient was staffed with Dr. Gibbs and Dahlia Penn [facility] on 8/22/19.   Pt working on the following dimensions:  Dimension #1 - Withdrawal Potential - Risk 0. Patient reports a history of regular alcohol use. He states that he was recently discharged from residential treatment at [place]. Patient denies any alcohol use since being out of treatment. He reports his last use of alcohol was in January 2019. Patient denies any current or past withdrawal symptoms. He shows no physical signs or symptoms of intoxication or withdrawal. Patient is oriented x4  Specific goals from treatment plan addressed this week:   1.The patient goal is to maintain sobriety .    Effectiveness of strategies: Strategies appear to be effective. The patient reports no use over the last week. At this time there are no new or emergent concerns. UA results continue to be negative.     Dimension #2 - Biomedical - Risk 0. Patient denies any current health issues or concerns. He does not currently have a PCP, but appears able to get his medical needs met and addressed as necessary. Patient denies any immediate medical needs or concerns. Patient appears to be in adequate physical health and functioning at this time  Specific goals from treatment  plan addressed this week:   1. The patient goal is to maintain good physical health  Effectiveness of strategies: Strategies appear to be effective. The patient is not endorsing any emergent biomedical concerns. He has been able to care for himself and is able to get needs met if concerns arise.     Dimension #3 - Emotional/Behavioral/Cognitive - Risk 1. Patient reports mental health diagnoses of depression and bipolar disorder. Patient does not currently have any mental health services as he was recently discharged from residential treatment. He does endorse that he will be seeking psychiatry services. Patient endorses medication to help manage his mental health symptoms. Patient denies any suicidal or homicidal thoughts or plans. Patient is oriented x4. .  Specific goals from treatment plan addressed this week:   1The patient goal is to manage mental health.  2. Remain medication compliant.    Effectiveness of strategies: Strategies appear to be effective. The patient reports no mental health concerns at this time. He has been medication compliant and continues to meet with Dr. Gibbs as needed. The patient is stable at this time.     Dimension #4 - Treatment Acceptance/Resistance - Risk 1. Patient reports that he does feel that he is required to be here for treatment, and he would have liked a less intensive program. Patient does verbalize a desire to change his substance use at this time. Patient does appear ambivalent about his need for continued treatment at this time. He does verbalize willingness to follow treatment recommendations at this time. Patient was calm, cooperative, and appropriately behaved throughout this appointment  Specific goals from treatment plan addressed this week:   1.The patient goal is to follow through with intentions to treat chemical dependency concerns.   Effectiveness of strategies: Strategies appear to be effective. The patient is in phase II of SRP. He has followed all  treatment expectations and is meeting set goals. The patient is in the action stage of change.     Dimension #5 - Relapse Potential - Risk 3. Patient reports that he just completed his first treatment. He therefore appears to have some knowledge of addiction, but could benefit from additional education. Patient appears to lack some insight into his use and the impact it had on his life as he appears to minimize his need for treatment and change despite the negative consequences he has experienced. Patient appears to lack the necessary coping skills at this time to help him prevent relapse as he reports just having gotten out of residential treatment, and minimal periods of sobriety recently. Patient therefore appears to currently be at an increased risk of relapse  Specific goals from treatment plan addressed this week:   1. The patient goal is to identify and implement coping skills.   Effectiveness of strategies: Strategies appear to be effective. The patient continues to report no relapses, urges or triggers. He report he has been engaging with sober peers and finding it beneficial. He reports that he has high motivation for his recovery at this time.     Dimension #6 - Recovery Environment - Risk 3. Patient is currently unemployed, and so appears to lack structure for his daily life. He was able to identify hobbies and activities he enjoys, but it did not appear that he was actively engaging in them prior to going to treatment. Patient reports that he is currently living at a sober house, and so has some support from his living environment. Patient reports that his family is supportive of his sobriety, and about half of his friends are as well. Some of the patient's friends could increase his risk of relapse as they drank together historically. Patient endorses pending legal consequences from a DWI that happened in August 2018. He denies any other current legal involvement.  Specific goals from treatment plan  addressed this week:   1.The patient goal is to develop sober structured activities.  Effectiveness of strategies: Strategies appear to be effective. The patient is attending sober support groups. He identified that he is starting a sober routine and is finding benefit in that.     T) Treatment plan updated yes.  Patient notified and in agreement No.  Patient educated on Acceptance Patient has completed 64 of 108 program hours at this time. Projected discharge date is 10/30/19. Current discharge plan is Phase III.     RSOARIO Baird  8/29/2019, 10:28 AM          Psycho-Educational Curriculum  Date Attended  Psycho-Educational Curriculum  Date Attended    8 Dimensions of wellness  8/5-8/9 Grief and Loss  7/29-8/2   Emotional   Stages of grief    Physical   Memorialized     Intellectual   Letters to loved ones     Occupational   Grief group    Spiritual   Loneliness    Environmental   Purpose and Hobbies    Financial   Values    Social   Hobbies    Access to Care  8/12-8/16 Pat      Service Access   Volunteer Work     Pain Management   Experiential Learning     Memory   Value of movement     Physical Wellness  Relationships     Medication   Self-Love     PAWS   Resentment    Hygiene (Sleep, Physical, etc)   Communication Skills     Emotional Wellbeing  8/19-8/23 Amends     Healthy vs. Unhealthy Feelings  Family     Affirmations  Social Anxieties     Co-Occurring Disorders  Legacy     Anxiety   Support(+ & -)    Depression  Assertive Communication     Grounding  Codependency    Trauma/Victim Identity   Boundaries    MH/CD Acceptance   Defense Mechanisms     Sober Structure  7/5-7/12 Relapse Prevention  7/15-7/19   Sober support groups   Triggers and High Risk Situations    Needds  Relapse Prevention Plan     Spirituality   Coping Skills     Schedule   Addictive Thoughts    Sobriety Vs. Recovery   Relapse Process     Power of Now   What is Addiction     Truth in life   Impulsive/Compulsive Behaviors      Recovery Investement   Cross Addiction     Recovery Influences   Early Recovery       Acceptance  8/26-8/30   Educational Videos   Mindfulness    No Kidding Me 2!       Anonymous People       Ike's Story       Pleasure Unwoven

## 2021-05-31 NOTE — PROGRESS NOTES
Weekly Progress Note  Willie Dougherty  1960  [id number]      D) Pt attended 3 groups this week with 0 absences. Patient attended 1 individual sessions this week. Patient is in phase I of SRP.  A) Staff facilitated groups and reviewed tx progress. [facility] for [facility]. R) No VAP needed at this time.   Any significant events, defines as events that impact patients relationship with others inside and outside of treatment: None at this time   Indicate any changes or monitoring of physical or mental health problems: No emergent concerns     Indicate involvement by any outside supports: Sober living   IAPP reviewed and modified as needed. NA  Patient was staffed with Dr. Gibbs and Dahlia Penn [facility] on 8/1/19.   Pt working on the following dimensions:  Dimension #1 - Withdrawal Potential - Risk 0. Patient reports a history of regular alcohol use. He states that he was recently discharged from residential treatment at [place]. Patient denies any alcohol use since being out of treatment. He reports his last use of alcohol was in January 2019. Patient denies any current or past withdrawal symptoms. He shows no physical signs or symptoms of intoxication or withdrawal. Patient is oriented x4  Specific goals from treatment plan addressed this week:   1.The patient goal is to maintain sobriety .    Effectiveness of strategies: Strategies appear to be effective, the patient continues to report abstinence and his UA results have been congruent with this.     Dimension #2 - Biomedical - Risk 0. Patient denies any current health issues or concerns. He does not currently have a PCP, but appears able to get his medical needs met and addressed as necessary. Patient denies any immediate medical needs or concerns. Patient appears to be in adequate physical health and functioning at this time  Specific goals from treatment plan addressed this week:   1. The patient goal is to maintain good physical health  Effectiveness of strategies:  Strategies appear to be effective. The patient is not endorsing any emergent biomedical concerns at this time.     Dimension #3 - Emotional/Behavioral/Cognitive - Risk 1. Patient reports mental health diagnoses of depression and bipolar disorder. Patient does not currently have any mental health services as he was recently discharged from residential treatment. He does endorse that he will be seeking psychiatry services. Patient endorses medication to help manage his mental health symptoms. Patient denies any suicidal or homicidal thoughts or plans. Patient is oriented x4. .  Specific goals from treatment plan addressed this week:   1The patient goal is to manage mental health.  2. Remain medication compliant.    Effectiveness of strategies: Strategies appear to be effective. The patient reports no new or emergent mental health concerns. At this time he is medication compliant.     Dimension #4 - Treatment Acceptance/Resistance - Risk 1. Patient reports that he does feel that he is required to be here for treatment, and he would have liked a less intensive program. Patient does verbalize a desire to change his substance use at this time. Patient does appear ambivalent about his need for continued treatment at this time. He does verbalize willingness to follow treatment recommendations at this time. Patient was calm, cooperative, and appropriately behaved throughout this appointment  Specific goals from treatment plan addressed this week:   1.The patient goal is to follow through with intentions to treat chemical dependency concerns.   Effectiveness of strategies: Strategies appear to be effective. The patient is an active and engaged group member. He participates well and is motivated for treatment.     Dimension #5 - Relapse Potential - Risk 3. Patient reports that he just completed his first treatment. He therefore appears to have some knowledge of addiction, but could benefit from additional education. Patient  appears to lack some insight into his use and the impact it had on his life as he appears to minimize his need for treatment and change despite the negative consequences he has experienced. Patient appears to lack the necessary coping skills at this time to help him prevent relapse as he reports just having gotten out of residential treatment, and minimal periods of sobriety recently. Patient therefore appears to currently be at an increased risk of relapse  Specific goals from treatment plan addressed this week:   1. The patient goal is to identify and implement coping skills.   Effectiveness of strategies: Strategies appear to be effective. The patient has been discussing his recovery within the group. He reports no urges or triggers and appears to be able to implement coping skills if necessary.     Dimension #6 - Recovery Environment - Risk 3. Patient is currently unemployed, and so appears to lack structure for his daily life. He was able to identify hobbies and activities he enjoys, but it did not appear that he was actively engaging in them prior to going to treatment. Patient reports that he is currently living at a sober house, and so has some support from his living environment. Patient reports that his family is supportive of his sobriety, and about half of his friends are as well. Some of the patient's friends could increase his risk of relapse as they drank together historically. Patient endorses pending legal consequences from a DWI that happened in August 2018. He denies any other current legal involvement.  Specific goals from treatment plan addressed this week:   1.The patient goal is to develop sober structured activities.  Effectiveness of strategies: Strategies appear to be effective. The patient is engaging in sober support groups and is benefiting from them. He is finding vin in his sober support groups.     T) Treatment plan updated no.  Patient notified and in agreement NA.  Patient educated  on Grief and Loss Patient has completed 37 of 108 program hours at this time. Projected discharge date is 10/30/19. Current discharge plan is Phase III.     ROSARIO Baird  8/1/2019, 3:07 PM          Psycho-Educational Curriculum  Date Attended  Psycho-Educational Curriculum  Date Attended    8 Dimensions of wellness   Grief and Loss  7/29-8/2   Emotional   Stages of grief    Physical   Memorialized     Intellectual   Letters to loved ones     Occupational   Grief group    Spiritual   Loneliness    Environmental   Purpose and Hobbies    Financial   Values    Social   Hobbies    Access to Care   Pat      Service Access   Volunteer Work     Pain Management   Experiential Learning     Memory   Value of movement     Physical Wellness  Relationships     Medication   Self-Love     PAWS   Resentment    Hygiene (Sleep, Physical, etc)   Communication Skills     Emotional Wellbeing   Amends     Healthy vs. Unhealthy Feelings  Family     Affirmations  Social Anxieties     Co-Occurring Disorders  Legacy     Anxiety   Support(+ & -)    Depression  Assertive Communication     Grounding  Codependency    Trauma/Victim Identity   Boundaries    MH/CD Acceptance   Defense Mechanisms     Sober Structure  7/5-7/12 Relapse Prevention  7/15-7/19   Sober support groups   Triggers and High Risk Situations    Needds  Relapse Prevention Plan     Spirituality   Coping Skills     Schedule   Addictive Thoughts    Sobriety Vs. Recovery   Relapse Process     Power of Now   What is Addiction     Truth in life   Impulsive/Compulsive Behaviors     Recovery Investement   Cross Addiction     Recovery Influences   Early Recovery     Educational Videos   Mindfulness    No Kidding Me 2!       Anonymous People       Ike's Story       Pleasure Unwoven

## 2021-05-31 NOTE — PROGRESS NOTES
Correct pharmacy verified with patient and confirmed in snapshot? [x] yes []no    Charge captured ? [x] yes  [] no    Medications Phoned  to Pharmacy [] yes [x]no  Name of Pharmacist:  List Medications, including dose, quantity and instructions      Medication Prescriptions given to patient   [] yes  [x] no   List the name of the drug the prescription was written for.       Medications ordered this visit were e-scribed.  Verified by order class [x] yes  [] no wellbutrin 300mg, vistaril 50mg, prilosec 20mg    Medication changes or discontinuations were communicated to patient's pharmacy: [x] yes  [] no wellbutrin 150 discn    UA collected [x] yes  [] no    [place] Prescription Monitoring Program Reviewed? [x] yes  [] no    Referrals were made to:  Yes, psychology    Future appointment was made: [x] yes  [] no    Dictation completed at time of chart check: [x] yes  [] no    I have checked the documentation for today s encounters and the above information has been reviewed and completed.

## 2021-05-31 NOTE — PROGRESS NOTES
Willie Dougherty attended 3 hours of group on 8/19/2019.     The group topic was Emotional Wellbeing, patient was responsive to topic.     Patients engagement in the group session: medium     Total number of patients present 9.     Supervising MD: Dr. Sai Olea, [facility]  8/19/2019, 12:26 PM

## 2021-05-31 NOTE — PROGRESS NOTES
Willie Dougherty attended 3 hours of group on 8/14/2019.     The group topic was 8 Dimensions of Wellness, patient was responsive to topic.     Patients engagement in the group session: high     Total number of patients present 8.     Supervising MD: Dr. Sai Olea, [facility]  8/14/2019, 11:10 AM

## 2021-05-31 NOTE — PROGRESS NOTES
Weekly Progress Note  Willie Dougherty  1960  [id number]      D) Pt attended 2 groups this week with 0 absences. Patient attended 1 individual sessions this week. Patient is in phase I of SRP.  A) Staff facilitated groups and reviewed tx progress. Assessed for [facility]. R) No VAP needed at this time.   Any significant events, defines as events that impact patients relationship with others inside and outside of treatment: The patient missed group on 8/5/19 due to a court date, this did get continued so he will need to present again.   Indicate any changes or monitoring of physical or mental health problems: No emergent concerns     Indicate involvement by any outside supports: Sober living   IAPP reviewed and modified as needed. NA  Patient was staffed with Dr. Gibbs and [facility]ahl[facility] on 8/1/19.   Pt working on the following dimensions:  Dimension #1 - Withdrawal Potential - Risk 0. Patient reports a history of regular alcohol use. He states that he was recently discharged from residential treatment at [place]. Patient denies any alcohol use since being out of treatment. He reports his last use of alcohol was in January 2019. Patient denies any current or past withdrawal symptoms. He shows no physical signs or symptoms of intoxication or withdrawal. Patient is oriented x4  Specific goals from treatment plan addressed this week:   1.The patient goal is to maintain sobriety .    Effectiveness of strategies: Strategies appear to be effective, no use reported over the last week.     Dimension #2 - Biomedical - Risk 0. Patient denies any current health issues or concerns. He does not currently have a PCP, but appears able to get his medical needs met and addressed as necessary. Patient denies any immediate medical needs or concerns. Patient appears to be in adequate physical health and functioning at this time  Specific goals from treatment plan addressed this week:   1. The patient goal is to maintain good  physical health  Effectiveness of strategies: Strategies appear to be effective. No new or emergent biomedical concerns.     Dimension #3 - Emotional/Behavioral/Cognitive - Risk 1. Patient reports mental health diagnoses of depression and bipolar disorder. Patient does not currently have any mental health services as he was recently discharged from residential treatment. He does endorse that he will be seeking psychiatry services. Patient endorses medication to help manage his mental health symptoms. Patient denies any suicidal or homicidal thoughts or plans. Patient is oriented x4. .  Specific goals from treatment plan addressed this week:   1The patient goal is to manage mental health.  2. Remain medication compliant.    Effectiveness of strategies: Strategies appear to be effective. At this time the patient is reporting stable mental health, he is reporting medication compliance.     Dimension #4 - Treatment Acceptance/Resistance - Risk 1. Patient reports that he does feel that he is required to be here for treatment, and he would have liked a less intensive program. Patient does verbalize a desire to change his substance use at this time. Patient does appear ambivalent about his need for continued treatment at this time. He does verbalize willingness to follow treatment recommendations at this time. Patient was calm, cooperative, and appropriately behaved throughout this appointment  Specific goals from treatment plan addressed this week:   1.The patient goal is to follow through with intentions to treat chemical dependency concerns.   Effectiveness of strategies: Strategies appear to be effective. The patient is in the action stage of change. He is an active and engaged group member. Should he remain compliant with the expectations of the group he will step down to phase II next week.     Dimension #5 - Relapse Potential - Risk 3. Patient reports that he just completed his first treatment. He therefore appears  to have some knowledge of addiction, but could benefit from additional education. Patient appears to lack some insight into his use and the impact it had on his life as he appears to minimize his need for treatment and change despite the negative consequences he has experienced. Patient appears to lack the necessary coping skills at this time to help him prevent relapse as he reports just having gotten out of residential treatment, and minimal periods of sobriety recently. Patient therefore appears to currently be at an increased risk of relapse  Specific goals from treatment plan addressed this week:   1. The patient goal is to identify and implement coping skills.   Effectiveness of strategies: Strategies appear to be effective. The patient has not endorsed any urges, relapses, or triggers over the last week. He appears to be utilizing his coping skills effectively.     Dimension #6 - Recovery Environment - Risk 3. Patient is currently unemployed, and so appears to lack structure for his daily life. He was able to identify hobbies and activities he enjoys, but it did not appear that he was actively engaging in them prior to going to treatment. Patient reports that he is currently living at a sober house, and so has some support from his living environment. Patient reports that his family is supportive of his sobriety, and about half of his friends are as well. Some of the patient's friends could increase his risk of relapse as they drank together historically. Patient endorses pending legal consequences from a DWI that happened in August 2018. He denies any other current legal involvement.  Specific goals from treatment plan addressed this week:   1.The patient goal is to develop sober structured activities.  Effectiveness of strategies: Strategies appear to be effective. The patient reports support engagement. He reports he has been engaging with sober peers on the weekend and finding it beneficial.     T)  Treatment plan updated no.  Patient notified and in agreement NA.  Patient educated on 8 Dimensions of Wellness Patient has completed 43 of 108 program hours at this time. Projected discharge date is 10/30/19. Current discharge plan is Phase III.     ROSARIO Baird  8/8/2019, 11:47 AM          Psycho-Educational Curriculum  Date Attended  Psycho-Educational Curriculum  Date Attended    8 Dimensions of wellness  8/5-8/9 Grief and Loss  7/29-8/2   Emotional   Stages of grief    Physical   Memorialized     Intellectual   Letters to loved ones     Occupational   Grief group    Spiritual   Loneliness    Environmental   Purpose and Hobbies    Financial   Values    Social   Hobbies    Access to Care   Pat      Service Access   Volunteer Work     Pain Management   Experiential Learning     Memory   Value of movement     Physical Wellness  Relationships     Medication   Self-Love     PAWS   Resentment    Hygiene (Sleep, Physical, etc)   Communication Skills     Emotional Wellbeing   Amends     Healthy vs. Unhealthy Feelings  Family     Affirmations  Social Anxieties     Co-Occurring Disorders  Legacy     Anxiety   Support(+ & -)    Depression  Assertive Communication     Grounding  Codependency    Trauma/Victim Identity   Boundaries    MH/CD Acceptance   Defense Mechanisms     Sober Structure  7/5-7/12 Relapse Prevention  7/15-7/19   Sober support groups   Triggers and High Risk Situations    Needds  Relapse Prevention Plan     Spirituality   Coping Skills     Schedule   Addictive Thoughts    Sobriety Vs. Recovery   Relapse Process     Power of Now   What is Addiction     Truth in life   Impulsive/Compulsive Behaviors     Recovery Investement   Cross Addiction     Recovery Influences   Early Recovery     Educational Videos   Mindfulness    No Kidding Me 2!       Anonymous People       Ike's Story       Pleasure Unwoven

## 2021-05-31 NOTE — PROGRESS NOTES
Willie Dougherty attended 3 hours of group on 8/12/2019.     The group topic was Access to Care, patient was responsive to topic.     Patients engagement in the group session: medium     Total number of patients present 8.     Supervising MD: Dr. Sai Olea, [facility]  8/12/2019, 2:18 PM

## 2021-05-31 NOTE — PROGRESS NOTES
Willie Dougherty attended 3 hours of group on 8/2/2019.     The group topic was Grief and Loss, patient was responsive to topic.     Patients engagement in the group session: medium     Total number of patients present 8 .     Supervising MD: Dr. Param Olea, [facility]  8/2/2019, 1:51 PM

## 2021-05-31 NOTE — PROGRESS NOTES
Weekly Progress Note  Willie Dougherty  1960  [id number]      D) Pt attended 3 groups this week with 0 absences. Patient attended 1 individual sessions this week. Patient is in phase I of SRP.  A) Staff facilitated groups and reviewed tx progress. Assessed for [facility]. R) No VAP needed at this time.   Any significant events, defines as events that impact patients relationship with others inside and outside of treatment: No significant events at this time.   Indicate any changes or monitoring of physical or mental health problems: No emergent concerns     Indicate involvement by any outside supports: Sober living   IAPP reviewed and modified as needed. NA  Patient was staffed with Dr. Gibbs and [facility]ahlia [facility] on 8/8/19.   Pt working on the following dimensions:  Dimension #1 - Withdrawal Potential - Risk 0. Patient reports a history of regular alcohol use. He states that he was recently discharged from residential treatment at [place]. Patient denies any alcohol use since being out of treatment. He reports his last use of alcohol was in January 2019. Patient denies any current or past withdrawal symptoms. He shows no physical signs or symptoms of intoxication or withdrawal. Patient is oriented x4  Specific goals from treatment plan addressed this week:   1.The patient goal is to maintain sobriety .    Effectiveness of strategies: Strategies appear to be effective, the patient continues to report maintained abstinence.     Dimension #2 - Biomedical - Risk 0. Patient denies any current health issues or concerns. He does not currently have a PCP, but appears able to get his medical needs met and addressed as necessary. Patient denies any immediate medical needs or concerns. Patient appears to be in adequate physical health and functioning at this time  Specific goals from treatment plan addressed this week:   1. The patient goal is to maintain good physical health  Effectiveness of strategies: Strategies appear  to be effective. The patient reports no new or emergent biomedical concerns at this time.     Dimension #3 - Emotional/Behavioral/Cognitive - Risk 1. Patient reports mental health diagnoses of depression and bipolar disorder. Patient does not currently have any mental health services as he was recently discharged from residential treatment. He does endorse that he will be seeking psychiatry services. Patient endorses medication to help manage his mental health symptoms. Patient denies any suicidal or homicidal thoughts or plans. Patient is oriented x4. .  Specific goals from treatment plan addressed this week:   1The patient goal is to manage mental health.  2. Remain medication compliant.    Effectiveness of strategies: Strategies appear to be effective. The patient reports stable mental health at this time. He is not endorsing any new or emergent mental health concerns.     Dimension #4 - Treatment Acceptance/Resistance - Risk 1. Patient reports that he does feel that he is required to be here for treatment, and he would have liked a less intensive program. Patient does verbalize a desire to change his substance use at this time. Patient does appear ambivalent about his need for continued treatment at this time. He does verbalize willingness to follow treatment recommendations at this time. Patient was calm, cooperative, and appropriately behaved throughout this appointment  Specific goals from treatment plan addressed this week:   1.The patient goal is to follow through with intentions to treat chemical dependency concerns.   Effectiveness of strategies: Strategies appear to be effective. The patient is an active and engaged group member. He is in the action stage of change at this time. He will be stepping down to phase II at the end of this week.      Dimension #5 - Relapse Potential - Risk 3. Patient reports that he just completed his first treatment. He therefore appears to have some knowledge of addiction,  but could benefit from additional education. Patient appears to lack some insight into his use and the impact it had on his life as he appears to minimize his need for treatment and change despite the negative consequences he has experienced. Patient appears to lack the necessary coping skills at this time to help him prevent relapse as he reports just having gotten out of residential treatment, and minimal periods of sobriety recently. Patient therefore appears to currently be at an increased risk of relapse  Specific goals from treatment plan addressed this week:   1. The patient goal is to identify and implement coping skills.   Effectiveness of strategies: Strategies appear to be effective. No relapse, urges or triggers at this time. Patient is implementing coping skills and finds them to be effective.     Dimension #6 - Recovery Environment - Risk 3. Patient is currently unemployed, and so appears to lack structure for his daily life. He was able to identify hobbies and activities he enjoys, but it did not appear that he was actively engaging in them prior to going to treatment. Patient reports that he is currently living at a sober house, and so has some support from his living environment. Patient reports that his family is supportive of his sobriety, and about half of his friends are as well. Some of the patient's friends could increase his risk of relapse as they drank together historically. Patient endorses pending legal consequences from a DWI that happened in August 2018. He denies any other current legal involvement.  Specific goals from treatment plan addressed this week:   1.The patient goal is to develop sober structured activities.  Effectiveness of strategies: Strategies appear to be effective. The patient is attending sober support groups as a requirement of his sober house. He is reporting regular contact with sober supportive peers.     T) Treatment plan updated no.  Patient notified and in  agreement NA.  Patient educated on Access to Care Patient has completed 52 of 108 program hours at this time. Projected discharge date is 10/30/19. Current discharge plan is Phase III.     ROSARIO Baird  8/15/2019, 12:16 PM          Psycho-Educational Curriculum  Date Attended  Psycho-Educational Curriculum  Date Attended    8 Dimensions of wellness  8/5-8/9 Grief and Loss  7/29-8/2   Emotional   Stages of grief    Physical   Memorialized     Intellectual   Letters to loved ones     Occupational   Grief group    Spiritual   Loneliness    Environmental   Purpose and Hobbies    Financial   Values    Social   Hobbies    Access to Care  8/12-8/16 Pat      Service Access   Volunteer Work     Pain Management   Experiential Learning     Memory   Value of movement     Physical Wellness  Relationships     Medication   Self-Love     PAWS   Resentment    Hygiene (Sleep, Physical, etc)   Communication Skills     Emotional Wellbeing   Amends     Healthy vs. Unhealthy Feelings  Family     Affirmations  Social Anxieties     Co-Occurring Disorders  Legacy     Anxiety   Support(+ & -)    Depression  Assertive Communication     Grounding  Codependency    Trauma/Victim Identity   Boundaries    MH/CD Acceptance   Defense Mechanisms     Sober Structure  7/5-7/12 Relapse Prevention  7/15-7/19   Sober support groups   Triggers and High Risk Situations    Needds  Relapse Prevention Plan     Spirituality   Coping Skills     Schedule   Addictive Thoughts    Sobriety Vs. Recovery   Relapse Process     Power of Now   What is Addiction     Truth in life   Impulsive/Compulsive Behaviors     Recovery Investement   Cross Addiction     Recovery Influences   Early Recovery     Educational Videos   Mindfulness    No Kidding Me 2!       Anonymous People       Ike's Story       Pleasure Unwoven

## 2021-05-31 NOTE — PROGRESS NOTES
Willie Dougherty attended 3 hours of group on 8/16/2019.     The group topic was Access to Care, patient was responsive to topic.     Patients engagement in the group session: medium     Total number of patients present 11.     Supervising MD: Dr. Sai Olea, [facility] [place]  8/16/2019, 2:55 PM

## 2021-05-31 NOTE — TELEPHONE ENCOUNTER
Refill request received for Omeprazole 20 mg, and the refill was refused, as the patient should have enough through 08/22/2019, and he has an appointment with Dr. Gibbs on 08/21/2019, and in case she would like to adjust dosing at all, will hold until tomorrow for refill to occur.  [facility] notified of why it was being refused today.

## 2021-06-01 NOTE — PROGRESS NOTES
Willie Dougherty attended 3 hours of group on 9/9/2019.     The group topic was Relationships, patient was responsive to topic.     Patients engagement in the group session: medium     Total number of patients present 9.     Supervising MD: [facility] Sai Olea, [facility]  9/9/2019, 2:38 PM

## 2021-06-01 NOTE — PROGRESS NOTES
Willie Dougherty attended 3 hours of group on 9/23/2019.     The group topic was Relapse Prevention, patient was responsive to topic.     Patients engagement in the group session: medium     Total number of patients present 12.     Supervising MD: Dr. Sai Olea, [facility]  9/23/2019, 2:00 PM

## 2021-06-01 NOTE — PROGRESS NOTES
Weekly Progress Note  Willie Dougherty  1960  [id number]      D) Pt attended 2 groups this week with 0 absences. Patient attended 0 individual sessions this week. Patient is in phase II of SRP.  A) Staff facilitated groups and reviewed tx progress. [facility] for [facility]. R) No VAP needed at this time.   Any significant events, defines as events that impact patients relationship with others inside and outside of treatment: The patient has stepped down from phase I to phase II of the senior recovery program. He will begin phase III next week.   Indicate any changes or monitoring of physical or mental health problems: No emergent concerns     Indicate involvement by any outside supports: The patient identifies weekly that he engages with sober peers and enjoys doing various activities with his support network.   IAPP reviewed and modified as needed. NA  Patient was staffed with Dr. Gibbs and Dahlia Penn [facility] on 9/19/19.   Pt working on the following dimensions:  Dimension #1 - Withdrawal Potential - Risk 0. Patient reports a history of regular alcohol use. He states that he was recently discharged from residential treatment at [facility]. Patient denies any alcohol use since being out of treatment. He reports his last use of alcohol was in January 2019. Patient denies any current or past withdrawal symptoms. He shows no physical signs or symptoms of intoxication or withdrawal. Patient is oriented x4  Specific goals from treatment plan addressed this week:   1.The patient goal is to maintain sobriety .    Effectiveness of strategies: Strategies appear to be effective. Patient continues to report maintained abstinence.     Dimension #2 - Biomedical - Risk 0. Patient denies any current health issues or concerns. He does not currently have a PCP, but appears able to get his medical needs met and addressed as necessary. Patient denies any immediate medical needs or concerns. Patient appears to be in adequate physical  health and functioning at this time  Specific goals from treatment plan addressed this week:   1. The patient goal is to maintain good physical health  Effectiveness of strategies: Strategies appear to be effective. No new or emergent biomedical concerns reported.     Dimension #3 - Emotional/Behavioral/Cognitive - Risk 1. Patient reports mental health diagnoses of depression and bipolar disorder. Patient does not currently have any mental health services as he was recently discharged from residential treatment. He does endorse that he will be seeking psychiatry services. Patient endorses medication to help manage his mental health symptoms. Patient denies any suicidal or homicidal thoughts or plans. Patient is oriented x4. .  Specific goals from treatment plan addressed this week:   1The patient goal is to manage mental health.  2. Remain medication compliant.    Effectiveness of strategies: Strategies appear to be effective. Patient reports maintained stable mental health. He reports no new or emergent concerns. The patient reports medication stability at this time.      Dimension #4 - Treatment Acceptance/Resistance - Risk 1. Patient reports that he does feel that he is required to be here for treatment, and he would have liked a less intensive program. Patient does verbalize a desire to change his substance use at this time. Patient does appear ambivalent about his need for continued treatment at this time. He does verbalize willingness to follow treatment recommendations at this time. Patient was calm, cooperative, and appropriately behaved throughout this appointment  Specific goals from treatment plan addressed this week:   1.The patient goal is to follow through with intentions to treat chemical dependency concerns.   Effectiveness of strategies: Strategies appear to be effective. The patient is in phase II of SRP, he will being phase III next week. He has followed all treatment expectations and is meeting  set goals. The patient is in the action stage of change. Patient continues to engage in the group setting, over the last week he has begun to open up a bit more and share in group.     Dimension #5 - Relapse Potential - Risk 3. Patient reports that he just completed his first treatment. He therefore appears to have some knowledge of addiction, but could benefit from additional education. Patient appears to lack some insight into his use and the impact it had on his life as he appears to minimize his need for treatment and change despite the negative consequences he has experienced. Patient appears to lack the necessary coping skills at this time to help him prevent relapse as he reports just having gotten out of residential treatment, and minimal periods of sobriety recently. Patient therefore appears to currently be at an increased risk of relapse  Specific goals from treatment plan addressed this week:   1. The patient goal is to identify and implement coping skills.   Effectiveness of strategies: Strategies appear to be effective. No relapses, urges or triggers.     Dimension #6 - Recovery Environment - Risk 3. Patient is currently unemployed, and so appears to lack structure for his daily life. He was able to identify hobbies and activities he enjoys, but it did not appear that he was actively engaging in them prior to going to treatment. Patient reports that he is currently living at a sober house, and so has some support from his living environment. Patient reports that his family is supportive of his sobriety, and about half of his friends are as well. Some of the patient's friends could increase his risk of relapse as they drank together historically. Patient endorses pending legal consequences from a DWI that happened in August 2018. He denies any other current legal involvement.  Specific goals from treatment plan addressed this week:   1.The patient goal is to develop sober structured  activities.  Effectiveness of strategies: Strategies appear to be effective. The patient and this writer are still working on individualized housing. He is engaged in sober support groups.     T) Treatment plan updated no.  Patient notified and in agreement NA.  Patient educated on Relapse Prevention Patient has completed 85 of 108 program hours at this time. Projected discharge date is 10/30/19. Current discharge plan is Phase III.     Mayte PATRICIA Olea [facility]  9/26/2019, 11:01 AM          Psycho-Educational Curriculum  Date Attended  Psycho-Educational Curriculum  Date Attended    8 Dimensions of wellness  8/5-8/9 Grief and Loss  7/29-8/2   Emotional   Stages of grief    Physical   Memorialized     Intellectual   Letters to loved ones     Occupational   Grief group    Spiritual   Loneliness    Environmental   Purpose and Hobbies    Financial   Values    Social   Hobbies    Access to Care  8/12-8/16 Pat      Service Access   Volunteer Work     Pain Management   Experiential Learning     Memory   Value of movement     Physical Wellness  Relationships  9/4-9/6 9/9-9/13   Medication   Self-Love     PAWS   Resentment    Hygiene (Sleep, Physical, etc)   Communication Skills     Emotional Wellbeing  8/19-8/23 Amends     Healthy vs. Unhealthy Feelings  Family     Affirmations  Social Anxieties     Co-Occurring Disorders  Legacy     Anxiety   Support(+ & -)    Depression  Assertive Communication     Grounding  Codependency    Trauma/Victim Identity   Boundaries    MH/CD Acceptance   Defense Mechanisms     Sober Structure  7/5-7/12 9/16-9/20 Relapse Prevention  7/15-7/19 9/23-9/27   Sober support groups   Triggers and High Risk Situations    Needds  Relapse Prevention Plan     Spirituality   Coping Skills     Schedule   Addictive Thoughts    Sobriety Vs. Recovery   Relapse Process     Power of Now   What is Addiction     Truth in life   Impulsive/Compulsive Behaviors     Recovery Investement   Cross Addiction      Recovery Influences   Early Recovery       Acceptance  8/26-8/30   Educational Videos   Mindfulness    No Kidding Me 2!       Anonymous People       Ike's Story       Pleasure Unwoven

## 2021-06-01 NOTE — PROGRESS NOTES
Weekly Progress Note  Willie Dougherty  1960  [id number]      D) Pt attended 2 groups this week with 0 absences. Patient attended 0 individual sessions this week. Patient is in phase II of SRP.  A) Staff facilitated groups and reviewed tx progress. Assessed for [facility]. R) No VAP needed at this time.   Any significant events, defines as events that impact patients relationship with others inside and outside of treatment: The patient has stepped down from phase I to phase II of the senior recovery program.   Indicate any changes or monitoring of physical or mental health problems: No emergent concerns     Indicate involvement by any outside supports: The patient identifies weekly that he engages with sober peers and enjoys doing various activities with his support network.   IAPP reviewed and modified as needed. NA  Patient was staffed with Dr. Gibbs and Dahlia Penn [facility] on 9/12/19.   Pt working on the following dimensions:  Dimension #1 - Withdrawal Potential - Risk 0. Patient reports a history of regular alcohol use. He states that he was recently discharged from residential treatment at [place]. Patient denies any alcohol use since being out of treatment. He reports his last use of alcohol was in January 2019. Patient denies any current or past withdrawal symptoms. He shows no physical signs or symptoms of intoxication or withdrawal. Patient is oriented x4  Specific goals from treatment plan addressed this week:   1.The patient goal is to maintain sobriety .    Effectiveness of strategies: Strategies appear to be effective. The patient reports no use over the last week.     Dimension #2 - Biomedical - Risk 0. Patient denies any current health issues or concerns. He does not currently have a PCP, but appears able to get his medical needs met and addressed as necessary. Patient denies any immediate medical needs or concerns. Patient appears to be in adequate physical health and functioning at this  time  Specific goals from treatment plan addressed this week:   1. The patient goal is to maintain good physical health  Effectiveness of strategies: Strategies appear to be effective. The patient continues to not endorse any new or worsening biomedical concerns.     Dimension #3 - Emotional/Behavioral/Cognitive - Risk 1. Patient reports mental health diagnoses of depression and bipolar disorder. Patient does not currently have any mental health services as he was recently discharged from residential treatment. He does endorse that he will be seeking psychiatry services. Patient endorses medication to help manage his mental health symptoms. Patient denies any suicidal or homicidal thoughts or plans. Patient is oriented x4. .  Specific goals from treatment plan addressed this week:   1The patient goal is to manage mental health.  2. Remain medication compliant.    Effectiveness of strategies: Strategies appear to be effective. The patient did have a DA with [facility] ACOSTA to confirm diagnoses. He does have a Bipolar diagnoses and is getting therapies for that. At this time the patients mental health appears to be stable. He continues to endorse medication compliance with no reports of new or worsening concerns.      Dimension #4 - Treatment Acceptance/Resistance - Risk 1. Patient reports that he does feel that he is required to be here for treatment, and he would have liked a less intensive program. Patient does verbalize a desire to change his substance use at this time. Patient does appear ambivalent about his need for continued treatment at this time. He does verbalize willingness to follow treatment recommendations at this time. Patient was calm, cooperative, and appropriately behaved throughout this appointment  Specific goals from treatment plan addressed this week:   1.The patient goal is to follow through with intentions to treat chemical dependency concerns.   Effectiveness of strategies: Strategies  appear to be effective. The patient is in phase II of SRP. He has followed all treatment expectations and is meeting set goals. The patient is in the action stage of change. Patient continues to engage in the group setting, over the last week he has begun to open up a bit more and share in group.     Dimension #5 - Relapse Potential - Risk 3. Patient reports that he just completed his first treatment. He therefore appears to have some knowledge of addiction, but could benefit from additional education. Patient appears to lack some insight into his use and the impact it had on his life as he appears to minimize his need for treatment and change despite the negative consequences he has experienced. Patient appears to lack the necessary coping skills at this time to help him prevent relapse as he reports just having gotten out of residential treatment, and minimal periods of sobriety recently. Patient therefore appears to currently be at an increased risk of relapse  Specific goals from treatment plan addressed this week:   1. The patient goal is to identify and implement coping skills.   Effectiveness of strategies: Strategies appear to be effective. The patient reports no urges, relapses, or triggers. He reports coping skill implementation and reports high motivation for his recovery.     Dimension #6 - Recovery Environment - Risk 3. Patient is currently unemployed, and so appears to lack structure for his daily life. He was able to identify hobbies and activities he enjoys, but it did not appear that he was actively engaging in them prior to going to treatment. Patient reports that he is currently living at a sober house, and so has some support from his living environment. Patient reports that his family is supportive of his sobriety, and about half of his friends are as well. Some of the patient's friends could increase his risk of relapse as they drank together historically. Patient endorses pending legal  consequences from a DWI that happened in August 2018. He denies any other current legal involvement.  Specific goals from treatment plan addressed this week:   1.The patient goal is to develop sober structured activities.  Effectiveness of strategies: Strategies appear to be effective. The patient and this writer are working on finding new housing at this time. He is engaged in sober support groups and has a strong sober support network.     T) Treatment plan updated no.  Patient notified and in agreement NA.  Patient educated on Sober Structure Patient has completed 79 of 108 program hours at this time. Projected discharge date is 10/30/19. Current discharge plan is Phase III.     ROSARIO Baird  9/19/2019, 11:04 AM          Psycho-Educational Curriculum  Date Attended  Psycho-Educational Curriculum  Date Attended    8 Dimensions of wellness  8/5-8/9 Grief and Loss  7/29-8/2   Emotional   Stages of grief    Physical   Memorialized     Intellectual   Letters to loved ones     Occupational   Grief group    Spiritual   Loneliness    Environmental   Purpose and Hobbies    Financial   Values    Social   Hobbies    Access to Care  8/12-8/16 Pat      Service Access   Volunteer Work     Pain Management   Experiential Learning     Memory   Value of movement     Physical Wellness  Relationships  9/4-9/6 9/9-9/13   Medication   Self-Love     PAWS   Resentment    Hygiene (Sleep, Physical, etc)   Communication Skills     Emotional Wellbeing  8/19-8/23 Amends     Healthy vs. Unhealthy Feelings  Family     Affirmations  Social Anxieties     Co-Occurring Disorders  Legacy     Anxiety   Support(+ & -)    Depression  Assertive Communication     Grounding  Codependency    Trauma/Victim Identity   Boundaries    MH/CD Acceptance   Defense Mechanisms     Sober Structure  7/5-7/12 9/16-9/20 Relapse Prevention  7/15-7/19   Sober support groups   Triggers and High Risk Situations    Needds  Relapse Prevention Plan      Spirituality   Coping Skills     Schedule   Addictive Thoughts    Sobriety Vs. Recovery   Relapse Process     Power of Now   What is Addiction     Truth in life   Impulsive/Compulsive Behaviors     Recovery Investement   Cross Addiction     Recovery Influences   Early Recovery       Acceptance  8/26-8/30   Educational Videos   Mindfulness    No Kidding Me 2!       Anonymous People       Ike's Story       Pleasure Unwoven

## 2021-06-01 NOTE — PROGRESS NOTES
Willie Dougherty attended 3 hours of group on 9/13/2019.     The group topic was Relationships, patient was responsive to topic.     Patients engagement in the group session: low     Total number of patients present 9.     Supervising MD: Dr. Param Olea, [facility]  9/13/2019, 3:21 PM

## 2021-06-01 NOTE — PROGRESS NOTES
Weekly Progress Note  Willie Dougherty  1960  [id number]      D) Pt attended 1 groups this week with 0 absences. Patient attended 0 individual sessions this week. Patient is in phase III of SRP.  A) Staff facilitated groups and reviewed tx progress. [facility] for [facility]. R) No VAP needed at this time.   Any significant events, defines as events that impact patients relationship with others inside and outside of treatment: The patient has stepped down from phase I to phase II of the senior recovery program. He will begin phase III next week.   Indicate any changes or monitoring of physical or mental health problems: No emergent concerns     Indicate involvement by any outside supports: The patient identifies weekly that he engages with sober peers and enjoys doing various activities with his support network.   IAPP reviewed and modified as needed. NA  Patient was staffed with Dr. Gibbs and Dahlia Penn [facility] on 9/26/19.   Pt working on the following dimensions:  Dimension #1 - Withdrawal Potential - Risk 0. Patient reports a history of regular alcohol use. He states that he was recently discharged from residential treatment at [place]. Patient denies any alcohol use since being out of treatment. He reports his last use of alcohol was in January 2019. Patient denies any current or past withdrawal symptoms. He shows no physical signs or symptoms of intoxication or withdrawal. Patient is oriented x4  Specific goals from treatment plan addressed this week:   1.The patient goal is to maintain sobriety .    Effectiveness of strategies: Strategies appear to be effective. No use reported over the last week.     Dimension #2 - Biomedical - Risk 0. Patient denies any current health issues or concerns. He does not currently have a PCP, but appears able to get his medical needs met and addressed as necessary. Patient denies any immediate medical needs or concerns. Patient appears to be in adequate physical health and  functioning at this time  Specific goals from treatment plan addressed this week:   1. The patient goal is to maintain good physical health  Effectiveness of strategies: Strategies appear to be effective. Patient is not reporting any emergent concerns.     Dimension #3 - Emotional/Behavioral/Cognitive - Risk 1. Patient reports mental health diagnoses of depression and bipolar disorder. Patient does not currently have any mental health services as he was recently discharged from residential treatment. He does endorse that he will be seeking psychiatry services. Patient endorses medication to help manage his mental health symptoms. Patient denies any suicidal or homicidal thoughts or plans. Patient is oriented x4. .  Specific goals from treatment plan addressed this week:   1The patient goal is to manage mental health.  2. Remain medication compliant.    Effectiveness of strategies: Strategies appear to be effective. Patient reports maintained stable mental health. He reports no new or emergent concerns. The patient reports medication stability at this time.  No change.     Dimension #4 - Treatment Acceptance/Resistance - Risk 1. Patient reports that he does feel that he is required to be here for treatment, and he would have liked a less intensive program. Patient does verbalize a desire to change his substance use at this time. Patient does appear ambivalent about his need for continued treatment at this time. He does verbalize willingness to follow treatment recommendations at this time. Patient was calm, cooperative, and appropriately behaved throughout this appointment  Specific goals from treatment plan addressed this week:   1.The patient goal is to follow through with intentions to treat chemical dependency concerns.   Effectiveness of strategies: Strategies appear to be effective. Patient is now in phase III of the SRP. He was an active and engaged group member. In action stage of change.     Dimension #5 -  Relapse Potential - Risk 3. Patient reports that he just completed his first treatment. He therefore appears to have some knowledge of addiction, but could benefit from additional education. Patient appears to lack some insight into his use and the impact it had on his life as he appears to minimize his need for treatment and change despite the negative consequences he has experienced. Patient appears to lack the necessary coping skills at this time to help him prevent relapse as he reports just having gotten out of residential treatment, and minimal periods of sobriety recently. Patient therefore appears to currently be at an increased risk of relapse  Specific goals from treatment plan addressed this week:   1. The patient goal is to identify and implement coping skills.   Effectiveness of strategies: Strategies appear to be effective. No relapses, urges or triggers. Identifies how and when he is applying coping skills.     Dimension #6 - Recovery Environment - Risk 3. Patient is currently unemployed, and so appears to lack structure for his daily life. He was able to identify hobbies and activities he enjoys, but it did not appear that he was actively engaging in them prior to going to treatment. Patient reports that he is currently living at a sober house, and so has some support from his living environment. Patient reports that his family is supportive of his sobriety, and about half of his friends are as well. Some of the patient's friends could increase his risk of relapse as they drank together historically. Patient endorses pending legal consequences from a DWI that happened in August 2018. He denies any other current legal involvement.  Specific goals from treatment plan addressed this week:   1.The patient goal is to develop sober structured activities.  Effectiveness of strategies: Strategies appear to be effective. The patient and this writer are still working on individualized housing. He is engaged in  sober support groups. No new concerns.     T) Treatment plan updated no.  Patient notified and in agreement NA.  Patient educated on stress Patient has completed 85 phase I & II program hours. Currently has 2/24 phase III hours . Projected discharge date is 10/30/19. Current discharge plan is Phase III.     [facility]te [facility]a [facility]  10/1/2019, 3:07 PM          Psycho-Educational Curriculum  Date Attended  Psycho-Educational Curriculum  Date Attended    8 Dimensions of wellness  8/5-8/9 Grief and Loss  7/29-8/2   Emotional   Stages of grief    Physical   Memorialized     Intellectual   Letters to loved ones     Occupational   Grief group    Spiritual   Loneliness    Environmental   Purpose and Hobbies    Financial   Values    Social   Hobbies    Access to Care  8/12-8/16 Pat      Service Access   Volunteer Work     Pain Management   Experiential Learning     Memory   Value of movement     Physical Wellness  Relationships  9/4-9/6 9/9-9/13   Medication   Self-Love     PAWS   Resentment    Hygiene (Sleep, Physical, etc)   Communication Skills     Emotional Wellbeing  8/19-8/23 Amends     Healthy vs. Unhealthy Feelings  Family     Affirmations  Social Anxieties     Co-Occurring Disorders  Legacy     Anxiety   Support(+ & -)    Depression  Assertive Communication     Grounding  Codependency    Trauma/Victim Identity   Boundaries    MH/CD Acceptance   Defense Mechanisms     Sober Structure  7/5-7/12 9/16-9/20 Relapse Prevention  7/15-7/19 9/23-9/27   Sober support groups   Triggers and High Risk Situations    Needds  Relapse Prevention Plan     Spirituality   Coping Skills     Schedule   Addictive Thoughts    Sobriety Vs. Recovery   Relapse Process     Power of Now   What is Addiction     Truth in life   Impulsive/Compulsive Behaviors     Recovery Investement   Cross Addiction     Recovery Influences   Early Recovery       Acceptance  8/26-8/30   Educational Videos   Mindfulness    No Kidding Me 2!       Anonymous  People       Ike's Story       Pleasure Unwoven

## 2021-06-01 NOTE — PROGRESS NOTES
Willie Dougherty attended 3 hours of group on 9/27/2019.     The group topic was Relapse Prevention, patient was responsive to topic.     Patients engagement in the group session: low     Total number of patients present 11.     Supervising MD: Dr. Param Olea, [facility]  9/27/2019, 2:31 PM

## 2021-06-01 NOTE — PROGRESS NOTES
Weekly Progress Note  Willie Dougherty  1960  [id number]      D) Pt attended 2 groups this week with 0 absences. Patient attended 0 individual sessions this week. Patient is in phase II of SRP.  A) Staff facilitated groups and reviewed tx progress. [facility] for [facility]. R) No VAP needed at this time.   Any significant events, defines as events that impact patients relationship with others inside and outside of treatment: The patient has stepped down from phase I to phase II of the senior recovery program.   Indicate any changes or monitoring of physical or mental health problems: No emergent concerns     Indicate involvement by any outside supports: Sober living   IAPP reviewed and modified as needed. NA  Patient was staffed with Dr. Gibbs and Dahlia Penn [facility][note 3] on 8/29/19.   Pt working on the following dimensions:  Dimension #1 - Withdrawal Potential - Risk 0. Patient reports a history of regular alcohol use. He states that he was recently discharged from residential treatment at [place]. Patient denies any alcohol use since being out of treatment. He reports his last use of alcohol was in January 2019. Patient denies any current or past withdrawal symptoms. He shows no physical signs or symptoms of intoxication or withdrawal. Patient is oriented x4  Specific goals from treatment plan addressed this week:   1.The patient goal is to maintain sobriety .    Effectiveness of strategies: Strategies appear to be effective. The patient continues to report maintained abstinence. No new or emergent concerns.     Dimension #2 - Biomedical - Risk 0. Patient denies any current health issues or concerns. He does not currently have a PCP, but appears able to get his medical needs met and addressed as necessary. Patient denies any immediate medical needs or concerns. Patient appears to be in adequate physical health and functioning at this time  Specific goals from treatment plan addressed this week:   1. The patient goal is  to maintain good physical health  Effectiveness of strategies: Strategies appear to be effective. No new or emergent biomedical concerns at this time.     Dimension #3 - Emotional/Behavioral/Cognitive - Risk 1. Patient reports mental health diagnoses of depression and bipolar disorder. Patient does not currently have any mental health services as he was recently discharged from residential treatment. He does endorse that he will be seeking psychiatry services. Patient endorses medication to help manage his mental health symptoms. Patient denies any suicidal or homicidal thoughts or plans. Patient is oriented x4. .  Specific goals from treatment plan addressed this week:   1The patient goal is to manage mental health.  2. Remain medication compliant.    Effectiveness of strategies: Strategies appear to be effective. The patient did have a DA with [facility]CO[facility] to confirm diagnoses. He does have a Bipolar diagnoses and is getting therapies for that. The patient reports no new or worsening emotional concerns. No significant changes at this time, patient continues to be stable.     Dimension #4 - Treatment Acceptance/Resistance - Risk 1. Patient reports that he does feel that he is required to be here for treatment, and he would have liked a less intensive program. Patient does verbalize a desire to change his substance use at this time. Patient does appear ambivalent about his need for continued treatment at this time. He does verbalize willingness to follow treatment recommendations at this time. Patient was calm, cooperative, and appropriately behaved throughout this appointment  Specific goals from treatment plan addressed this week:   1.The patient goal is to follow through with intentions to treat chemical dependency concerns.   Effectiveness of strategies: Strategies appear to be effective. The patient is in phase II of SRP. He has followed all treatment expectations and is meeting set goals. The patient  is in the action stage of change. No new concerns.     Dimension #5 - Relapse Potential - Risk 3. Patient reports that he just completed his first treatment. He therefore appears to have some knowledge of addiction, but could benefit from additional education. Patient appears to lack some insight into his use and the impact it had on his life as he appears to minimize his need for treatment and change despite the negative consequences he has experienced. Patient appears to lack the necessary coping skills at this time to help him prevent relapse as he reports just having gotten out of residential treatment, and minimal periods of sobriety recently. Patient therefore appears to currently be at an increased risk of relapse  Specific goals from treatment plan addressed this week:   1. The patient goal is to identify and implement coping skills.   Effectiveness of strategies: Strategies appear to be effective. No concerns or changes at this time. Patient continues to gain insight and skills for relapse.     Dimension #6 - Recovery Environment - Risk 3. Patient is currently unemployed, and so appears to lack structure for his daily life. He was able to identify hobbies and activities he enjoys, but it did not appear that he was actively engaging in them prior to going to treatment. Patient reports that he is currently living at a sober house, and so has some support from his living environment. Patient reports that his family is supportive of his sobriety, and about half of his friends are as well. Some of the patient's friends could increase his risk of relapse as they drank together historically. Patient endorses pending legal consequences from a DWI that happened in August 2018. He denies any other current legal involvement.  Specific goals from treatment plan addressed this week:   1.The patient goal is to develop sober structured activities.  Effectiveness of strategies: Strategies appear to be effective. The  patient reports that he would like to find new housing. He is interested in independent housing and this writer is going to assist the patient in this.     T) Treatment plan updated no.  Patient notified and in agreement NA.  Patient educated on Relationships Patient has completed 73 of 108 program hours at this time. Projected discharge date is 10/30/19. Current discharge plan is Phase III.     Mayte BELEM Olea [facility]  9/12/2019, 2:51 PM          Psycho-Educational Curriculum  Date Attended  Psycho-Educational Curriculum  Date Attended    8 Dimensions of wellness  8/5-8/9 Grief and Loss  7/29-8/2   Emotional   Stages of grief    Physical   Memorialized     Intellectual   Letters to loved ones     Occupational   Grief group    Spiritual   Loneliness    Environmental   Purpose and Hobbies    Financial   Values    Social   Hobbies    Access to Care  8/12-8/16 Pat      Service Access   Volunteer Work     Pain Management   Experiential Learning     Memory   Value of movement     Physical Wellness  Relationships  9/4-9/6 9/9-9/13   Medication   Self-Love     PAWS   Resentment    Hygiene (Sleep, Physical, etc)   Communication Skills     Emotional Wellbeing  8/19-8/23 Amends     Healthy vs. Unhealthy Feelings  Family     Affirmations  Social Anxieties     Co-Occurring Disorders  Legacy     Anxiety   Support(+ & -)    Depression  Assertive Communication     Grounding  Codependency    Trauma/Victim Identity   Boundaries    MH/CD Acceptance   Defense Mechanisms     Sober Structure  7/5-7/12 Relapse Prevention  7/15-7/19   Sober support groups   Triggers and High Risk Situations    Needds  Relapse Prevention Plan     Spirituality   Coping Skills     Schedule   Addictive Thoughts    Sobriety Vs. Recovery   Relapse Process     Power of Now   What is Addiction     Truth in life   Impulsive/Compulsive Behaviors     Recovery Investement   Cross Addiction     Recovery Influences   Early Recovery       Acceptance  8/26-8/30    Educational Videos   Mindfulness    No Kidding Me 2!       Anonymous People       Ike's Story       Pleasure Unwoven

## 2021-06-01 NOTE — PROGRESS NOTES
Willie Dougherty attended 3 hours of group on 9/16/2019.     The group topic was Sober Structure, patient was responsive to topic.     Patients engagement in the group session: low     Total number of patients present 8.     Supervising MD: Dr. Sai Olea, [facility]  9/16/2019, 3:30 PM

## 2021-06-01 NOTE — PROGRESS NOTES
Correct pharmacy verified with patient and confirmed in snapshot? [x] yes []no    Charge captured ? [x] yes  [] no    Medications Phoned  to Pharmacy [] yes [x]no  Name of Pharmacist:  List Medications, including dose, quantity and instructions      Medication Prescriptions given to patient   [] yes  [x] no   List the name of the drug the prescription was written for.       Medications ordered this visit were e-scribed.  Verified by order class [x] yes  [] no  Lamictal 25 mg  Medication changes or discontinuations were communicated to patient's pharmacy: [] yes  [x] no    UA collected [x] yes  [] no    [place] Prescription Monitoring Program Reviewed? [x] yes  [] no    Referrals were made to:  none    Future appointment was made: [x] yes  [] no  10/29/19  Dictation completed at time of chart check: [] yes  [x] no    I have checked the documentation for today s encounters and the above information has been reviewed and completed.

## 2021-06-01 NOTE — PROGRESS NOTES
Weekly Progress Note  Willie Dougherty  1960  [id number]      D) Pt attended 1 groups this week with 0 absences. Patient attended 0 individual sessions this week. Patient is in phase II of SRP.  A) Staff facilitated groups and reviewed tx progress. [facility] for [facility]. R) No VAP needed at this time.   Any significant events, defines as events that impact patients relationship with others inside and outside of treatment: The patient has stepped down from phase I to phase II of the senior recovery program.   Indicate any changes or monitoring of physical or mental health problems: No emergent concerns     Indicate involvement by any outside supports: Sober living   IAPP reviewed and modified as needed. NA  Patient was staffed with Dr. Gibbs and Dahlia Penn [facility] on 8/29/19.   Pt working on the following dimensions:  Dimension #1 - Withdrawal Potential - Risk 0. Patient reports a history of regular alcohol use. He states that he was recently discharged from residential treatment at [facility]. Patient denies any alcohol use since being out of treatment. He reports his last use of alcohol was in January 2019. Patient denies any current or past withdrawal symptoms. He shows no physical signs or symptoms of intoxication or withdrawal. Patient is oriented x4  Specific goals from treatment plan addressed this week:   1.The patient goal is to maintain sobriety .    Effectiveness of strategies: Strategies appear to be effective. The patient reports no use and no concerns.     Dimension #2 - Biomedical - Risk 0. Patient denies any current health issues or concerns. He does not currently have a PCP, but appears able to get his medical needs met and addressed as necessary. Patient denies any immediate medical needs or concerns. Patient appears to be in adequate physical health and functioning at this time  Specific goals from treatment plan addressed this week:   1. The patient goal is to maintain good physical  health  Effectiveness of strategies: Strategies appear to be effective. The patient is not endorsing any biomedical concerns at this time.     Dimension #3 - Emotional/Behavioral/Cognitive - Risk 1. Patient reports mental health diagnoses of depression and bipolar disorder. Patient does not currently have any mental health services as he was recently discharged from residential treatment. He does endorse that he will be seeking psychiatry services. Patient endorses medication to help manage his mental health symptoms. Patient denies any suicidal or homicidal thoughts or plans. Patient is oriented x4. .  Specific goals from treatment plan addressed this week:   1The patient goal is to manage mental health.  2. Remain medication compliant.    Effectiveness of strategies: Strategies appear to be effective. The patient [facility]. He does have a Bipolar diagnoses and is getting therapies for that. The patient reports no new or worsening emotional concerns. He is stable at this time. He has an upcoming appointment with Dr. Gibbs for medication management.     Dimension #4 - Treatment Acceptance/Resistance - Risk 1. Patient reports that he does feel that he is required to be here for treatment, and he would have liked a less intensive program. Patient does verbalize a desire to change his substance use at this time. Patient does appear ambivalent about his need for continued treatment at this time. He does verbalize willingness to follow treatment recommendations at this time. Patient was calm, cooperative, and appropriately behaved throughout this appointment  Specific goals from treatment plan addressed this week:   1.The patient goal is to follow through with intentions to treat chemical dependency concerns.   Effectiveness of strategies: Strategies appear to be effective. The patient is in phase II of SRP. He has followed all treatment expectations and is meeting set  goals. The patient is in the action stage of change. No changes at this time.     Dimension #5 - Relapse Potential - Risk 3. Patient reports that he just completed his first treatment. He therefore appears to have some knowledge of addiction, but could benefit from additional education. Patient appears to lack some insight into his use and the impact it had on his life as he appears to minimize his need for treatment and change despite the negative consequences he has experienced. Patient appears to lack the necessary coping skills at this time to help him prevent relapse as he reports just having gotten out of residential treatment, and minimal periods of sobriety recently. Patient therefore appears to currently be at an increased risk of relapse  Specific goals from treatment plan addressed this week:   1. The patient goal is to identify and implement coping skills.   Effectiveness of strategies: Strategies appear to be effective. The patient reports no relapses, urges or triggers over the last week. He appears to be able to apply his coping skills effectively with minimal concerns.      Dimension #6 - Recovery Environment - Risk 3. Patient is currently unemployed, and so appears to lack structure for his daily life. He was able to identify hobbies and activities he enjoys, but it did not appear that he was actively engaging in them prior to going to treatment. Patient reports that he is currently living at a sober house, and so has some support from his living environment. Patient reports that his family is supportive of his sobriety, and about half of his friends are as well. Some of the patient's friends could increase his risk of relapse as they drank together historically. Patient endorses pending legal consequences from a DWI that happened in August 2018. He denies any other current legal involvement.  Specific goals from treatment plan addressed this week:   1.The patient goal is to develop sober structured  activities.  Effectiveness of strategies: Strategies appear to be effective. The patient is engaged with sober peers and groups. He reports some structured activities.     T) Treatment plan updated no.  Patient notified and in agreement NA.  Patient educated on Relationships Patient has completed 67 of 108 program hours at this time. Projected discharge date is 10/30/19. Current discharge plan is Phase III.     Mayte Olea [facility]  9/5/2019, 11:36 AM          Psycho-Educational Curriculum  Date Attended  Psycho-Educational Curriculum  Date Attended    8 Dimensions of wellness  8/5-8/9 Grief and Loss  7/29-8/2   Emotional   Stages of grief    Physical   Memorialized     Intellectual   Letters to loved ones     Occupational   Grief group    Spiritual   Loneliness    Environmental   Purpose and Hobbies    Financial   Values    Social   Hobbies    Access to Care  8/12-8/16 Pat      Service Access   Volunteer Work     Pain Management   Experiential Learning     Memory   Value of movement     Physical Wellness  Relationships  9/4-9/6   Medication   Self-Love     PAWS   Resentment    Hygiene (Sleep, Physical, etc)   Communication Skills     Emotional Wellbeing  8/19-8/23 Amends     Healthy vs. Unhealthy Feelings  Family     Affirmations  Social Anxieties     Co-Occurring Disorders  Legacy     Anxiety   Support(+ & -)    Depression  Assertive Communication     Grounding  Codependency    Trauma/Victim Identity   Boundaries    MH/CD Acceptance   Defense Mechanisms     Sober Structure  7/5-7/12 Relapse Prevention  7/15-7/19   Sober support groups   Triggers and High Risk Situations    Needds  Relapse Prevention Plan     Spirituality   Coping Skills     Schedule   Addictive Thoughts    Sobriety Vs. Recovery   Relapse Process     Power of Now   What is Addiction     Truth in life   Impulsive/Compulsive Behaviors     Recovery Investement   Cross Addiction     Recovery Influences   Early Recovery       Acceptance  8/26-8/30    Educational Videos   Mindfulness    No Kidding Me 2!       Anonymous People       Ike's Story       Pleasure Unwoven

## 2021-06-01 NOTE — PROGRESS NOTES
Willie Dougherty attended 3 hours of group on 9/6/2019.     The group topic was Relationships, patient was responsive to topic.     Patients engagement in the group session: low     Total number of patients present 10.     Supervising MD: Dr. Param Olea, [facility]  9/6/2019, 3:21 PM

## 2021-06-02 NOTE — PROGRESS NOTES
Willie Dougherty attended 2 hours of group on 10/8/2019.     The group topic was Pat, patient was responsive to topic.     Patients engagement in the group session: medium     Total number of patients present 4.     Supervising MD: Dr. Sai Olea, [facility]  10/8/2019, 11:19 AM

## 2021-06-02 NOTE — PROGRESS NOTES
Weekly Progress Note  Willie Dougherty  1960  [id number]        D) Pt attended 1 groups this week with 0 absences. Patient attended 0 individual sessions this week. Patient is in phase III of SRP.  A) Staff facilitated groups and reviewed tx progress. Assessed for [facility]. R) No VAP needed at this time.   Any significant events, defines as events that impact patients relationship with others inside and outside of treatment: None at this time.   Indicate any changes or monitoring of physical or mental health problems: No emergent concerns      Indicate involvement by any outside supports: The patient identifies weekly that he engages with sober peers and enjoys doing various activities with his support network.   IAPP reviewed and modified as needed. NA  Patient was staffed with Dr. Gibbs and Dahlia Penn [facility] on 10/10/19.   Pt working on the following dimensions:  Dimension #1 - Withdrawal Potential - Risk 0. Patient reports a history of regular alcohol use. He states that he was recently discharged from residential treatment at [facility]. Patient denies any alcohol use since being out of treatment. He reports his last use of alcohol was in January 2019. Patient denies any current or past withdrawal symptoms. He shows no physical signs or symptoms of intoxication or withdrawal. Patient is oriented x4  Specific goals from treatment plan addressed this week:   1.The patient goal is to maintain sobriety .    Effectiveness of strategies: Strategies appear to be effective. The patient reports no use over the last week.      Dimension #2 - Biomedical - Risk 0. Patient denies any current health issues or concerns. He does not currently have a PCP, but appears able to get his medical needs met and addressed as necessary. Patient denies any immediate medical needs or concerns. Patient appears to be in adequate physical health and functioning at this time  Specific goals from treatment plan addressed this week:   1. The  patient goal is to maintain good physical health  Effectiveness of strategies: Strategies appear to be effective. At this time the patient is reporting stable health. No new or emergent concerns.      Dimension #3 - Emotional/Behavioral/Cognitive - Risk 1. Patient reports mental health diagnoses of depression and bipolar disorder. Patient does not currently have any mental health services as he was recently discharged from residential treatment. He does endorse that he will be seeking psychiatry services. Patient endorses medication to help manage his mental health symptoms. Patient denies any suicidal or homicidal thoughts or plans. Patient is oriented x4. .  Specific goals from treatment plan addressed this week:   1The patient goal is to manage mental health.  2. Remain medication compliant.    Effectiveness of strategies: Strategies appear to be effective. The patient reports stable mental health. He is remaining medication compliant.      Dimension #4 - Treatment Acceptance/Resistance - Risk 1. Patient reports that he does feel that he is required to be here for treatment, and he would have liked a less intensive program. Patient does verbalize a desire to change his substance use at this time. Patient does appear ambivalent about his need for continued treatment at this time. He does verbalize willingness to follow treatment recommendations at this time. Patient was calm, cooperative, and appropriately behaved throughout this appointment  Specific goals from treatment plan addressed this week:   1.The patient goal is to follow through with intentions to treat chemical dependency concerns.   Effectiveness of strategies: Strategies appear to be effective. Patient is now in phase III of the SRP. He spoke little, but always appeared engaged in group discussions this week. In action stage of change. No change.      Dimension #5 - Relapse Potential - Risk 3. Patient reports that he just completed his first  treatment. He therefore appears to have some knowledge of addiction, but could benefit from additional education. Patient appears to lack some insight into his use and the impact it had on his life as he appears to minimize his need for treatment and change despite the negative consequences he has experienced. Patient appears to lack the necessary coping skills at this time to help him prevent relapse as he reports just having gotten out of residential treatment, and minimal periods of sobriety recently. Patient therefore appears to currently be at an increased risk of relapse  Specific goals from treatment plan addressed this week:   1. The patient goal is to identify and implement coping skills.   Effectiveness of strategies: Strategies appear to be effective. The patient reports maintained sobriety with no urges or triggers.      Dimension #6 - Recovery Environment - Risk 3. Patient is currently unemployed, and so appears to lack structure for his daily life. He was able to identify hobbies and activities he enjoys, but it did not appear that he was actively engaging in them prior to going to treatment. Patient reports that he is currently living at a sober house, and so has some support from his living environment. Patient reports that his family is supportive of his sobriety, and about half of his friends are as well. Some of the patient's friends could increase his risk of relapse as they drank together historically. Patient endorses pending legal consequences from a DWI that happened in August 2018. He denies any other current legal involvement.  Specific goals from treatment plan addressed this week:   1.The patient goal is to develop sober structured activities.  Effectiveness of strategies: Strategies appear to be effective. Patient reports he went hiking w/friends by [place] and worked out at the gym twice in the last week.  Also reports doing housework and yardwork.  Goal this week is to help his  brother paint and do other work in a bedroom. No new concerns.      T) Treatment plan updated no.  Patient notified and in agreement NA.  Patient educated on 8 Dimensions of Wellness. Patient has completed 85 phase I & II program hours. Currently has completed 6/24 phase III hours . Projected discharge date is 10/30/19. Current discharge plan is Phase TBD.     Psycho-Educational Curriculum  Date Attended  Psycho-Educational Curriculum  Date Attended    8 Dimensions of wellness  8/5-8/9 Grief and Loss  7/29-8/2   Emotional    Stages of grief     Physical    Memorialized      Intellectual    Letters to loved ones      Occupational    Grief group     Spiritual    Loneliness     Environmental    Purpose and Hobbies     Financial    Values     Social    Hobbies     Access to Care  8/12-8/16 Pat      Service Access    Volunteer Work      Pain Management    Experiential Learning      Memory    Value of movement      Physical Wellness   Relationships  9/4-9/6 9/9-9/13   Medication    Self-Love      PAWS    Resentment     Hygiene (Sleep, Physical, etc)    Communication Skills      Emotional Wellbeing  8/19-8/23 Amends      Healthy vs. Unhealthy Feelings   Family      Affirmations   Social Anxieties      Co-Occurring Disorders   Legacy      Anxiety    Support(+ & -)     Depression   Assertive Communication      Grounding   Codependency     Trauma/Victim Identity    Boundaries     MH/CD Acceptance    Defense Mechanisms      Sober Structure  7/5-7/12 9/16-9/20 Relapse Prevention  7/15-7/19 9/23-9/27   Sober support groups    Triggers and High Risk Situations     Needds   Relapse Prevention Plan      Spirituality    Coping Skills      Schedule   Addictive Thoughts     Sobriety Vs. Recovery    Relapse Process      Power of Now    What is Addiction      Truth in life    Impulsive/Compulsive Behaviors      Recovery Investement    Cross Addiction      Recovery Influences    Early Recovery          Acceptance  8/26-8/30    Educational Videos    Mindfulness     No Kidding Me 2!    Phase III      Anonymous People    Pat  10/8/19   Ike's Story    8 Dimensions/Wellness 10/15/19

## 2021-06-02 NOTE — PROGRESS NOTES
Weekly Progress Note  Willie Dougherty  1960  [id number]        D) Pt attended 1 groups this week with 0 absences. Patient attended 0 individual sessions this week. Patient is in phase III of SRP.  A) Staff facilitated groups and reviewed tx progress. Ass[facility] for [facility]. R) No VAP needed at this time.   Any significant events, defines as events that impact patients relationship with others inside and outside of treatment: None at this time.   Indicate any changes or monitoring of physical or mental health problems: No emergent concerns      Indicate involvement by any outside supports: The patient identifies weekly that he engages with sober peers and enjoys doing various activities with his support network.   IAPP reviewed and modified as needed. NA  Patient was staffed with Dr. Gibbs and Dahlia [facility] on 10/10/19.   Pt working on the following dimensions:  Dimension #1 - Withdrawal Potential - Risk 0. Patient reports a history of regular alcohol use. He states that he was recently discharged from residential treatment at [place]. Patient denies any alcohol use since being out of treatment. He reports his last use of alcohol was in January 2019. Patient denies any current or past withdrawal symptoms. He shows no physical signs or symptoms of intoxication or withdrawal. Patient is oriented x4  Specific goals from treatment plan addressed this week:   1.The patient goal is to maintain sobriety .    Effectiveness of strategies: Strategies appear to be effective. The patient reports maintained abstinence over the last week.      Dimension #2 - Biomedical - Risk 0. Patient denies any current health issues or concerns. He does not currently have a PCP, but appears able to get his medical needs met and addressed as necessary. Patient denies any immediate medical needs or concerns. Patient appears to be in adequate physical health and functioning at this time  Specific goals from treatment plan addressed this  week:   1. The patient goal is to maintain good physical health  Effectiveness of strategies: Strategies appear to be effective. No emergent biomedical concerns reported. Patient did report he was attending a dental appointment today 10/22/2019.     Dimension #3 - Emotional/Behavioral/Cognitive - Risk 1. Patient reports mental health diagnoses of depression and bipolar disorder. Patient does not currently have any mental health services as he was recently discharged from residential treatment. He does endorse that he will be seeking psychiatry services. Patient endorses medication to help manage his mental health symptoms. Patient denies any suicidal or homicidal thoughts or plans. Patient is oriented x4. .  Specific goals from treatment plan addressed this week:   1The patient goal is to manage mental health.  2. Remain medication compliant.    Effectiveness of strategies: Strategies appear to be effective. The patient reports an incident over the week that caused some emotional dysregulation reporting that he sent a text message to his sister that didn't go through. He reports that it triggered some feelings of being left out and when talking about it in group he began to cry. He reports he does not feel very close to his sister and wants to work on that relationship. He identified that he tends to lack emotional regulation when overanalyzing situations.      Dimension #4 - Treatment Acceptance/Resistance - Risk 1. Patient reports that he does feel that he is required to be here for treatment, and he would have liked a less intensive program. Patient does verbalize a desire to change his substance use at this time. Patient does appear ambivalent about his need for continued treatment at this time. He does verbalize willingness to follow treatment recommendations at this time. Patient was calm, cooperative, and appropriately behaved throughout this appointment  Specific goals from treatment plan addressed this  week:   1.The patient goal is to follow through with intentions to treat chemical dependency concerns.   Effectiveness of strategies: Strategies appear to be effective. Patient is now in phase III of the SRP. Over the last week the patient has increased his group engagement and was an active participant.      Dimension #5 - Relapse Potential - Risk 3. Patient reports that he just completed his first treatment. He therefore appears to have some knowledge of addiction, but could benefit from additional education. Patient appears to lack some insight into his use and the impact it had on his life as he appears to minimize his need for treatment and change despite the negative consequences he has experienced. Patient appears to lack the necessary coping skills at this time to help him prevent relapse as he reports just having gotten out of residential treatment, and minimal periods of sobriety recently. Patient therefore appears to currently be at an increased risk of relapse  Specific goals from treatment plan addressed this week:   1. The patient goal is to identify and implement coping skills.   Effectiveness of strategies: Strategies appear to be effective. The patient reports maintained sobriety with no urges or triggers. No changes.      Dimension #6 - Recovery Environment - Risk 3. Patient is currently unemployed, and so appears to lack structure for his daily life. He was able to identify hobbies and activities he enjoys, but it did not appear that he was actively engaging in them prior to going to treatment. Patient reports that he is currently living at a sober house, and so has some support from his living environment. Patient reports that his family is supportive of his sobriety, and about half of his friends are as well. Some of the patient's friends could increase his risk of relapse as they drank together historically. Patient endorses pending legal consequences from a DWI that happened in August 2018. He  denies any other current legal involvement.  Specific goals from treatment plan addressed this week:   1.The patient goal is to develop sober structured activities.  Effectiveness of strategies: Strategies appear to be effective. The patient continues to meet with his recovery group and finds benefit. He reports he continues to do spontaneous activities and enjoys engaging with his friends in fun activities.       T) Treatment plan updated no.  Patient notified and in agreement NA.  Patient educated on 8 Dimensions of Wellness. Patient has completed 85 phase I & II program hours. Currently has completed 9/24 phase III hours . Projected discharge date is 10/30/19. Current discharge plan is Phase TBD.     Psycho-Educational Curriculum  Date Attended  Psycho-Educational Curriculum  Date Attended    8 Dimensions of wellness  8/5-8/9 Grief and Loss  7/29-8/2   Emotional    Stages of grief     Physical    Memorialized      Intellectual    Letters to loved ones      Occupational    Grief group     Spiritual    Loneliness     Environmental    Purpose and Hobbies     Financial    Values     Social    Hobbies     Access to Care  8/12-8/16 Pat      Service Access    Volunteer Work      Pain Management    Experiential Learning      Memory    Value of movement      Physical Wellness   Relationships  9/4-9/6 9/9-9/13   Medication    Self-Love      PAWS    Resentment     Hygiene (Sleep, Physical, etc)    Communication Skills      Emotional Wellbeing  8/19-8/23 Amends      Healthy vs. Unhealthy Feelings   Family      Affirmations   Social Anxieties      Co-Occurring Disorders   Legacy      Anxiety    Support(+ & -)     Depression   Assertive Communication      Grounding   Codependency     Trauma/Victim Identity    Boundaries     MH/CD Acceptance    Defense Mechanisms      Sober Structure  7/5-7/12 9/16-9/20 Relapse Prevention  7/15-7/19 9/23-9/27   Sober support groups    Triggers and High Risk Situations     Needds   Relapse  Prevention Plan      Spirituality    Coping Skills      Schedule   Addictive Thoughts     Sobriety Vs. Recovery    Relapse Process      Power of Now    What is Addiction      Truth in life    Impulsive/Compulsive Behaviors      Recovery Investement    Cross Addiction      Recovery Influences    Early Recovery          Acceptance  8/26-8/30   Educational Videos    Mindfulness     No Kidding Me 2!    Phase III      Anonymous People    Pat  10/8/19     Emotions  10/22   Ike's Story    8 Dimensions/Wellness 10/15/19

## 2021-06-02 NOTE — PROGRESS NOTES
Weekly Progress Note  Willie Dougherty  1960  [id number]      D) Pt attended 1 groups this week with 0 absences. Patient attended 0 individual sessions this week. Patient is in phase III of SRP.  A) Staff facilitated groups and reviewed tx progress. Assessed for [facility]. R) No VAP needed at this time.   Any significant events, defines as events that impact patients relationship with others inside and outside of treatment: None at this time.   Indicate any changes or monitoring of physical or mental health problems: No emergent concerns     Indicate involvement by any outside supports: The patient identifies weekly that he engages with sober peers and enjoys doing various activities with his support network.   IAPP reviewed and modified as needed. NA  Patient was staffed with Dr. Gibbs and Dahlia [facility] on 10/3/19.   Pt working on the following dimensions:  Dimension #1 - Withdrawal Potential - Risk 0. Patient reports a history of regular alcohol use. He states that he was recently discharged from residential treatment at [place]. Patient denies any alcohol use since being out of treatment. He reports his last use of alcohol was in January 2019. Patient denies any current or past withdrawal symptoms. He shows no physical signs or symptoms of intoxication or withdrawal. Patient is oriented x4  Specific goals from treatment plan addressed this week:   1.The patient goal is to maintain sobriety .    Effectiveness of strategies: Strategies appear to be effective. The patient reports no use over the last week.     Dimension #2 - Biomedical - Risk 0. Patient denies any current health issues or concerns. He does not currently have a PCP, but appears able to get his medical needs met and addressed as necessary. Patient denies any immediate medical needs or concerns. Patient appears to be in adequate physical health and functioning at this time  Specific goals from treatment plan addressed this week:   1. The patient  goal is to maintain good physical health  Effectiveness of strategies: Strategies appear to be effective. At this time the patient is reporting stable health. No new or emergent concerns.     Dimension #3 - Emotional/Behavioral/Cognitive - Risk 1. Patient reports mental health diagnoses of depression and bipolar disorder. Patient does not currently have any mental health services as he was recently discharged from residential treatment. He does endorse that he will be seeking psychiatry services. Patient endorses medication to help manage his mental health symptoms. Patient denies any suicidal or homicidal thoughts or plans. Patient is oriented x4. .  Specific goals from treatment plan addressed this week:   1The patient goal is to manage mental health.  2. Remain medication compliant.    Effectiveness of strategies: Strategies appear to be effective. The patient reports stable mental health. He is remaining medication compliant.     Dimension #4 - Treatment Acceptance/Resistance - Risk 1. Patient reports that he does feel that he is required to be here for treatment, and he would have liked a less intensive program. Patient does verbalize a desire to change his substance use at this time. Patient does appear ambivalent about his need for continued treatment at this time. He does verbalize willingness to follow treatment recommendations at this time. Patient was calm, cooperative, and appropriately behaved throughout this appointment  Specific goals from treatment plan addressed this week:   1.The patient goal is to follow through with intentions to treat chemical dependency concerns.   Effectiveness of strategies: Strategies appear to be effective. Patient is now in phase III of the SRP. He was an active and engaged group member. In action stage of change. No change.     Dimension #5 - Relapse Potential - Risk 3. Patient reports that he just completed his first treatment. He therefore appears to have some knowledge  of addiction, but could benefit from additional education. Patient appears to lack some insight into his use and the impact it had on his life as he appears to minimize his need for treatment and change despite the negative consequences he has experienced. Patient appears to lack the necessary coping skills at this time to help him prevent relapse as he reports just having gotten out of residential treatment, and minimal periods of sobriety recently. Patient therefore appears to currently be at an increased risk of relapse  Specific goals from treatment plan addressed this week:   1. The patient goal is to identify and implement coping skills.   Effectiveness of strategies: Strategies appear to be effective. The patient reports maintained sobriety with no urges or triggers.     Dimension #6 - Recovery Environment - Risk 3. Patient is currently unemployed, and so appears to lack structure for his daily life. He was able to identify hobbies and activities he enjoys, but it did not appear that he was actively engaging in them prior to going to treatment. Patient reports that he is currently living at a sober house, and so has some support from his living environment. Patient reports that his family is supportive of his sobriety, and about half of his friends are as well. Some of the patient's friends could increase his risk of relapse as they drank together historically. Patient endorses pending legal consequences from a DWI that happened in August 2018. He denies any other current legal involvement.  Specific goals from treatment plan addressed this week:   1.The patient goal is to develop sober structured activities.  Effectiveness of strategies: Strategies appear to be effective. The patient and this writer are still working on individualized housing. He is engaged in sober support groups. No new concerns.     T) Treatment plan updated no.  Patient notified and in agreement NA.  Patient educated on Pat Patient has  completed 85 phase I & II program hours. Currently has 4/24 phase III hours . Projected discharge date is 10/30/19. Current discharge plan is Phase III.     ROSARIO Baird  10/8/2019, 3:21 PM          Psycho-Educational Curriculum  Date Attended  Psycho-Educational Curriculum  Date Attended    8 Dimensions of wellness  8/5-8/9 Grief and Loss  7/29-8/2   Emotional   Stages of grief    Physical   Memorialized     Intellectual   Letters to loved ones     Occupational   Grief group    Spiritual   Loneliness    Environmental   Purpose and Hobbies    Financial   Values    Social   Hobbies    Access to Care  8/12-8/16 Pat      Service Access   Volunteer Work     Pain Management   Experiential Learning     Memory   Value of movement     Physical Wellness  Relationships  9/4-9/6 9/9-9/13   Medication   Self-Love     PAWS   Resentment    Hygiene (Sleep, Physical, etc)   Communication Skills     Emotional Wellbeing  8/19-8/23 Amends     Healthy vs. Unhealthy Feelings  Family     Affirmations  Social Anxieties     Co-Occurring Disorders  Legacy     Anxiety   Support(+ & -)    Depression  Assertive Communication     Grounding  Codependency    Trauma/Victim Identity   Boundaries    MH/CD Acceptance   Defense Mechanisms     Sober Structure  7/5-7/12 9/16-9/20 Relapse Prevention  7/15-7/19 9/23-9/27   Sober support groups   Triggers and High Risk Situations    Needds  Relapse Prevention Plan     Spirituality   Coping Skills     Schedule   Addictive Thoughts    Sobriety Vs. Recovery   Relapse Process     Power of Now   What is Addiction     Truth in life   Impulsive/Compulsive Behaviors     Recovery Investement   Cross Addiction     Recovery Influences   Early Recovery       Acceptance  8/26-8/30   Educational Videos   Mindfulness    No Kidding Me 2!   Phase III     Anonymous People   Pat  10/8/19   Ike's Story       Ple[employer]

## 2021-06-02 NOTE — PROGRESS NOTES
Willie Dougherty attended 2 hours of group on 10/15/2019.     The group topic was 8 Dimensions of Wellness, patient was responsive to topic.     Patient's engagement in the group session: high     Total number of patients present 4.     Supervising MD: Dr. Param Matthews, [facility]  10/15/2019, 1:53 PM

## 2021-06-02 NOTE — PROGRESS NOTES
Willie Dougherty attended 2 hours of group on 10/22/2019.     The group topic was Emotions/Pat, patient was responsive to topic.     Patient's engagement in the group session: high     Total number of patients present 3.     Supervising MD: Dr. Brunner Samantha S Peterson, [facility]  10/22/2019, 11:14 AM

## 2021-06-03 VITALS
HEART RATE: 66 BPM | HEIGHT: 66 IN | DIASTOLIC BLOOD PRESSURE: 86 MMHG | BODY MASS INDEX: 26.52 KG/M2 | SYSTOLIC BLOOD PRESSURE: 114 MMHG | WEIGHT: 165 LBS | TEMPERATURE: 97.6 F

## 2021-06-03 VITALS — WEIGHT: 167 LBS | HEIGHT: 66 IN | BODY MASS INDEX: 26.84 KG/M2

## 2021-06-03 VITALS — HEIGHT: 66 IN | WEIGHT: 167 LBS | BODY MASS INDEX: 26.84 KG/M2

## 2021-06-03 VITALS
HEART RATE: 76 BPM | SYSTOLIC BLOOD PRESSURE: 140 MMHG | HEIGHT: 66 IN | WEIGHT: 168 LBS | DIASTOLIC BLOOD PRESSURE: 86 MMHG | BODY MASS INDEX: 27 KG/M2

## 2021-06-03 VITALS — WEIGHT: 165 LBS | BODY MASS INDEX: 26.52 KG/M2 | HEIGHT: 66 IN

## 2021-06-03 NOTE — PROGRESS NOTES
Weekly Progress Note  Willie Dougherty  1960  [id number]        D) Pt attended 1 groups this week with 0 absences. Patient attended 1 individual sessions this week. Patient is in phase III of SRP.  A) Staff facilitated groups and reviewed tx progress. [facility] for [facility]. R) No VAP needed at this time.   Any significant events, defines as events that impact patients relationship with others inside and outside of treatment: None at this time.   Indicate any changes or monitoring of physical or mental health problems: No emergent concerns      Indicate involvement by any outside supports: The patient identifies weekly that he engages with sober peers and enjoys doing various activities with his support network.   IAPP reviewed and modified as needed. NA  Patient was staffed with Dr. Gibbs and [facility] on 11/14/19.   Pt working on the following dimensions:  Dimension #1 - Withdrawal Potential - Risk 0. Patient reports a history of regular alcohol use. He states that he was recently discharged from residential treatment at [place]. Patient denies any alcohol use since being out of treatment. He reports his last use of alcohol was in January 2019. Patient denies any current or past withdrawal symptoms. He shows no physical signs or symptoms of intoxication or withdrawal. Patient is oriented x4  Specific goals from treatment plan addressed this week:   1.The patient goal is to maintain sobriety .    Effectiveness of strategies: Strategies appear to be effective. The patient did not endorse any use over the last week.      Dimension #2 - Biomedical - Risk 0. Patient denies any current health issues or concerns. He does not currently have a PCP, but appears able to get his medical needs met and addressed as necessary. Patient denies any immediate medical needs or concerns. Patient appears to be in adequate physical health and functioning at this time  Specific goals from treatment plan addressed this week:    1. The patient goal is to maintain good physical health  Effectiveness of strategies: Strategies appear to be effective. The patient is working on obtaining a supplemental policy for his insurance through [place]. He is filling out paperwork and was told that should he struggle he can set up an appointment. Patient did not endorse any emergent biomedical concerns.       Dimension #3 - Emotional/Behavioral/Cognitive - Risk 1. Patient reports mental health diagnoses of depression and bipolar disorder. Patient does not currently have any mental health services as he was recently discharged from residential treatment. He does endorse that he will be seeking psychiatry services. Patient endorses medication to help manage his mental health symptoms. Patient denies any suicidal or homicidal thoughts or plans. Patient is oriented x4. .  Specific goals from treatment plan addressed this week:   1The patient goal is to manage mental health.  2. Remain medication compliant.    Effectiveness of strategies: Strategies appear to be effective. The patient is medication compliant at this time and attributes that to his mental health stability. He has been doing a good job at connecting with his emotions over the last couple of weeks and has been genuinely feeling vin.  The patient reports he agreed to family therapy with his parents and siblings so he is looking forward to that on 12/12/19.    Dimension #4 - Treatment Acceptance/Resistance - Risk 1. Patient reports that he does feel that he is required to be here for treatment, and he would have liked a less intensive program. Patient does verbalize a desire to change his substance use at this time. Patient does appear ambivalent about his need for continued treatment at this time. He does verbalize willingness to follow treatment recommendations at this time. Patient was calm, cooperative, and appropriately behaved throughout this appointment  Specific goals from treatment plan  addressed this week:   1.The patient goal is to follow through with intentions to treat chemical dependency concerns.   Effectiveness of strategies: Strategies appear to be effective. Patient is now in phase III of the SRP. The patient is a good group participant who is active and engaged. He is in the action stage of change.  No changes or new concerns.      Dimension #5 - Relapse Potential - Risk 3. Patient reports that he just completed his first treatment. He therefore appears to have some knowledge of addiction, but could benefit from additional education. Patient appears to lack some insight into his use and the impact it had on his life as he appears to minimize his need for treatment and change despite the negative consequences he has experienced. Patient appears to lack the necessary coping skills at this time to help him prevent relapse as he reports just having gotten out of residential treatment, and minimal periods of sobriety recently. Patient therefore appears to currently be at an increased risk of relapse  Specific goals from treatment plan addressed this week:   1. The patient goal is to identify and implement coping skills.   Effectiveness of strategies: Strategies appear to be effective. The patient reports no urges or triggers over the last week. Patient is reporting that he has been doing a good job at setting boundaries with people and places that could be triggering and has been able to have better insight into relapse processes.      Dimension #6 - Recovery Environment - Risk 3. Patient is currently unemployed, and so appears to lack structure for his daily life. He was able to identify hobbies and activities he enjoys, but it did not appear that he was actively engaging in them prior to going to treatment. Patient reports that he is currently living at a sober house, and so has some support from his living environment. Patient reports that his family is supportive of his sobriety, and about  half of his friends are as well. Some of the patient's friends could increase his risk of relapse as they drank together historically. Patient endorses pending legal consequences from a DWI that happened in August 2018. He denies any other current legal involvement.  Specific goals from treatment plan addressed this week:   1.The patient goal is to develop sober structured activities.  Effectiveness of strategies: Strategies appear to be effective. The patient continues to engage with sober peers and sober support. He has been actively seeking out sober support groups and sober peers. Patient is handling his legal concerns at this time. He is looking for long term [facility] housing and has been given information to complete this. The patient did attend [employer] over the last week and found it to be enjoyable.     T) Treatment plan updated no.  Patient notified and in agreement NA.  Patient educated on maintenance. Patient has completed 85 phase I & II program hours. Currently has completed 20/24 phase III hours . Projected discharge date is 10/30/19. Current discharge plan is Phase TBD.     Psycho-Educational Curriculum  Date Attended  Psycho-Educational Curriculum  Date Attended    8 Dimensions of wellness  8/5-8/9 Grief and Loss  7/29-8/2   Emotional    Stages of grief     Physical    Memorialized      Intellectual    Letters to loved ones      Occupational    Grief group     Spiritual    Loneliness     Environmental    Purpose and Hobbies     Financial    Values     Social    Hobbies     Access to Care  8/12-8/16 Pat      Service Access    Volunteer Work      Pain Management    Experiential Learning      Memory    Value of movement      Physical Wellness   Relationships  9/4-9/6 9/9-9/13   Medication    Self-Love      PAWS    Resentment     Hygiene (Sleep, Physical, etc)    Communication Skills      Emotional Wellbeing  8/19-8/23 Amends      Healthy vs. Unhealthy Feelings   Family      Affirmations   Social Anxieties       Co-Occurring Disorders   Legacy      Anxiety    Support(+ & -)     Depression   Assertive Communication      Grounding   Codependency     Trauma/Victim Identity    Boundaries     MH/CD Acceptance    Defense Mechanisms      Sober Structure  7/5-7/12 9/16-9/20 Relapse Prevention  7/15-7/19 9/23-9/27   Sober support groups    Triggers and High Risk Situations     Needds   Relapse Prevention Plan      Spirituality    Coping Skills      Schedule   Addictive Thoughts     Sobriety Vs. Recovery    Relapse Process      Power of Now    What is Addiction      Truth in life    Impulsive/Compulsive Behaviors      Recovery Investement    Cross Addiction      Recovery Influences    Early Recovery          Acceptance  8/26-8/30   Educational Videos    Mindfulness     No Kidding Me 2!    Phase III        Holiday relapse prevention  11/26   Anonymous People    Pat  10/8/19     Be the stick  11/5/19     Emotions  10/22     Processing  11/12     Maintenance  11/19/19   Ike's Story    8 Dimensions/Wellness 10/15/19

## 2021-06-03 NOTE — PROGRESS NOTES
Correct pharmacy verified with patient and confirmed in snapshot? [x] yes []no    Charge captured ? [x] yes  [] no    Medications Phoned  to Pharmacy [] yes [x]no  Name of Pharmacist:  List Medications, including dose, quantity and instructions      Medication Prescriptions given to patient   [] yes  [x] no   List the name of the drug the prescription was written for.       Medications ordered this visit were e-scribed.  Verified by order class [x] yes  [] no  lamoTRIgine (LAMICTAL) 100 MG tablet 60 tablet 2 11/19/2019     Medication changes or discontinuations were communicated to patient's pharmacy: [x] yes  [] no  lamoTRIgine (LAMICTAL) 25 MG tablet (Discontinued)  UA collected [x] yes  [] no    [place] Prescription Monitoring Program Reviewed? [x] yes  [] no    Referrals were made to:  no    Future appointment was made: [x] yes  [] no    Dictation completed at time of chart check: [x] yes  [] no    I have checked the documentation for today s encounters and the above information has been reviewed and completed.

## 2021-06-03 NOTE — TELEPHONE ENCOUNTER
Patient is interested in increasing the lamictal. He is currently taking 50mg daily and was told by Dr. Gibbs that they would increase his dosage slowly over a number of weeks. He says that he has been taking the 50mg daily for 3 weeks and would like to increase because he is noticing some mild symptoms of carmen.

## 2021-06-03 NOTE — PROGRESS NOTES
Weekly Progress Note  Willie Dougherty  1960  [id number]        D) Pt attended 1 groups this week with 0 absences. Patient attended 0 individual sessions this week. Patient is in phase III of SRP.  A) Staff facilitated groups and reviewed tx progress. [facility] for [facility]. R) No VAP needed at this time.   Any significant events, defines as events that impact patients relationship with others inside and outside of treatment: None at this time.   Indicate any changes or monitoring of physical or mental health problems: No emergent concerns      Indicate involvement by any outside supports: The patient identifies weekly that he engages with sober peers and enjoys doing various activities with his support network.   IAPP reviewed and modified as needed. NA  Patient was staffed with Dr. Gibbs and Dahlia [facility] on 10/31/19.   Pt working on the following dimensions:  Dimension #1 - Withdrawal Potential - Risk 0. Patient reports a history of regular alcohol use. He states that he was recently discharged from residential treatment at [place]. Patient denies any alcohol use since being out of treatment. He reports his last use of alcohol was in January 2019. Patient denies any current or past withdrawal symptoms. He shows no physical signs or symptoms of intoxication or withdrawal. Patient is oriented x4  Specific goals from treatment plan addressed this week:   1.The patient goal is to maintain sobriety .    Effectiveness of strategies: Strategies appear to be effective. No use reported over the last week.      Dimension #2 - Biomedical - Risk 0. Patient denies any current health issues or concerns. He does not currently have a PCP, but appears able to get his medical needs met and addressed as necessary. Patient denies any immediate medical needs or concerns. Patient appears to be in adequate physical health and functioning at this time  Specific goals from treatment plan addressed this week:   1. The patient goal  is to maintain good physical health  Effectiveness of strategies: Strategies appear to be effective. No new or worsening biomedical concerns.      Dimension #3 - Emotional/Behavioral/Cognitive - Risk 1. Patient reports mental health diagnoses of depression and bipolar disorder. Patient does not currently have any mental health services as he was recently discharged from residential treatment. He does endorse that he will be seeking psychiatry services. Patient endorses medication to help manage his mental health symptoms. Patient denies any suicidal or homicidal thoughts or plans. Patient is oriented x4. .  Specific goals from treatment plan addressed this week:   1The patient goal is to manage mental health.  2. Remain medication compliant.    Effectiveness of strategies: Strategies appear to be effective. The patient reports he has been finding vin more often and this is very comforting. He reports he is stable with his mental health.     Dimension #4 - Treatment Acceptance/Resistance - Risk 1. Patient reports that he does feel that he is required to be here for treatment, and he would have liked a less intensive program. Patient does verbalize a desire to change his substance use at this time. Patient does appear ambivalent about his need for continued treatment at this time. He does verbalize willingness to follow treatment recommendations at this time. Patient was calm, cooperative, and appropriately behaved throughout this appointment  Specific goals from treatment plan addressed this week:   1.The patient goal is to follow through with intentions to treat chemical dependency concerns.   Effectiveness of strategies: Strategies appear to be effective. Patient is now in phase III of the SRP. Over the last week the patient has increased his group engagement and was an active participant. He has been showing more emotions in the group setting.      Dimension #5 - Relapse Potential - Risk 3. Patient reports that he  just completed his first treatment. He therefore appears to have some knowledge of addiction, but could benefit from additional education. Patient appears to lack some insight into his use and the impact it had on his life as he appears to minimize his need for treatment and change despite the negative consequences he has experienced. Patient appears to lack the necessary coping skills at this time to help him prevent relapse as he reports just having gotten out of residential treatment, and minimal periods of sobriety recently. Patient therefore appears to currently be at an increased risk of relapse  Specific goals from treatment plan addressed this week:   1. The patient goal is to identify and implement coping skills.   Effectiveness of strategies: Strategies appear to be effective. The patient reports no triggers over the last week. He is reporting that he can implement coping skills effectively and make them work for him.      Dimension #6 - Recovery Environment - Risk 3. Patient is currently unemployed, and so appears to lack structure for his daily life. He was able to identify hobbies and activities he enjoys, but it did not appear that he was actively engaging in them prior to going to treatment. Patient reports that he is currently living at a sober house, and so has some support from his living environment. Patient reports that his family is supportive of his sobriety, and about half of his friends are as well. Some of the patient's friends could increase his risk of relapse as they drank together historically. Patient endorses pending legal consequences from a DWI that happened in August 2018. He denies any other current legal involvement.  Specific goals from treatment plan addressed this week:   1.The patient goal is to develop sober structured activities.  Effectiveness of strategies: Strategies appear to be effective. The patient continues to meet with his recovery group and finds benefit. He  reports he continues to do spontaneous activities and enjoys engaging with his friends in fun activities.     He was given an application for housing and offered assistance to fill it ou.     T) Treatment plan updated no.  Patient notified and in agreement NA.  Patient educated on Mindfulness . Patient has completed 85 phase I & II program hours. Currently has completed 10/24 phase III hours . Projected discharge date is 10/30/19. Current discharge plan is Phase TBD.     Psycho-Educational Curriculum  Date Attended  Psycho-Educational Curriculum  Date Attended    8 Dimensions of wellness  8/5-8/9 Grief and Loss  7/29-8/2   Emotional    Stages of grief     Physical    Memorialized      Intellectual    Letters to loved ones      Occupational    Grief group     Spiritual    Loneliness     Environmental    Purpose and Hobbies     Financial    Values     Social    Hobbies     Access to Care  8/12-8/16 Pat      Service Access    Volunteer Work      Pain Management    Experiential Learning      Memory    Value of movement      Physical Wellness   Relationships  9/4-9/6 9/9-9/13   Medication    Self-Love      PAWS    Resentment     Hygiene (Sleep, Physical, etc)    Communication Skills      Emotional Wellbeing  8/19-8/23 Amends      Healthy vs. Unhealthy Feelings   Family      Affirmations   Social Anxieties      Co-Occurring Disorders   Legacy      Anxiety    Support(+ & -)     Depression   Assertive Communication      Grounding   Codependency     Trauma/Victim Identity    Boundaries     MH/CD Acceptance    Defense Mechanisms      Sober Structure  7/5-7/12 9/16-9/20 Relapse Prevention  7/15-7/19 9/23-9/27   Sober support groups    Triggers and High Risk Situations     Needds   Relapse Prevention Plan      Spirituality    Coping Skills      Schedule   Addictive Thoughts     Sobriety Vs. Recovery    Relapse Process      Power of Now    What is Addiction      Truth in life    Impulsive/Compulsive Behaviors      Recovery  Investement    Cross Addiction      Recovery Influences    Early Recovery          Acceptance  8/26-8/30   Educational Videos    Mindfulness     No Kidding Me 2!    Phase III      Anonymous People    Pat  10/8/19     Be the stick  11/5/19     Emotions  10/22   Ike's Story    8 Dimensions/Wellness 10/15/19

## 2021-06-03 NOTE — PROGRESS NOTES
1x1:    The patient and this writer met for 60 minutes to discuss treatment plan and goals. Patient reports he is working on obtaining [place] supplement at this time and is filling out the paperwork for that. Patient reports he is also working on filling out the [place] housing application. He reports he has a couple bills from [employer] that are outstanding and it was identified as an error on our end that the [facility] funding was deleted as a funding source in his appointment desk. Patient is going to follow up with billing on this to ensure his debts do not go to collections. Patient reports stability at this time and attributes it to medication compliance. We will meet again in a couple weeks to discuss treatment progress again.     ROSARIO Baird 11/26/2019 2:25 PM

## 2021-06-03 NOTE — PROGRESS NOTES
Willie Dougherty attended 2 hours of group on 11/19/2019.     The group topic was Maintenance, patient was responsive to topic.     Patient's engagement in the group session: medium     Total number of patients present 6.     Supervising MD: Dr. Sai Olea, [facility]  11/19/2019, 11:53 AM

## 2021-06-03 NOTE — PROGRESS NOTES
Willie Dougherty attended 2 hours of group on 11/5/2019.     The group topic was Be the Stick , patient was responsive to topic.     Patient's engagement in the group session: medium     Total number of patients present 3.     Supervising MD: Dr. Sai Olea, [facility]  11/5/2019, 11:01 AM

## 2021-06-03 NOTE — PROGRESS NOTES
Willie Dougherty attended 2 hours of group on 11/26/2019.     The group topic was holiday relapse prevention, patient was responsive to topic.     Patient's engagement in the group session: medium     Total number of patients present 6.     Supervising MD: Dr. Sai Olea, [facility]  11/26/2019, 2:01 PM

## 2021-06-03 NOTE — PROGRESS NOTES
Weekly Progress Note  Willie Dougherty  1960  [id number]        D) Pt attended 1 groups this week with 0 absences. Patient attended 0 individual sessions this week. Patient is in phase III of SRP.  A) Staff facilitated groups and reviewed tx progress. Assessed for [facility]. R) No VAP needed at this time.   Any significant events, defines as events that impact patients relationship with others inside and outside of treatment: None at this time.   Indicate any changes or monitoring of physical or mental health problems: No emergent concerns      Indicate involvement by any outside supports: The patient identifies weekly that he engages with sober peers and enjoys doing various activities with his support network.   IAPP reviewed and modified as needed. NA  Patient was staffed with Dr. Gibbs and Dahlia Penn [facility] on 11/7/19.   Pt working on the following dimensions:  Dimension #1 - Withdrawal Potential - Risk 0. Patient reports a history of regular alcohol use. He states that he was recently discharged from residential treatment at [place]. Patient denies any alcohol use since being out of treatment. He reports his last use of alcohol was in January 2019. Patient denies any current or past withdrawal symptoms. He shows no physical signs or symptoms of intoxication or withdrawal. Patient is oriented x4  Specific goals from treatment plan addressed this week:   1.The patient goal is to maintain sobriety .    Effectiveness of strategies: Strategies appear to be effective. The patient reports maintained abstinence.      Dimension #2 - Biomedical - Risk 0. Patient denies any current health issues or concerns. He does not currently have a PCP, but appears able to get his medical needs met and addressed as necessary. Patient denies any immediate medical needs or concerns. Patient appears to be in adequate physical health and functioning at this time  Specific goals from treatment plan addressed this week:   1. The patient  goal is to maintain good physical health  Effectiveness of strategies: Strategies appear to be effective. The patient is not endorsing any new or emergent biomedical concerns.      Dimension #3 - Emotional/Behavioral/Cognitive - Risk 1. Patient reports mental health diagnoses of depression and bipolar disorder. Patient does not currently have any mental health services as he was recently discharged from residential treatment. He does endorse that he will be seeking psychiatry services. Patient endorses medication to help manage his mental health symptoms. Patient denies any suicidal or homicidal thoughts or plans. Patient is oriented x4. .  Specific goals from treatment plan addressed this week:   1The patient goal is to manage mental health.  2. Remain medication compliant.    Effectiveness of strategies: Strategies appear to be effective. The patient is reporting stable mental health at this time. He is not reporting any new or emergent concerns related to his mental health. He did report that he is working on development of emotional regulation skills and talking more often about his feelings. This has been noticed in the group setting as well as he has been opening up more often in the group setting.     Dimension #4 - Treatment Acceptance/Resistance - Risk 1. Patient reports that he does feel that he is required to be here for treatment, and he would have liked a less intensive program. Patient does verbalize a desire to change his substance use at this time. Patient does appear ambivalent about his need for continued treatment at this time. He does verbalize willingness to follow treatment recommendations at this time. Patient was calm, cooperative, and appropriately behaved throughout this appointment  Specific goals from treatment plan addressed this week:   1.The patient goal is to follow through with intentions to treat chemical dependency concerns.   Effectiveness of strategies: Strategies appear to be  effective. Patient is now in phase III of the SRP. Over the last week the patient has increased his group engagement and was an active participant. He has been showing more emotions in the group setting. No changes.      Dimension #5 - Relapse Potential - Risk 3. Patient reports that he just completed his first treatment. He therefore appears to have some knowledge of addiction, but could benefit from additional education. Patient appears to lack some insight into his use and the impact it had on his life as he appears to minimize his need for treatment and change despite the negative consequences he has experienced. Patient appears to lack the necessary coping skills at this time to help him prevent relapse as he reports just having gotten out of residential treatment, and minimal periods of sobriety recently. Patient therefore appears to currently be at an increased risk of relapse  Specific goals from treatment plan addressed this week:   1. The patient goal is to identify and implement coping skills.   Effectiveness of strategies: Strategies appear to be effective. The patient reported no triggers or urges over the last week.      Dimension #6 - Recovery Environment - Risk 3. Patient is currently unemployed, and so appears to lack structure for his daily life. He was able to identify hobbies and activities he enjoys, but it did not appear that he was actively engaging in them prior to going to treatment. Patient reports that he is currently living at a sober house, and so has some support from his living environment. Patient reports that his family is supportive of his sobriety, and about half of his friends are as well. Some of the patient's friends could increase his risk of relapse as they drank together historically. Patient endorses pending legal consequences from a DWI that happened in August 2018. He denies any other current legal involvement.  Specific goals from treatment plan addressed this week:    1.The patient goal is to develop sober structured activities.  Effectiveness of strategies: Strategies appear to be effective. The patient continues to engage with sober peers and sober support. He has been actively seeking out sober support groups and sober peers. Patient is handling his legal concerns and has no concerns related to that.     T) Treatment plan updated no.  Patient notified and in agreement NA.  Patient educated on Processing. Patient has completed 85 phase I & II program hours. Currently has completed 16/24 phase III hours . Projected discharge date is 10/30/19. Current discharge plan is Phase TBD.     Psycho-Educational Curriculum  Date Attended  Psycho-Educational Curriculum  Date Attended    8 Dimensions of wellness  8/5-8/9 Grief and Loss  7/29-8/2   Emotional    Stages of grief     Physical    Memorialized      Intellectual    Letters to loved ones      Occupational    Grief group     Spiritual    Loneliness     Environmental    Purpose and Hobbies     Financial    Values     Social    Hobbies     Access to Care  8/12-8/16 Pat      Service Access    Volunteer Work      Pain Management    Experiential Learning      Memory    Value of movement      Physical Wellness   Relationships  9/4-9/6 9/9-9/13   Medication    Self-Love      PAWS    Resentment     Hygiene (Sleep, Physical, etc)    Communication Skills      Emotional Wellbeing  8/19-8/23 Amends      Healthy vs. Unhealthy Feelings   Family      Affirmations   Social Anxieties      Co-Occurring Disorders   Legacy      Anxiety    Support(+ & -)     Depression   Assertive Communication      Grounding   Codependency     Trauma/Victim Identity    Boundaries     MH/CD Acceptance    Defense Mechanisms      Sober Structure  7/5-7/12 9/16-9/20 Relapse Prevention  7/15-7/19 9/23-9/27   Sober support groups    Triggers and High Risk Situations     Needds   Relapse Prevention Plan      Spirituality    Coping Skills      Schedule   Addictive Thoughts      Sobriety Vs. Recovery    Relapse Process      Power of Now    What is Addiction      Truth in life    Impulsive/Compulsive Behaviors      Recovery Investement    Cross Addiction      Recovery Influences    Early Recovery          Acceptance  8/26-8/30   Educational Videos    Mindfulness     No Kidding Me 2!    Phase III      Anonymous People    Pat  10/8/19     Be the stick  11/5/19     Emotions  10/22     Processing  11/12   Ike's Story    8 Dimensions/Wellness 10/15/19

## 2021-06-03 NOTE — PROGRESS NOTES
Weekly Progress Note  Willie Dougherty  1960  [id number]        D) Pt attended 1 groups this week with 0 absences. Patient attended 0 individual sessions this week. Patient is in phase III of SRP.  A) Staff facilitated groups and reviewed tx progress. Assessed for [facility]. R) No VAP needed at this time.   Any significant events, defines as events that impact patients relationship with others inside and outside of treatment: None at this time.   Indicate any changes or monitoring of physical or mental health problems: No emergent concerns      Indicate involvement by any outside supports: The patient identifies weekly that he engages with sober peers and enjoys doing various activities with his support network.   IAPP reviewed and modified as needed. NA  Patient was staffed with Dr. Gibbs and Dahlia Penn [facility] on 11/14/19.   Pt working on the following dimensions:  Dimension #1 - Withdrawal Potential - Risk 0. Patient reports a history of regular alcohol use. He states that he was recently discharged from residential treatment at [place]. Patient denies any alcohol use since being out of treatment. He reports his last use of alcohol was in January 2019. Patient denies any current or past withdrawal symptoms. He shows no physical signs or symptoms of intoxication or withdrawal. Patient is oriented x4  Specific goals from treatment plan addressed this week:   1.The patient goal is to maintain sobriety .    Effectiveness of strategies: Strategies appear to be effective. No use reported over the last week.     Dimension #2 - Biomedical - Risk 0. Patient denies any current health issues or concerns. He does not currently have a PCP, but appears able to get his medical needs met and addressed as necessary. Patient denies any immediate medical needs or concerns. Patient appears to be in adequate physical health and functioning at this time  Specific goals from treatment plan addressed this week:   1. The patient goal is  to maintain good physical health  Effectiveness of strategies: Strategies appear to be effective. At this time there are no new or emergent biomedical concerns. Patient is in stable health. He did provide this writer with some medical bills related to his treatment here that will be checked by our  and reviewed to determine patient need to pay due to him having supplemental funding from [facility]      Dimension #3 - Emotional/Behavioral/Cognitive - Risk 1. Patient reports mental health diagnoses of depression and bipolar disorder. Patient does not currently have any mental health services as he was recently discharged from residential treatment. He does endorse that he will be seeking psychiatry services. Patient endorses medication to help manage his mental health symptoms. Patient denies any suicidal or homicidal thoughts or plans. Patient is oriented x4. .  Specific goals from treatment plan addressed this week:   1The patient goal is to manage mental health.  2. Remain medication compliant.    Effectiveness of strategies: Strategies appear to be effective. The patient continues to report stable mental health. He continues to see Dr. Gibbs in the [facility] and finds benefit from their appointments. The patient reports he is managing he mental health well at this time and is finding more reasons to find vin and not feeling stiff in his recovery from both substance use and mental health concerns.     Dimension #4 - Treatment Acceptance/Resistance - Risk 1. Patient reports that he does feel that he is required to be here for treatment, and he would have liked a less intensive program. Patient does verbalize a desire to change his substance use at this time. Patient does appear ambivalent about his need for continued treatment at this time. He does verbalize willingness to follow treatment recommendations at this time. Patient was calm, cooperative, and appropriately behaved throughout this  appointment  Specific goals from treatment plan addressed this week:   1.The patient goal is to follow through with intentions to treat chemical dependency concerns.   Effectiveness of strategies: Strategies appear to be effective. Patient is now in phase III of the SRP. The patient is a good group participant who is active and engaged. He is in the action stage of change.       Dimension #5 - Relapse Potential - Risk 3. Patient reports that he just completed his first treatment. He therefore appears to have some knowledge of addiction, but could benefit from additional education. Patient appears to lack some insight into his use and the impact it had on his life as he appears to minimize his need for treatment and change despite the negative consequences he has experienced. Patient appears to lack the necessary coping skills at this time to help him prevent relapse as he reports just having gotten out of residential treatment, and minimal periods of sobriety recently. Patient therefore appears to currently be at an increased risk of relapse  Specific goals from treatment plan addressed this week:   1. The patient goal is to identify and implement coping skills.   Effectiveness of strategies: Strategies appear to be effective. The patient identified no urges or triggers at this time which he identified as something that was odd due to the stressors he was experiencing.      Dimension #6 - Recovery Environment - Risk 3. Patient is currently unemployed, and so appears to lack structure for his daily life. He was able to identify hobbies and activities he enjoys, but it did not appear that he was actively engaging in them prior to going to treatment. Patient reports that he is currently living at a sober house, and so has some support from his living environment. Patient reports that his family is supportive of his sobriety, and about half of his friends are as well. Some of the patient's friends could increase his  risk of relapse as they drank together historically. Patient endorses pending legal consequences from a DWI that happened in August 2018. He denies any other current legal involvement.  Specific goals from treatment plan addressed this week:   1.The patient goal is to develop sober structured activities.  Effectiveness of strategies: Strategies appear to be effective. The patient continues to engage with sober peers and sober support. He has been actively seeking out sober support groups and sober peers. Patient is handling his legal concerns at this time. He is looking for long term [facility] housing and has been given information to complete this.     T) Treatment plan updated no.  Patient notified and in agreement NA.  Patient educated on maintenance. Patient has completed 85 phase I & II program hours. Currently has completed 18/24 phase III hours . Projected discharge date is 10/30/19. Current discharge plan is Phase TBD.     Psycho-Educational Curriculum  Date Attended  Psycho-Educational Curriculum  Date Attended    8 Dimensions of wellness  8/5-8/9 Grief and Loss  7/29-8/2   Emotional    Stages of grief     Physical    Memorialized      Intellectual    Letters to loved ones      Occupational    Grief group     Spiritual    Loneliness     Environmental    Purpose and Hobbies     Financial    Values     Social    Hobbies     Access to Care  8/12-8/16 Pat      Service Access    Volunteer Work      Pain Management    Experiential Learning      Memory    Value of movement      Physical Wellness   Relationships  9/4-9/6 9/9-9/13   Medication    Self-Love      PAWS    Resentment     Hygiene (Sleep, Physical, etc)    Communication Skills      Emotional Wellbeing  8/19-8/23 Amends      Healthy vs. Unhealthy Feelings   Family      Affirmations   Social Anxieties      Co-Occurring Disorders   Legacy      Anxiety    Support(+ & -)     Depression   Assertive Communication      Grounding   Codependency     Trauma/Victim  Identity    Boundaries     MH/CD Acceptance    Defense Mechanisms      Sober Structure  7/5-7/12 9/16-9/20 Relapse Prevention  7/15-7/19 9/23-9/27   Sober support groups    Triggers and High Risk Situations     Needds   Relapse Prevention Plan      Spirituality    Coping Skills      Schedule   Addictive Thoughts     Sobriety Vs. Recovery    Relapse Process      Power of Now    What is Addiction      Truth in life    Impulsive/Compulsive Behaviors      Recovery Investement    Cross Addiction      Recovery Influences    Early Recovery          Acceptance  8/26-8/30   Educational Videos    Mindfulness     No Kidding Me 2!    Phase III      Anonymous People    Pat  10/8/19     Be the stick  11/5/19     Emotions  10/22     Processing  11/12     Maintenance  11/19/19   Ike's Story    8 Dimensions/Wellness 10/15/19

## 2021-06-03 NOTE — TELEPHONE ENCOUNTER
Leah called about med:  lamoTRIgine (LAMICTAL) 25 MG tablet  Plan was to increase dose to 50 bur he had to re-[facility] last appointment   Next appt [facility] 11/19  He does not want to wait till 11/19 to increase   Feels he needs the add for his anxiety.   Pharmacy:   [id number] - [place], [place] - [address]    Please contact client re: above

## 2021-06-03 NOTE — PROGRESS NOTES
Willie Dougherty attended 2 hours of group on 11/12/2019.     The group topic was Processing, patient was responsive to topic.     Patient's engagement in the group session: high     Total number of patients present 6.     Supervising MD: Dr. Sai Olea, [facility]  11/12/2019, 11:49 AM

## 2021-06-03 NOTE — TELEPHONE ENCOUNTER
Pt called to discuss tooth pain from a broken tooth for over a month. Pt stated the pain is 10 and wanted to know if the ED will pull the tooth. Pt has taken tylenol for pain but not helping. Per protocol home care suggestions were reviewed and understood. Plan is to go to ED for pain control explained that tooth removal not generally done by ED and would need to be seen by a dentist but the pain can be addressed. Pt agreed with plan    Sydnee Pelayo RN [facility] Triage/Medication Refill      Reason for Disposition    [1] SEVERE pain (e.g., excruciating, unable to do any normal activities) AND [2] not improved 2 hours after pain medicine    Protocols used: TOOTHACHE-A-AH

## 2021-06-04 VITALS
DIASTOLIC BLOOD PRESSURE: 91 MMHG | SYSTOLIC BLOOD PRESSURE: 143 MMHG | HEART RATE: 107 BPM | BODY MASS INDEX: 27.48 KG/M2 | HEIGHT: 66 IN | WEIGHT: 171 LBS

## 2021-06-04 NOTE — TELEPHONE ENCOUNTER
Patient:   VERONICA MERCEDES            MRN: [id number]            FIN: [id number]               ALEX ESTRADA     Date of Last Office Visit: 11/19/2019  Date of Next Office Visit: none, will have schedulers set him up for an appointment as soon as possible with Dr. Gibbs.  No shows since last visit: none  Cancellations since last visit: 12/17/2019  ED visits since last visit:  none  Medication Hydroxyzine Pamoate 50 mg date last ordered: 08/21/2019  Qty: 90  Refills: 0  Lapse in therapy greater than 7 days: appears yes, but prn  Medication refill request verified as identical to current order: yes  Result of Last DAM, VPA, Li+ Level, CBC, or Carbamazepine Level (at or since last visit): Negative on 11/19/2019     [] Medication refilled per [facility] M-1.   [x] Medication unable to be refilled by RN due to criteria not met as indicated below:     []Eligibility - not seen in last year    [x]Supervision - no future appointment    [x]Compliance     []Verification - order discrepancy    []Controlled Medication    []Medication not included in RN Protocol    []90 - day supply request    []Other     Current Medication list:    Willie Dougherty    (MRN [id number])   Your Current Medications Are     buPROPion (WELLBUTRIN XL) 300 MG 24 hr tablet TAKE 1 TABLET(300 MG) BY MOUTH DAILY   hydrOXYzine pamoate (VISTARIL) 50 MG capsule Take 1 capsule (50 mg total) by mouth 3 (three) times a day as needed for anxiety.   lamoTRIgine (LAMICTAL) 100 MG tablet Take 1 tablet (100 mg total) by mouth daily.       Medication Plan of Care at last office visit with MD/CNP:    Diagnoses/Plan:     1. Alcohol use disorder, severe, dependence (H)  -In sustained recovery since January 2019.  Projected completion of IOP at [facility] in January 1.  -Actively involved in the recovery community, support groups, genuinely interested in sobriety.  - Drug Abuse 1+, Urine  - Ethyl Glucuronide and Ethyl Sulfate, Urine, Quantitative ([facility] ETG/S)     2. Bipolar I disorder, most recent episode depressed (H)  -Mood is stabilizing with Lamictal.  Continue tapering up  Lamictal with increasing the dose 200 mg daily for 2 weeks and then 200 mg daily afterwards  - lamoTRIgine (LAMICTAL) 100 MG tablet; Take 1 tablet (100 mg total) by mouth daily.  Dispense: 60 tablet; Refill: 2   -Continue Wellbutrin XL without change. Wellbutrin is typically not indicated in bipolar disorder due to its tendency to provoke manic episodes however due to the long-term, intake of this medication by the patient and doing well on it I would recommend to continue the medication. Patient has shown significant improvement with it.

## 2021-06-04 NOTE — PROGRESS NOTES
Weekly Progress Note  Willie Dougherty  1960  [id number]        D) Pt attended 1 groups this week with 0 absences (Pt attended 1 hour of group). Patient attended 0 individual sessions this week. Patient is in phase III of SRP.  A) Staff facilitated groups and reviewed tx progress. Assessed for [facility]. R) No VAP needed at this time.   Any significant events, defines as events that impact patients relationship with others inside and outside of treatment: None at this time.   Indicate any changes or monitoring of physical or mental health problems: No emergent concerns      Indicate involvement by any outside supports: The patient identifies weekly that he engages with sober peers and enjoys doing various activities with his support network.   IAPP reviewed and modified as needed. NA  Patient was last staffed with Dr. Gibbs and Dahlia [facility] on 12/12/19.   Pt working on the following dimensions:  Dimension #1 - Withdrawal Potential - Risk 0. Patient reports a history of regular alcohol use. He states that he was recently discharged from residential treatment at [place]. Patient denies any alcohol use since being out of treatment. He reports his last use of alcohol was in January 2019. Patient denies any current or past withdrawal symptoms. He shows no physical signs or symptoms of intoxication or withdrawal. Patient is oriented x4  Specific goals from treatment plan addressed this week:   1.The patient goal is to maintain sobriety .    Effectiveness of strategies: Strategies appear to be effective. The patient did not endorse any use over the last week.      Dimension #2 - Biomedical - Risk 0. Patient denies any current health issues or concerns. He does not currently have a PCP, but appears able to get his medical needs met and addressed as necessary. Patient denies any immediate medical needs or concerns. Patient appears to be in adequate physical health and functioning at this time  Specific goals from  treatment plan addressed this week:   1. The patient goal is to maintain good physical health  Effectiveness of strategies: Strategies appear to be effective. The patient is working on obtaining a supplemental policy for his insurance through [place]. He is filling out paperwork and was told that should he struggle he can set up an appointment. Last week, Patient reported to the group that he had taken probably more Tylenol than he should have after his tooth extraction on 12/4, and that he consulted with the [facility] nurseline, who connected him w/Poison Control, because of concerns about possibly overdosing.  (Patient states he now understands about the risks of taking too much Tylenol, and this led to a discussion w/all group members about that topic) This week the patient reports good health and no new concerns     Dimension #3 - Emotional/Behavioral/Cognitive - Risk 1. Patient reports mental health diagnoses of depression and bipolar disorder. Patient does not currently have any mental health services as he was recently discharged from residential treatment. He does endorse that he will be seeking psychiatry services. Patient endorses medication to help manage his mental health symptoms. Patient denies any suicidal or homicidal thoughts or plans. Patient is oriented x4. .  Specific goals from treatment plan addressed this week:   1The patient goal is to manage mental health.  2. Remain medication compliant.    Effectiveness of strategies: Strategies appear to be effective. The patient is medication compliant at this time and attributes that to his mental health stability. The patient reports he agreed to family therapy with his parents and siblings - the intial session that had been scheduled for last week was then cancelled.  Patient reports feeling relieved and yet disappointed.  He's glad that they were able to reschedule for a session before Emily, and the initial family session is now scheduled for tomorrow,  12/18/19.     Dimension #4 - Treatment Acceptance/Resistance - Risk 1. Patient reports that he does feel that he is required to be here for treatment, and he would have liked a less intensive program. Patient does verbalize a desire to change his substance use at this time. Patient does appear ambivalent about his need for continued treatment at this time. He does verbalize willingness to follow treatment recommendations at this time. Patient was calm, cooperative, and appropriately behaved throughout this appointment  Specific goals from treatment plan addressed this week:   1.The patient goal is to follow through with intentions to treat chemical dependency concerns.   Effectiveness of strategies: Strategies appear to be effective. Patient is now in phase III of the SRP. The patient is a good group participant who is active and engaged. He is in the action stage of change.  Today, Patient missed the 1st hour of group after he answered a phone call from an unfamiliar number. (See below)     Dimension #5 - Relapse Potential - Risk 3. Patient reports that he just completed his first treatment. He therefore appears to have some knowledge of addiction, but could benefit from additional education. Patient appears to lack some insight into his use and the impact it had on his life as he appears to minimize his need for treatment and change despite the negative consequences he has experienced. Patient appears to lack the necessary coping skills at this time to help him prevent relapse as he reports just having gotten out of residential treatment, and minimal periods of sobriety recently. Patient therefore appears to currently be at an increased risk of relapse  Specific goals from treatment plan addressed this week:   1. The patient goal is to identify and implement coping skills.   Effectiveness of strategies: Strategies appear to be effective. The patient reports some thoughts/memories of drinking in the past week,  "while with others at a hotel \"staycation\", no real urges or triggers. He reports coping by having a NA cranberry juice mixture served in a wine glass, which is a solution that works for him.     Dimension #6 - Recovery Environment - Risk 3. Patient is currently unemployed, and so appears to lack structure for his daily life. He was able to identify hobbies and activities he enjoys, but it did not appear that he was actively engaging in them prior to going to treatment. Patient reports that he is currently living at a sober house, and so has some support from his living environment. Patient reports that his family is supportive of his sobriety, and about half of his friends are as well. Some of the patient's friends could increase his risk of relapse as they drank together historically. Patient endorses pending legal consequences from a DWI that happened in August 2018. He denies any other current legal involvement.  Specific goals from treatment plan addressed this week:   1.The patient goal is to develop sober structured activities.  Effectiveness of strategies: Strategies appear to be effective. The patient continues to engage with sober peers and sober support. He reports attending 2 AA meetings in the past week. Patient is handling his legal concerns at this time. He is looking for long term [facility] housing and has been given information to complete this. The patient has attended [employer] recently and found it to be enjoyable.   Today, Patient received a phone call from the \"[employer] Security [employer]\" accusing him of a violent crime and stating that the police were on the way to arrest him.  He reports feeling panicked by the call and that he did give some personal info to the caller.  Patient then went to the office of one of the [facility] to ask for a private office to continue his phone call.  The counselor was able to help him see that this wasn't a legitimate phone call.  Patient missed the 1st hour of the RM " group due to these interactions.     T) Treatment plan updated no.  Patient notified and in agreement NA.  Patient educated on Emotions/Holiday prep. Patient has completed 85 phase I & II program hours. Currently has completed 24/24 phase III hours . Projected discharge date is TBD. Current discharge plan is TBD.     Psycho-Educational Curriculum  Date Attended  Psycho-Educational Curriculum  Date Attended    8 Dimensions of wellness  8/5-8/9 Grief and Loss  7/29-8/2   Emotional    Stages of grief     Physical    Memorialized      Intellectual    Letters to loved ones      Occupational    Grief group     Spiritual    Loneliness     Environmental    Purpose and Hobbies     Financial    Values     Social    Hobbies     Access to Care  8/12-8/16 Pat      Service Access    Volunteer Work      Pain Management    Experiential Learning      Memory    Value of movement      Physical Wellness   Relationships  9/4-9/6 9/9-9/13   Medication    Self-Love      PAWS    Resentment     Hygiene (Sleep, Physical, etc)    Communication Skills      Emotional Wellbeing  8/19-8/23 Amends      Healthy vs. Unhealthy Feelings   Family      Affirmations   Social Anxieties      Co-Occurring Disorders   Legacy      Anxiety    Support(+ & -)     Depression   Assertive Communication      Grounding   Codependency     Trauma/Victim Identity    Boundaries     MH/CD Acceptance    Defense Mechanisms      Sober Structure  7/5-7/12 9/16-9/20 Relapse Prevention  7/15-7/19 9/23-9/27   Sober support groups    Triggers and High Risk Situations     Needds   Relapse Prevention Plan      Spirituality    Coping Skills      Schedule   Addictive Thoughts     Sobriety Vs. Recovery    Relapse Process      Power of Now    What is Addiction      Truth in life    Impulsive/Compulsive Behaviors      Recovery Investement    Cross Addiction      Recovery Influences    Early Recovery          Acceptance  8/26-8/30   Educational Videos    Mindfulness     No Kidding Me  2!    Phase III          Holiday relapse prevention  11/26   Anonymous People    Pat  10/8/19       Be the stick  11/5/19       Emotions  10/22, 12/17       Processing  11/12, 12/10       Maintenance  11/19/19

## 2021-06-04 NOTE — PROGRESS NOTES
Weekly Progress Note  Willie Dougherty  1960  [id number]        D) Pt attended 1 group this week with 0 absences. Patient attended 0 individual sessions this week. Patient is in phase III of SRP.  A) Staff facilitated groups and reviewed tx progress. Assessed for [facility]. R) No VAP needed at this time.   Any significant events, defines as events that impact patients relationship with others inside and outside of treatment: None at this time.   Indicate any changes or monitoring of physical or mental health problems: No emergent concerns      Indicate involvement by any outside supports: The patient identifies weekly that he engages with sober peers and enjoys doing various activities with his support network.   IAPP reviewed and modified as needed. NA  Patient was last staffed with Dr. Gibbs and Dahlia Penn [facility] on 12/19/19.   Pt working on the following dimensions:  Dimension #1 - Withdrawal Potential - Risk 0. Patient reports a history of regular alcohol use. He states that he was recently discharged from residential treatment at [place]. Patient denies any alcohol use since being out of treatment. He reports his last use of alcohol was in January 2019. Patient denies any current or past withdrawal symptoms. He shows no physical signs or symptoms of intoxication or withdrawal. Patient is oriented x4  Specific goals from treatment plan addressed this week:   1.The patient goal is to maintain sobriety .    Effectiveness of strategies: Strategies appear to be effective. Patient reports no use over the last week.      Dimension #2 - Biomedical - Risk 0. Patient denies any current health issues or concerns. He does not currently have a PCP, but appears able to get his medical needs met and addressed as necessary. Patient denies any immediate medical needs or concerns. Patient appears to be in adequate physical health and functioning at this time  Specific goals from treatment plan addressed this week:   1. The  patient goal is to maintain good physical health  Effectiveness of strategies: Strategies appear to be effective. The patient is working on obtaining a supplemental policy for his insurance through [place]. He is filling out paperwork and was told that should he struggle he can set up an appointment. 2 weeks ago, Patient reported to the group that he had taken probably more Tylenol than he should have after his tooth extraction on 12/4, and that he consulted with the [facility] nurseline, who connected him w/Poison Control, because of concerns about possibly overdosing.  (Patient states he now understands about the risks of taking too much Tylenol, and this led to a discussion w/all group members about that topic) This week the patient reports good health and no new concerns     Dimension #3 - Emotional/Behavioral/Cognitive - Risk 1. Patient reports mental health diagnoses of depression and bipolar disorder. Patient does not currently have any mental health services as he was recently discharged from residential treatment. He does endorse that he will be seeking psychiatry services. Patient endorses medication to help manage his mental health symptoms. Patient denies any suicidal or homicidal thoughts or plans. Patient is oriented x4. .  Specific goals from treatment plan addressed this week:   1.The patient goal is to manage mental health.  2. Remain medication compliant.    Effectiveness of strategies: Strategies appear to be effective. The patient is medication compliant at this time and attributes his mental health stability to that. The patient had previously reported he agreed to family therapy with his parents and siblings - the intial session that had been scheduled was then cancelled, then rescheduled for 12/18. This week, Patient reports that the family therapy session occurred.  He was apprehensive beforehand, but was overall pleased with how it went, and happy that he was able to be articulate get across the  "messages he wanted to convey.  He recognizes that his father needs more time to process what was said.     Dimension #4 - Treatment Acceptance/Resistance - Risk 1. Patient reports that he does feel that he is required to be here for treatment, and he would have liked a less intensive program. Patient does verbalize a desire to change his substance use at this time. Patient does appear ambivalent about his need for continued treatment at this time. He does verbalize willingness to follow treatment recommendations at this time. Patient was calm, cooperative, and appropriately behaved throughout this appointment  Specific goals from treatment plan addressed this week:   1.The patient goal is to follow through with intentions to treat chemical dependency concerns.   Effectiveness of strategies: Strategies appear to be effective. Patient is now in phase III of the SRP. The patient participates and is actively engaged in group processes. He is in the action stage of change.       Dimension #5 - Relapse Potential - Risk 3. Patient reports that he just completed his first treatment. He therefore appears to have some knowledge of addiction, but could benefit from additional education. Patient appears to lack some insight into his use and the impact it had on his life as he appears to minimize his need for treatment and change despite the negative consequences he has experienced. Patient appears to lack the necessary coping skills at this time to help him prevent relapse as he reports just having gotten out of residential treatment, and minimal periods of sobriety recently. Patient therefore appears to currently be at an increased risk of relapse  Specific goals from treatment plan addressed this week:   1. The patient goal is to identify and implement coping skills.   Effectiveness of strategies: Strategies appear to be effective. The patient reports having a \"huge urge\" to drink last night (12/23) that was associated with " "the stresses of the holidays. He said it was much different that the fond thoughts/memories of drinking that he sometimes has. He reports it was very difficult, yet he's happy and proud that he used the skills he's learned in treatment to avoid use - specifically, remembering that the urge would pass in a short period of time if he waited it out, and playing the tape forward of what would happen if he did drink.     Dimension #6 - Recovery Environment - Risk 3. Patient is currently unemployed, and so appears to lack structure for his daily life. He was able to identify hobbies and activities he enjoys, but it did not appear that he was actively engaging in them prior to going to treatment. Patient reports that he is currently living at a sober house, and so has some support from his living environment. Patient reports that his family is supportive of his sobriety, and about half of his friends are as well. Some of the patient's friends could increase his risk of relapse as they drank together historically. Patient endorses pending legal consequences from a DWI that happened in August 2018. He denies any other current legal involvement.  Specific goals from treatment plan addressed this week:   1.The patient goal is to develop sober structured activities.  Effectiveness of strategies: Strategies appear to be effective. The patient continues to engage with sober peers and sober support. He reports attending 3 AA meetings in the past week. Patient is handling his legal concerns at this time. He is looking for long term [facility] housing and has been given information to complete this. The patient has attended [employer] recently and found it to be enjoyable.   Last week, while waiting for group to start, Patient received a phone call from the \"[employer]\" accusing him of a violent crime and stating that the police were on the way to arrest him.  He reports feeling panicked by the call and that he did give " some personal info to the caller.  Patient then went to the office of one of the [facility] to ask for a private office to continue his phone call.  The counselor was able to help him see that this wasn't a legitimate phone call.  Patient missed the 1st hour of the RM group due to these interactions. This week, Patient did not report any concerns about this incident.       T) Treatment plan updated no.  Patient notified and in agreement NA.  Patient educated on holiday challenges/processing. Patient has completed 85 phase I & II program hours. Currently has completed 25/24 phase III hours . Projected discharge date is TBD. Current discharge plan is [facility].     Psycho-Educational Curriculum  Date Attended  Psycho-Educational Curriculum  Date Attended    8 Dimensions of wellness  8/5-8/9 Grief and Loss  7/29-8/2   Emotional    Stages of grief     Physical    Memorialized      Intellectual    Letters to loved ones      Occupational    Grief group     Spiritual    Loneliness     Environmental    Purpose and Hobbies     Financial    Values     Social    Hobbies     Access to Care  8/12-8/16 Pat      Service Access    Volunteer Work      Pain Management    Experiential Learning      Memory    Value of movement      Physical Wellness   Relationships  9/4-9/6 9/9-9/13   Medication    Self-Love      PAWS    Resentment     Hygiene (Sleep, Physical, etc)    Communication Skills      Emotional Wellbeing  8/19-8/23 Amends      Healthy vs. Unhealthy Feelings   Family      Affirmations   Social Anxieties      Co-Occurring Disorders   Legacy      Anxiety    Support(+ & -)     Depression   Assertive Communication      Grounding   Codependency     Trauma/Victim Identity    Boundaries     MH/CD Acceptance    Defense Mechanisms      Sober Structure  7/5-7/12 9/16-9/20 Relapse Prevention  7/15-7/19 9/23-9/27   Sober support groups    Triggers and High Risk Situations     Needds   Relapse Prevention Plan      Spirituality     Coping Skills      Schedule   Addictive Thoughts     Sobriety Vs. Recovery    Relapse Process      Power of Now    What is Addiction      Truth in life    Impulsive/Compulsive Behaviors      Recovery Investement    Cross Addiction      Recovery Influences    Early Recovery          Acceptance  8/26-8/30   Educational Videos    Mindfulness     No Kidding Me 2!    Phase III          Holiday relapse prevention  11/26, 12/24   Anonymous People    Pat  10/8/19       Be the stick  11/5/19       Emotions  10/22, 12/17       Processing  11/12, 12/10, 12/24       Maintenance  11/19/19

## 2021-06-04 NOTE — PROGRESS NOTES
Willie Dougherty attended 2 hours of group on 12/24/2019.     The group topic was processing, holiday challenges, patient was responsive to topic.     Patient's engagement in the group session: high     Total number of patients present 5.     Counselor(s) present: Maranda Saab, [facility]    Supervising MD: Dr. Brunner Margaret Berry, ROSARIO  12/24/2019, 10:44 AM

## 2021-06-04 NOTE — PROGRESS NOTES
Weekly Progress Note  Willie Dougherty  1960  [id number]        D) Pt attended 1 group this week with 0 absences. Patient attended 0 individual sessions this week. Patient is in phase III of SRP.  A) Staff facilitated groups and reviewed tx progress. Assessed for [facility]. R) No VAP needed at this time.   Any significant events, defines as events that impact patients relationship with others inside and outside of treatment: None at this time.   Indicate any changes or monitoring of physical or mental health problems: No emergent concerns      Indicate involvement by any outside supports: The patient identifies weekly that he engages with sober peers and enjoys doing various activities with his support network.   IAPP reviewed and modified as needed. NA  Patient was last staffed with Dr. Gibbs and Dahlia Penn [facility] on 12/26/19.   Pt working on the following dimensions:  Dimension #1 - Withdrawal Potential - Risk 0. Patient reports a history of regular alcohol use. He states that he was recently discharged from residential treatment at [facility]. Patient denies any alcohol use since being out of treatment. He reports his last use of alcohol was in January 2019. Patient denies any current or past withdrawal symptoms. He shows no physical signs or symptoms of intoxication or withdrawal. Patient is oriented x4  Specific goals from treatment plan addressed this week:   1.The patient goal is to maintain sobriety .    Effectiveness of strategies: Strategies appear to be effective. No use endorsed over the last week.      Dimension #2 - Biomedical - Risk 0. Patient denies any current health issues or concerns. He does not currently have a PCP, but appears able to get his medical needs met and addressed as necessary. Patient denies any immediate medical needs or concerns. Patient appears to be in adequate physical health and functioning at this time  Specific goals from treatment plan addressed this week:   1. The patient  goal is to maintain good physical health  Effectiveness of strategies: Strategies appear to be effective. The patient is working on obtaining a supplemental policy for his insurance through [place]. He is filling out paperwork and was told that should he struggle he can set up an appointment. 2 weeks ago, Patient reported to the group that he had taken probably more Tylenol than he should have after his tooth extraction on 12/4, and that he consulted with the [facility] nurseline, who connected him w/Poison Control, because of concerns about possibly overdosing.  (Patient states he now understands about the risks of taking too much Tylenol, and this led to a discussion w/all group members about that topic). At this time the patient is not reporting any new or emergent biomedical concerns.      Dimension #3 - Emotional/Behavioral/Cognitive - Risk 1. Patient reports mental health diagnoses of depression and bipolar disorder. Patient does not currently have any mental health services as he was recently discharged from residential treatment. He does endorse that he will be seeking psychiatry services. Patient endorses medication to help manage his mental health symptoms. Patient denies any suicidal or homicidal thoughts or plans. Patient is oriented x4. .  Specific goals from treatment plan addressed this week:   1.The patient goal is to manage mental health.  2. Remain medication compliant.    Effectiveness of strategies: Strategies appear to be effective. The patient is medication compliant at this time and attributes his mental health stability to that. The patient is not reporting any new or emergent emotional concerns at this time.     Dimension #4 - Treatment Acceptance/Resistance - Risk 1. Patient reports that he does feel that he is required to be here for treatment, and he would have liked a less intensive program. Patient does verbalize a desire to change his substance use at this time. Patient does appear ambivalent  about his need for continued treatment at this time. He does verbalize willingness to follow treatment recommendations at this time. Patient was calm, cooperative, and appropriately behaved throughout this appointment  Specific goals from treatment plan addressed this week:   1.The patient goal is to follow through with intentions to treat chemical dependency concerns.   Effectiveness of strategies: Strategies appear to be effective. Patient is now in phase III of the SRP. The patient participates and is actively engaged in group processes. He is in the action stage of change. No change.      Dimension #5 - Relapse Potential - Risk 3. Patient reports that he just completed his first treatment. He therefore appears to have some knowledge of addiction, but could benefit from additional education. Patient appears to lack some insight into his use and the impact it had on his life as he appears to minimize his need for treatment and change despite the negative consequences he has experienced. Patient appears to lack the necessary coping skills at this time to help him prevent relapse as he reports just having gotten out of residential treatment, and minimal periods of sobriety recently. Patient therefore appears to currently be at an increased risk of relapse  Specific goals from treatment plan addressed this week:   1. The patient goal is to identify and implement coping skills.   Effectiveness of strategies: Strategies appear to be effective. The patient is reporting no urges or triggers over the last week. He reports he has been implementing skills and feels they are productive.      Dimension #6 - Recovery Environment - Risk 3. Patient is currently unemployed, and so appears to lack structure for his daily life. He was able to identify hobbies and activities he enjoys, but it did not appear that he was actively engaging in them prior to going to treatment. Patient reports that he is currently living at a sober  house, and so has some support from his living environment. Patient reports that his family is supportive of his sobriety, and about half of his friends are as well. Some of the patient's friends could increase his risk of relapse as they drank together historically. Patient endorses pending legal consequences from a DWI that happened in August 2018. He denies any other current legal involvement.  Specific goals from treatment plan addressed this week:   1.The patient goal is to develop sober structured activities.  Effectiveness of strategies: Strategies appear to be effective. The patient continues to engage with sober peers and sober support. He reports attending 3 AA meetings in the past week. Patient is handling his legal concerns at this time. He is looking for long term [facility] housing and has been given information to complete this. The patient has attended [employer] recently and found it to be enjoyable. The patient reports continued engagement in sober support groups.       T) Treatment plan updated no.  Patient notified and in agreement NA.  Patient educated on processing. Patient has completed 85 phase I & II program hours. Currently has completed 28/24 phase III hours . Projected discharge date is TBD. Current discharge plan is community resources.     Psycho-Educational Curriculum  Date Attended  Psycho-Educational Curriculum  Date Attended    8 Dimensions of wellness  8/5-8/9 Grief and Loss  7/29-8/2   Emotional    Stages of grief     Physical    Memorialized      Intellectual    Letters to loved ones      Occupational    Grief group     Spiritual    Loneliness     Environmental    Purpose and Hobbies     Financial    Values     Social    Hobbies     Access to Care  8/12-8/16 Pat      Service Access    Volunteer Work      Pain Management    Experiential Learning      Memory    Value of movement      Physical Wellness   Relationships  9/4-9/6 9/9-9/13   Medication    Self-Love      PAWS    Resentment      Hygiene (Sleep, Physical, etc)    Communication Skills      Emotional Wellbeing  8/19-8/23 Amends      Healthy vs. Unhealthy Feelings   Family      Affirmations   Social Anxieties      Co-Occurring Disorders   Legacy      Anxiety    Support(+ & -)     Depression   Assertive Communication      Grounding   Codependency     Trauma/Victim Identity    Boundaries     MH/CD Acceptance    Defense Mechanisms      Sober Structure  7/5-7/12 9/16-9/20 Relapse Prevention  7/15-7/19 9/23-9/27   Sober support groups    Triggers and High Risk Situations     Needds   Relapse Prevention Plan      Spirituality    Coping Skills      Schedule   Addictive Thoughts     Sobriety Vs. Recovery    Relapse Process      Power of Now    What is Addiction      Truth in life    Impulsive/Compulsive Behaviors      Recovery Investement    Cross Addiction      Recovery Influences    Early Recovery          Acceptance  8/26-8/30   Educational Videos    Mindfulness     No Kidding Me 2!    Phase III          Holiday relapse prevention  11/26, 12/24   Anonymous People    Pat  10/8/19       Be the stick  11/5/19       Emotions  10/22, 12/17       Processing  11/12, 12/10, 12/24, 12/31       Maintenance  11/19/19

## 2021-06-04 NOTE — PROGRESS NOTES
Willie Dougherty attended 1 hour of group on 12/17/2019.     The group topic was Emotions/Holiday prep, patient was responsive to topic.     Patient's engagement in the group session: high     Total number of patients present 8.     Counselor(s) present: Maranda Saab, [facility]    Supervising MD: ROSARIO Raman  12/17/2019, 11:28 AM

## 2021-06-04 NOTE — TELEPHONE ENCOUNTER
Patient called in stating he took double the prescribed amount of Wellbutrin XR today by accident and was told by pharmacy to call his clinic, call warm transferred to triage nurse at [facility]

## 2021-06-04 NOTE — PROGRESS NOTES
Willie Dougherty attended 2 hours of group on 12/31/2019.     The group topic was processing, patient was responsive to topic.     Patient's engagement in the group session: high     Total number of patients present 8.     Counselor(s) present: ROSARIO Baird     Supervising MD: ROSARIO Gonzalez  12/31/2019, 1:42 PM

## 2021-06-04 NOTE — PROGRESS NOTES
Weekly Progress Note  Willie Dougherty  1960  [id number]        D) Pt attended 1 groups this week with 0 absences. Patient attended 1 individual sessions this week. Patient is in phase III of SRP.  A) Staff facilitated groups and reviewed tx progress. Assessed for [facility]. R) No VAP needed at this time.   Any significant events, defines as events that impact patients relationship with others inside and outside of treatment: None at this time.   Indicate any changes or monitoring of physical or mental health problems: No emergent concerns      Indicate involvement by any outside supports: The patient identifies weekly that he engages with sober peers and enjoys doing various activities with his support network.   IAPP reviewed and modified as needed. NA  Patient was staffed with Dr. Gibbs and Dahlia Penn [facility] on 12/12/19.   Pt working on the following dimensions:  Dimension #1 - Withdrawal Potential - Risk 0. Patient reports a history of regular alcohol use. He states that he was recently discharged from residential treatment at [facility]. Patient denies any alcohol use since being out of treatment. He reports his last use of alcohol was in January 2019. Patient denies any current or past withdrawal symptoms. He shows no physical signs or symptoms of intoxication or withdrawal. Patient is oriented x4  Specific goals from treatment plan addressed this week:   1.The patient goal is to maintain sobriety .    Effectiveness of strategies: Strategies appear to be effective. The patient did not endorse any use over the last week.      Dimension #2 - Biomedical - Risk 0. Patient denies any current health issues or concerns. He does not currently have a PCP, but appears able to get his medical needs met and addressed as necessary. Patient denies any immediate medical needs or concerns. Patient appears to be in adequate physical health and functioning at this time  Specific goals from treatment plan addressed this week:    1. The patient goal is to maintain good physical health  Effectiveness of strategies: Strategies appear to be effective. The patient is working on obtaining a supplemental policy for his insurance through [place]. He is filling out paperwork and was told that should he struggle he can set up an appointment. Patient was not able to attend group last week because of severe tooth pain - the tooth was then extracted on 12/4.  He reported to the group that he had taken probably more Tylenol than he should have, and that he consulted with the [facility] nurseline, who connected him w/Poison Control, because of concerns about possibly overdosing.  (Patient states he now understands about the risks of taking too much Tylenol, and this led to a discussion w/all group members about that topic)     Dimension #3 - Emotional/Behavioral/Cognitive - Risk 1. Patient reports mental health diagnoses of depression and bipolar disorder. Patient does not currently have any mental health services as he was recently discharged from residential treatment. He does endorse that he will be seeking psychiatry services. Patient endorses medication to help manage his mental health symptoms. Patient denies any suicidal or homicidal thoughts or plans. Patient is oriented x4. .  Specific goals from treatment plan addressed this week:   1The patient goal is to manage mental health.  2. Remain medication compliant.    Effectiveness of strategies: Strategies appear to be effective. The patient is medication compliant at this time and attributes that to his mental health stability. He has been doing a good job at connecting with his emotions recently. The patient reports he agreed to family therapy with his parents and siblings - this week he reports some apprehensions about the initial session that is scheduled for today, 12/12/19.     Dimension #4 - Treatment Acceptance/Resistance - Risk 1. Patient reports that he does feel that he is required to be here  for treatment, and he would have liked a less intensive program. Patient does verbalize a desire to change his substance use at this time. Patient does appear ambivalent about his need for continued treatment at this time. He does verbalize willingness to follow treatment recommendations at this time. Patient was calm, cooperative, and appropriately behaved throughout this appointment  Specific goals from treatment plan addressed this week:   1.The patient goal is to follow through with intentions to treat chemical dependency concerns.   Effectiveness of strategies: Strategies appear to be effective. Patient is now in phase III of the SRP. The patient is a good group participant who is active and engaged. He is in the action stage of change.  No changes or new concerns.      Dimension #5 - Relapse Potential - Risk 3. Patient reports that he just completed his first treatment. He therefore appears to have some knowledge of addiction, but could benefit from additional education. Patient appears to lack some insight into his use and the impact it had on his life as he appears to minimize his need for treatment and change despite the negative consequences he has experienced. Patient appears to lack the necessary coping skills at this time to help him prevent relapse as he reports just having gotten out of residential treatment, and minimal periods of sobriety recently. Patient therefore appears to currently be at an increased risk of relapse  Specific goals from treatment plan addressed this week:   1. The patient goal is to identify and implement coping skills.   Effectiveness of strategies: Strategies appear to be effective. The patient reports no real urges or triggers over the last week, although the pain of his toothache and subsequent extraction did result in some thoughts of use.     Dimension #6 - Recovery Environment - Risk 3. Patient is currently unemployed, and so appears to lack structure for his daily life.  He was able to identify hobbies and activities he enjoys, but it did not appear that he was actively engaging in them prior to going to treatment. Patient reports that he is currently living at a sober house, and so has some support from his living environment. Patient reports that his family is supportive of his sobriety, and about half of his friends are as well. Some of the patient's friends could increase his risk of relapse as they drank together historically. Patient endorses pending legal consequences from a DWI that happened in August 2018. He denies any other current legal involvement.  Specific goals from treatment plan addressed this week:   1.The patient goal is to develop sober structured activities.  Effectiveness of strategies: Strategies appear to be effective. The patient continues to engage with sober peers and sober support. He has been actively seeking out sober support groups and sober peers. Patient is handling his legal concerns at this time. He is looking for long term [facility] housing and has been given information to complete this. The patient has attended [employer] recently and found it to be enjoyable.      T) Treatment plan updated no.  Patient notified and in agreement NA.  Patient educated on maintenance/processing. Patient has completed 85 phase I & II program hours. Currently has completed 23/24 phase III hours . Projected discharge date is TBD. Current discharge plan is TBD.     Psycho-Educational Curriculum  Date Attended  Psycho-Educational Curriculum  Date Attended    8 Dimensions of wellness  8/5-8/9 Grief and Loss  7/29-8/2   Emotional    Stages of grief     Physical    Memorialized      Intellectual    Letters to loved ones      Occupational    Grief group     Spiritual    Loneliness     Environmental    Purpose and Hobbies     Financial    Values     Social    Hobbies     Access to Care  8/12-8/16 Pat      Service Access    Volunteer Work      Pain Management    Experiential Learning       Memory    Value of movement      Physical Wellness   Relationships  9/4-9/6 9/9-9/13   Medication    Self-Love      PAWS    Resentment     Hygiene (Sleep, Physical, etc)    Communication Skills      Emotional Wellbeing  8/19-8/23 Amends      Healthy vs. Unhealthy Feelings   Family      Affirmations   Social Anxieties      Co-Occurring Disorders   Legacy      Anxiety    Support(+ & -)     Depression   Assertive Communication      Grounding   Codependency     Trauma/Victim Identity    Boundaries     MH/CD Acceptance    Defense Mechanisms      Sober Structure  7/5-7/12 9/16-9/20 Relapse Prevention  7/15-7/19 9/23-9/27   Sober support groups    Triggers and High Risk Situations     Needds   Relapse Prevention Plan      Spirituality    Coping Skills      Schedule   Addictive Thoughts     Sobriety Vs. Recovery    Relapse Process      Power of Now    What is Addiction      Truth in life    Impulsive/Compulsive Behaviors      Recovery Investement    Cross Addiction      Recovery Influences    Early Recovery          Acceptance  8/26-8/30   Educational Videos    Mindfulness     No Kidding Me 2!    Phase III          Holiday relapse prevention  11/26   Anonymous People    Pat  10/8/19       Be the stick  11/5/19       Emotions  10/22       Processing  11/12, 12/10       Maintenance  11/19/19

## 2021-06-04 NOTE — TELEPHONE ENCOUNTER
Spoke with patient, he reports he accidentally took extra Wellbutrin  mg this morning, total amount of Wellbutrin taken, 600 mg. Pt said he called the pharmacist and pharmacy directed him to clinic provider. This nurse spoke with Dr Gibbs and received directives, have patient skip dose tomorrow and then resume next day. Call placed to patient, patient appreciative for reassurance.

## 2021-06-04 NOTE — PROGRESS NOTES
Willie Dougherty attended 2 hours of group on 12/10/2019.     The group topic was processing and support, patient was responsive to topic.     Patient's engagement in the group session: high     Total number of patients present 7.     Supervising MD: Dr. Sai Matthews, [facility]  12/10/2019, 10:43 AM

## 2021-06-04 NOTE — PROGRESS NOTES
The patient was not present for group on 12/3/19. The patient was absent due to dental concerns. Patient is expected to present this coming Tuesday. Patient was unable to be assessed so no weekly DART is being entered.     ROSARIO Baird 12/5/2019 7:59 AM

## 2021-06-05 NOTE — TELEPHONE ENCOUNTER
Patient would like a call back from Dr Gibbs.  He said it is not urgent, but the side effect he is having is personal.  He did not want to discuss it with staff. He had to hang up because he was trying to catch the bus and running late for his therapy appointment today.  He will call back.

## 2021-06-05 NOTE — PROGRESS NOTES
Weekly Progress Note  Willie Dougherty  1960  [id number]        D) Pt attended 1 group this week with 0 absences. Patient attended 0 individual sessions this week. Patient is in phase III of SRP.  A) Staff facilitated groups and reviewed tx progress. [facility] for [facility]. R) No VAP needed at this time.   Any significant events, defines as events that impact patients relationship with others inside and outside of treatment: Patients anticipated DC date from phase III is 1/21/2020   Indicate any changes or monitoring of physical or mental health problems: No emergent concerns      Indicate involvement by any outside supports: The patient identifies weekly that he engages with sober peers and enjoys doing various activities with his support network.   IAPP reviewed and modified as needed. NA  Patient was last staffed with Dr. Gibbs and Dahlia Penn [facility] on 1/9/2020   Pt working on the following dimensions:  Dimension #1 - Withdrawal Potential - Risk 0. Patient reports a history of regular alcohol use. He states that he was recently discharged from residential treatment at [place]. Patient denies any alcohol use since being out of treatment. He reports his last use of alcohol was in January 2019. Patient denies any current or past withdrawal symptoms. He shows no physical signs or symptoms of intoxication or withdrawal. Patient is oriented x4  Specific goals from treatment plan addressed this week:   1.The patient goal is to maintain sobriety .    Effectiveness of strategies: Strategies appear to be effective. No use reported over the last week.      Dimension #2 - Biomedical - Risk 0. Patient denies any current health issues or concerns. He does not currently have a PCP, but appears able to get his medical needs met and addressed as necessary. Patient denies any immediate medical needs or concerns. Patient appears to be in adequate physical health and functioning at this time  Specific goals from treatment plan  addressed this week:   1. The patient goal is to maintain good physical health  Effectiveness of strategies: Strategies appear to be effective. The patient is working on obtaining a supplemental policy for his insurance through [place].  No new or emergent concerns. Patient is able to access care as needed.      Dimension #3 - Emotional/Behavioral/Cognitive - Risk 1. Patient reports mental health diagnoses of depression and bipolar disorder. Patient does not currently have any mental health services as he was recently discharged from residential treatment. He does endorse that he will be seeking psychiatry services. Patient endorses medication to help manage his mental health symptoms. Patient denies any suicidal or homicidal thoughts or plans. Patient is oriented x4. .  Specific goals from treatment plan addressed this week:   1.The patient goal is to manage mental health.  2. Remain medication compliant.    Effectiveness of strategies: Strategies appear to be effective. The patient is medication compliant at this time and attributes his mental health stability to that. The patient is not reporting any new or emergent emotional concerns at this time. The patient is reporting that he is working on his relationship with his father at this time and that they have taken small steps to start to communicate. Patient still reports tension between the two of them however feels as though it is better than where it had been. Patient sees DAVE Rey for individual therapy services.      Dimension #4 - Treatment Acceptance/Resistance - Risk 1. Patient reports that he does feel that he is required to be here for treatment, and he would have liked a less intensive program. Patient does verbalize a desire to change his substance use at this time. Patient does appear ambivalent about his need for continued treatment at this time. He does verbalize willingness to follow treatment recommendations at this time. Patient was  calm, cooperative, and appropriately behaved throughout this appointment  Specific goals from treatment plan addressed this week:   1.The patient goal is to follow through with intentions to treat chemical dependency concerns.   Effectiveness of strategies: Strategies appear to be effective. Patient is now in phase III of the SRP. The patient participates and is actively engaged in group processes. He is in the action stage of change. Patient will DC next week.      Dimension #5 - Relapse Potential - Risk 3. Patient reports that he just completed his first treatment. He therefore appears to have some knowledge of addiction, but could benefit from additional education. Patient appears to lack some insight into his use and the impact it had on his life as he appears to minimize his need for treatment and change despite the negative consequences he has experienced. Patient appears to lack the necessary coping skills at this time to help him prevent relapse as he reports just having gotten out of residential treatment, and minimal periods of sobriety recently. Patient therefore appears to currently be at an increased risk of relapse  Specific goals from treatment plan addressed this week:   1. The patient goal is to identify and implement coping skills.   Effectiveness of strategies: Strategies appear to be effective. The patient reports no urges and attributes that to his relapse prevention plan. No changes at this time.      Dimension #6 - Recovery Environment - Risk 3. Patient is currently unemployed, and so appears to lack structure for his daily life. He was able to identify hobbies and activities he enjoys, but it did not appear that he was actively engaging in them prior to going to treatment. Patient reports that he is currently living at a sober house, and so has some support from his living environment. Patient reports that his family is supportive of his sobriety, and about half of his friends are as well.  Some of the patient's friends could increase his risk of relapse as they drank together historically. Patient endorses pending legal consequences from a DWI that happened in August 2018. He denies any other current legal involvement.  Specific goals from treatment plan addressed this week:   1.The patient goal is to develop sober structured activities.  Effectiveness of strategies: Strategies appear to be effective. The patient continues to engage with sober peers and sober support. He reports attending sober support meetings in the past week. Patient is handling his legal concerns at this time. He is looking for long term housing and has been given information to complete this. The patient reports continued engagement in sober support groups. He identifies that his sober support network is very important and what has helped him achieve his maintained sobriety. No changes over the last week.      T) Treatment plan updated no.  Patient notified and in agreement NA.  Patient educated on gifts of recovery. Patient has completed 85 phase I & II program hours. Currently has completed 32/24 phase III hours . Projected discharge date is 1/21/2020. Current discharge plan is community resources.     Psycho-Educational Curriculum  Date Attended  Psycho-Educational Curriculum  Date Attended    8 Dimensions of wellness  8/5-8/9 Grief and Loss  7/29-8/2   Emotional    Stages of grief     Physical    Memorialized      Intellectual    Letters to loved ones      Occupational    Grief group     Spiritual    Loneliness     Environmental    Purpose and Hobbies     Financial    Values     Social    Hobbies     Access to Care  8/12-8/16 Pat      Service Access    Volunteer Work      Pain Management    Experiential Learning      Memory    Value of movement      Physical Wellness   Relationships  9/4-9/6 9/9-9/13   Medication    Self-Love      PAWS    Resentment     Hygiene (Sleep, Physical, etc)    Communication Skills      Emotional  Wellbeing  8/19-8/23 Amends      Healthy vs. Unhealthy Feelings   Family      Affirmations   Social Anxieties      Co-Occurring Disorders   Legacy      Anxiety    Support(+ & -)     Depression   Assertive Communication      Grounding   Codependency     Trauma/Victim Identity    Boundaries     MH/CD Acceptance    Defense Mechanisms      Sober Structure  7/5-7/12 9/16-9/20 Relapse Prevention  7/15-7/19 9/23-9/27   Sober support groups    Triggers and High Risk Situations     Needds   Relapse Prevention Plan      Spirituality    Coping Skills      Schedule   Addictive Thoughts     Sobriety Vs. Recovery    Relapse Process      Power of Now    What is Addiction      Truth in life    Impulsive/Compulsive Behaviors      Recovery Investement    Cross Addiction      Recovery Influences    Early Recovery          Acceptance  8/26-8/30   Educational Videos    Mindfulness     No Kidding Me 2!    Phase III          Holiday relapse prevention  11/26, 12/24   Anonymous People    Pat  10/8/19       Be the stick  11/5/19       Emotions  10/22, 12/17       Processing  11/12, 12/10, 12/24, 12/31, 1/7     Gifts of recovery  1/14       Maintenance  11/19/19

## 2021-06-05 NOTE — PROGRESS NOTES
Willie Dougherty attended 2 hours of group on 1/14/2020.     The group topic was gifts of recovery , patient was responsive to topic.     Patient's engagement in the group session: high     Total number of patients present 7.     Counselor(s) present: ROSARIO Baird     Supervising MD: ROSARIO Gonzalez  1/14/2020, 12:00 PM

## 2021-06-05 NOTE — PROGRESS NOTES
Weekly Progress Note  Willie Dougherty  1960  [id number]        D) Pt attended 1 group this week with 0 absences. Patient attended 0 individual sessions this week. Patient is in phase III of SRP.  A) Staff facilitated groups and reviewed tx progress. [facility]essed for [facility]. R) No VAP needed at this time.   Any significant events, defines as events that impact patients relationship with others inside and outside of treatment: Patients anticipated DC date from phase III is 1/21/2020   Indicate any changes or monitoring of physical or mental health problems: No emergent concerns      Indicate involvement by any outside supports: The patient identifies weekly that he engages with sober peers and enjoys doing various activities with his support network.   IAPP reviewed and modified as needed. NA  Patient was last staffed with Dr. Gibbs and Dahlia Penn [facility] on 1/2/2020   Pt working on the following dimensions:  Dimension #1 - Withdrawal Potential - Risk 0. Patient reports a history of regular alcohol use. He states that he was recently discharged from residential treatment at [place]. Patient denies any alcohol use since being out of treatment. He reports his last use of alcohol was in January 2019. Patient denies any current or past withdrawal symptoms. He shows no physical signs or symptoms of intoxication or withdrawal. Patient is oriented x4  Specific goals from treatment plan addressed this week:   1.The patient goal is to maintain sobriety .    Effectiveness of strategies: Strategies appear to be effective. Patient reports maintained abstinence.       Dimension #2 - Biomedical - Risk 0. Patient denies any current health issues or concerns. He does not currently have a PCP, but appears able to get his medical needs met and addressed as necessary. Patient denies any immediate medical needs or concerns. Patient appears to be in adequate physical health and functioning at this time  Specific goals from treatment  plan addressed this week:   1. The patient goal is to maintain good physical health  Effectiveness of strategies: Strategies appear to be effective. The patient is working on obtaining a supplemental policy for his insurance through [place].  At this time the patient is not reporting any new or emergent biomedical concerns.      Dimension #3 - Emotional/Behavioral/Cognitive - Risk 1. Patient reports mental health diagnoses of depression and bipolar disorder. Patient does not currently have any mental health services as he was recently discharged from residential treatment. He does endorse that he will be seeking psychiatry services. Patient endorses medication to help manage his mental health symptoms. Patient denies any suicidal or homicidal thoughts or plans. Patient is oriented x4. .  Specific goals from treatment plan addressed this week:   1.The patient goal is to manage mental health.  2. Remain medication compliant.    Effectiveness of strategies: Strategies appear to be effective. The patient is medication compliant at this time and attributes his mental health stability to that. The patient is not reporting any new or emergent emotional concerns at this time. The patient is reporting that he is working on his relationship with his father at this time and that they have taken small steps to start to communicate. Patient still reports tension between the two of them however feels as though it is better than where it had been.      Dimension #4 - Treatment Acceptance/Resistance - Risk 1. Patient reports that he does feel that he is required to be here for treatment, and he would have liked a less intensive program. Patient does verbalize a desire to change his substance use at this time. Patient does appear ambivalent about his need for continued treatment at this time. He does verbalize willingness to follow treatment recommendations at this time. Patient was calm, cooperative, and appropriately behaved throughout  this appointment  Specific goals from treatment plan addressed this week:   1.The patient goal is to follow through with intentions to treat chemical dependency concerns.   Effectiveness of strategies: Strategies appear to be effective. Patient is now in phase III of the [facility]. The patient participates and is actively engaged in group processes. He is in the action stage of change. No change.      Dimension #5 - Relapse Potential - Risk 3. Patient reports that he just completed his first treatment. He therefore appears to have some knowledge of addiction, but could benefit from additional education. Patient appears to lack some insight into his use and the impact it had on his life as he appears to minimize his need for treatment and change despite the negative consequences he has experienced. Patient appears to lack the necessary coping skills at this time to help him prevent relapse as he reports just having gotten out of residential treatment, and minimal periods of sobriety recently. Patient therefore appears to currently be at an increased risk of relapse  Specific goals from treatment plan addressed this week:   1. The patient goal is to identify and implement coping skills.   Effectiveness of strategies: Strategies appear to be effective. The patient reports no urges and attributes that to his relapse prevention plan as he was at a party where there was alcohol and he identified for himself that it would be in his best interest if he left the party and came back the next day for the breakfast portion. Patient has been doing a good job at identifying his triggers and urges and identifying when he needs to utilize his escape plan.      Dimension #6 - Recovery Environment - Risk 3. Patient is currently unemployed, and so appears to lack structure for his daily life. He was able to identify hobbies and activities he enjoys, but it did not appear that he was actively engaging in them prior to going to treatment.  Patient reports that he is currently living at a sober house, and so has some support from his living environment. Patient reports that his family is supportive of his sobriety, and about half of his friends are as well. Some of the patient's friends could increase his risk of relapse as they drank together historically. Patient endorses pending legal consequences from a DWI that happened in August 2018. He denies any other current legal involvement.  Specific goals from treatment plan addressed this week:   1.The patient goal is to develop sober structured activities.  Effectiveness of strategies: Strategies appear to be effective. The patient continues to engage with sober peers and sober support. He reports attending 3 AA meetings in the past week. Patient is handling his legal concerns at this time. He is looking for long term [facility] housing and has been given information to complete this. The patient has attended Buddhism recently and found it to be enjoyable. The patient reports continued engagement in sober support groups. He identifies that his sober support network is very important and what has helped him achieve his maintained sobriety.        T) Treatment plan updated no.  Patient notified and in agreement NA.  Patient educated on group processing. Patient has completed 85 phase I & II program hours. Currently has completed 30/24 phase III hours . Projected discharge date is TBD. Current discharge plan is community resources.     Psycho-Educational Curriculum  Date Attended  Psycho-Educational Curriculum  Date Attended    8 Dimensions of wellness  8/5-8/9 Grief and Loss  7/29-8/2   Emotional    Stages of grief     Physical    Memorialized      Intellectual    Letters to loved ones      Occupational    Grief group     Spiritual    Loneliness     Environmental    Purpose and Hobbies     Financial    Values     Social    Hobbies     Access to Care  8/12-8/16 Pat      Service Access    Volunteer Work      Pain  Management    Experiential Learning      Memory    Value of movement      Physical Wellness   Relationships  9/4-9/6 9/9-9/13   Medication    Self-Love      PAWS    Resentment     Hygiene (Sleep, Physical, etc)    Communication Skills      Emotional Wellbeing  8/19-8/23 Amends      Healthy vs. Unhealthy Feelings   Family      Affirmations   Social Anxieties      Co-Occurring Disorders   Legacy      Anxiety    Support(+ & -)     Depression   Assertive Communication      Grounding   Codependency     Trauma/Victim Identity    Boundaries     MH/CD Acceptance    Defense Mechanisms      Sober Structure  7/5-7/12 9/16-9/20 Relapse Prevention  7/15-7/19 9/23-9/27   Sober support groups    Triggers and High Risk Situations     Needds   Relapse Prevention Plan      Spirituality    Coping Skills      Schedule   Addictive Thoughts     Sobriety Vs. Recovery    Relapse Process      Power of Now    What is Addiction      Truth in life    Impulsive/Compulsive Behaviors      Recovery Investement    Cross Addiction      Recovery Influences    Early Recovery          Acceptance  8/26-8/30   Educational Videos    Mindfulness     No Kidding Me 2!    Phase III          Holiday relapse prevention  11/26, 12/24   Anonymous People    Pat  10/8/19       Be the stick  11/5/19       Emotions  10/22, 12/17       Processing  11/12, 12/10, 12/24, 12/31, 1/7       Maintenance  11/19/19

## 2021-06-05 NOTE — PROGRESS NOTES
Weekly Progress Note  Willie Dougherty  1960  [id number]        D) Pt attended 1 group this week with 0 absences. Patient attended 0 individual sessions this week. Patient is in phase III of SRP.  A) Staff facilitated groups and reviewed tx progress. Assessed for [facility]. R) No VAP needed at this time.   Any significant events, defines as events that impact patients relationship with others inside and outside of treatment: Patients anticipated DC date from phase III is 1/28/2020   Indicate any changes or monitoring of physical or mental health problems: No emergent concerns      Indicate involvement by any outside supports: The patient identifies weekly that he engages with sober peers and enjoys doing various activities with his support network.   IAPP reviewed and modified as needed. NA  Patient was last staffed with Dr. Gibbs and [facility] on 1/9/2020   Pt working on the following dimensions:  Dimension #1 - Withdrawal Potential - Risk 0. Patient reports a history of regular alcohol use. He states that he was recently discharged from residential treatment at [place]. Patient denies any alcohol use since being out of treatment. He reports his last use of alcohol was in January 2019. Patient denies any current or past withdrawal symptoms. He shows no physical signs or symptoms of intoxication or withdrawal. Patient is oriented x4  Specific goals from treatment plan addressed this week:   1.The patient goal is to maintain sobriety .    Effectiveness of strategies: Strategies appear to be effective. No use reported over the last week.      Dimension #2 - Biomedical - Risk 0. Patient denies any current health issues or concerns. He does not currently have a PCP, but appears able to get his medical needs met and addressed as necessary. Patient denies any immediate medical needs or concerns. Patient appears to be in adequate physical health and functioning at this time  Specific goals from treatment plan  addressed this week:   1. The patient goal is to maintain good physical health  Effectiveness of strategies: Strategies appear to be effective. The patient is working on obtaining a supplemental policy for his insurance through [place].  No new or emergent concerns. Patient is able to access care as needed.      Dimension #3 - Emotional/Behavioral/Cognitive - Risk 1. Patient reports mental health diagnoses of depression and bipolar disorder. Patient does not currently have any mental health services as he was recently discharged from residential treatment. He does endorse that he will be seeking psychiatry services. Patient endorses medication to help manage his mental health symptoms. Patient denies any suicidal or homicidal thoughts or plans. Patient is oriented x4. .  Specific goals from treatment plan addressed this week:   1.The patient goal is to manage mental health.  2. Remain medication compliant.    Effectiveness of strategies: Strategies appear to be effective. The patient is medication compliant at this time and attributes his mental health stability to that. The patient is not reporting any new or emergent emotional concerns at this time. The patient is reporting that he is working on his relationship with his father at this time and that they have taken small steps to start to communicate. Patient reports that he and his father worked together as a team this week due to family emergency and felt good about their interaction during this time. Patient sees DAVE Rey for individual therapy services.      Dimension #4 - Treatment Acceptance/Resistance - Risk 1. Patient reports that he does feel that he is required to be here for treatment, and he would have liked a less intensive program. Patient does verbalize a desire to change his substance use at this time. Patient does appear ambivalent about his need for continued treatment at this time. He does verbalize willingness to follow treatment  recommendations at this time. Patient was calm, cooperative, and appropriately behaved throughout this appointment  Specific goals from treatment plan addressed this week:   1.The patient goal is to follow through with intentions to treat chemical dependency concerns.   Effectiveness of strategies: Strategies appear to be effective. Patient is now in phase III of the SRP. The patient participates and is actively engaged in group processes. He is in the action stage of change. Patient will DC next week.      Dimension #5 - Relapse Potential - Risk 3. Patient reports that he just completed his first treatment. He therefore appears to have some knowledge of addiction, but could benefit from additional education. Patient appears to lack some insight into his use and the impact it had on his life as he appears to minimize his need for treatment and change despite the negative consequences he has experienced. Patient appears to lack the necessary coping skills at this time to help him prevent relapse as he reports just having gotten out of residential treatment, and minimal periods of sobriety recently. Patient therefore appears to currently be at an increased risk of relapse  Specific goals from treatment plan addressed this week:   1. The patient goal is to identify and implement coping skills.   Effectiveness of strategies: Strategies appear to be effective. The patient reports experiencing some urges and triggers this week, however, used skills and maintained abstinence.      Dimension #6 - Recovery Environment - Risk 3. Patient is currently unemployed, and so appears to lack structure for his daily life. He was able to identify hobbies and activities he enjoys, but it did not appear that he was actively engaging in them prior to going to treatment. Patient reports that he is currently living at a sober house, and so has some support from his living environment. Patient reports that his family is supportive of his  sobriety, and about half of his friends are as well. Some of the patient's friends could increase his risk of relapse as they drank together historically. Patient endorses pending legal consequences from a DWI that happened in August 2018. He denies any other current legal involvement.  Specific goals from treatment plan addressed this week:   1.The patient goal is to develop sober structured activities.  Effectiveness of strategies: Strategies appear to be effective. The patient continues to engage with sober peers and sober support. He reports attending sober support meetings in the past week. Patient is handling his legal concerns at this time. He is looking for long term housing and has been given information to complete this. The patient reports continued engagement in sober support groups. He identifies that his sober support network is very important and what has helped him achieve his maintained sobriety. Patient reports meeting some of his neighbors and identified this as a pleasant experience. No changes over the last week.      T) Treatment plan updated Yes.  Patient notified and in agreement Patient to sign during next group session.   Patient educated on relapse prevention. Patient has completed 85 phase I & II program hours. Currently has completed 34/24 phase III hours . Projected discharge date is 1/21/2020. Current discharge plan is community resources.     Psycho-Educational Curriculum  Date Attended  Psycho-Educational Curriculum  Date Attended    8 Dimensions of wellness  8/5-8/9 Grief and Loss  7/29-8/2   Emotional    Stages of grief     Physical    Memorialized      Intellectual    Letters to loved ones      Occupational    Grief group     Spiritual    Loneliness     Environmental    Purpose and Hobbies     Financial    Values     Social    Hobbies     Access to Care  8/12-8/16 Pat      Service Access    Volunteer Work      Pain Management    Experiential Learning      Memory    Value of  movement      Physical Wellness   Relationships  9/4-9/6 9/9-9/13   Medication    Self-Love      PAWS    Resentment     Hygiene (Sleep, Physical, etc)    Communication Skills      Emotional Wellbeing  8/19-8/23 Amends      Healthy vs. Unhealthy Feelings   Family      Affirmations   Social Anxieties      Co-Occurring Disorders   Legacy      Anxiety    Support(+ & -)     Depression   Assertive Communication      Grounding   Codependency     Trauma/Victim Identity    Boundaries     MH/CD Acceptance    Defense Mechanisms      Sober Structure  7/5-7/12 9/16-9/20 Relapse Prevention  7/15-7/19 9/23-9/27   Sober support groups    Triggers and High Risk Situations     Needds   Relapse Prevention Plan      Spirituality    Coping Skills      Schedule   Addictive Thoughts     Sobriety Vs. Recovery    Relapse Process      Power of Now    What is Addiction      Truth in life    Impulsive/Compulsive Behaviors      Recovery Investement    Cross Addiction      Recovery Influences    Early Recovery          Acceptance  8/26-8/30   Educational Videos    Mindfulness     No Kidding Me 2!    Phase III          Holiday relapse prevention  11/26, 12/24   Anonymous People    Pat  10/8/19       Be the stick  11/5/19       Emotions  10/22, 12/17       Processing  11/12, 12/10, 12/24, 12/31, 1/7       Gifts of recovery  1/14       Maintenance  11/19/19     Relapse Prevention 1/21/2020

## 2021-06-05 NOTE — TELEPHONE ENCOUNTER
Returned call to patient flavio said that he first noticed the rash in December after the increase in lamictal to 200mg. It was along his inner thighs and groin. He said that it started to get worse and looked as if it were blistering so he went to his PCP who told him to stop the lamictal. He said he too a dose on Saturday, but none since. Patient said that the rash started resolving after he stopped the lamictal. Patient is seeing Dr. Gibbs tomorrow but wanted her to know why he'd stopped the medications.     Patient also mentioned that the lamictal was very good at controlling his mood and that since he has stopped taking it, he has noticed mild carmen symptoms.

## 2021-06-05 NOTE — PROGRESS NOTES
Willie Dougherty attended 2 hours of group on 1/7/2020.     The group topic was group processing, patient was responsive to topic.     Patient's engagement in the group session: medium     Total number of patients present 9.     Counselor(s) present: SE Hewitt, ROSARIO and ROSARIO Mckay    Supervising MD: ROSARIO Gonzalez  1/7/2020, 2:13 PM

## 2021-06-05 NOTE — TELEPHONE ENCOUNTER
"Please connect with the patient regarding a medication clarification:    Due to the patient not taking his lamoTRIgine (LAMICTAL) 100 MG tablet, can he \"double up\" on his hydrOXYzine pamoate (VISTARIL) 50 MG capsule until he sees Dr. Gibbs on Monday 1.27.2020. Please connect with the patient to advise at [phone].      "

## 2021-06-05 NOTE — TELEPHONE ENCOUNTER
Date of Last Office Visit: 01/27/2020  Date of Next Office Visit: None Scheduled at this time  No shows since last visit: 1 1/23/2020  Cancellations since last visit: 1 1/21/2020  ED visits since last visit:  None  Medication divalproex 500 mg 24 hr tablet date last ordered: 01/27/2020  Qty: 30  Refills: 2  Medication propranolol 10 mg tablet date last ordered: 01/27/2020  Qty: 90  Refills:2  Lapse in therapy greater than 7 days: No  Medication refill request verified as identical to current order: Per Pharmacy: Patient is requesting a 90 day supply.  Result of Last DAM, VPA, Li+ Level, CBC, or Carbamazepine Level (at or since last visit): 01/27/2020 DAM=Neg      [] Medication refilled per [facility] M-1.   [] Medication unable to be refilled by RN due to criteria not met as indicated below:     []Eligibility - not seen in last year    []Supervision - no future appointment    []Compliance     []Verification - order discrepancy    []Controlled Medication    []Medication not included in RN Protocol    [x]90 - day supply request    [x]Other CMA pending medication refills    Current Medication list:   buPROPion (WELLBUTRIN XL) 300 MG 24 hr tablet TAKE 1 TABLET(300 MG) BY MOUTH DAILY   divalproex (DEPAKOTE ER) 500 MG 24 hour tablet Take 1 tablet (500 mg total) by mouth at bedtime.   hydrOXYzine pamoate (VISTARIL) 50 MG capsule Take 1 capsule (50 mg total) by mouth 3 (three) times a day as needed for anxiety.   propranolol (INDERAL) 10 MG tablet Take 1 tablet (10 mg total) by mouth 3 (three) times a day as needed.   lamoTRIgine (LAMICTAL) 100 MG tablet (Discontinued) Take 1 tablet (100 mg total) by mouth daily.       Medication Plan of Care at last office visit with MD/CNP:    Diagnoses/Plan:     1. Alcohol use disorder, severe, dependence (H)  -Patient is celebrating 1 year of sobriety today.  He was congratulated for his sustained and successful sobriety.  Anticipated discharge from [facility] is tomorrow  January 28.  -Recommendations will be engagement with community resources such as AA/NA groups, sponsor contacts and continuing mental health services with psychotherapist and addiction medicine clinic at [facility]  - Drug Abuse 1+, Urine  - Ethyl Glucuronide and Ethyl Sulfate, Urine, Quantitative ([facility] ETG/S)     2. Bipolar I disorder, most recent episode depressed (H)  -Due to side effect of Lamictal rash, Lamictal will be discontinued.   - Discussed different therapeutic options for management of bipolar disorder.  Patient had trialed multiple mood stabilizers and had experienced side effects from any of them.  -Lithium, unable to continue due to noncompliance with follow-ups for blood levels  -Depakote, had experience drowsiness from higher doses   -Latuda, EPS reaction  -Gabapentin, causes drowsiness  Willing to retryl-Depakote at a low dose.  Discussed with patient prior psychiatrist Dr. Mimi Eaton who will send me a list of mood stabilizers the patient has trialed in the past.  She agreed with trial of Depakote and possibly considering Keppra in the future if needed and Depakote does not work out  -Counseled patient about side effects from Depakote: Liver problems can occur with this drug and sometimes they can be deadly.  Most of the time liver problems happen within the first 6 months after starting the medication.  Patient was advised to call the clinic or go to an emergency room right away if he has signs of liver problems, dark urine, upset stomach, stomach pain, light-colored stools, throwing up, yellowing of the skin or eyes.  -Liver enzymes and renal profile will be requested on the next clinic appointment in 4 weeks.  -Reviewed prior levels of liver enzymes and kidney function levels done in 2018 and were within normal limits-  - divalproex (DEPAKOTE ER) 500 MG 24 hour tablet; Take 1 tablet (500 mg total) by mouth at bedtime.  Dispense: 30 tablet; Refill: 2      -Titration of Depakote  typically occurs every 3 to 5 days up to a therapeutic dose of 1.5 to 2 g daily  -Patient agreed to call the clinic and let us know how the medications is working within 3 to 5 days.  3. Adjustment disorder with anxiety  DC Vistaril due to not sufficient improvement of anxiety  - propranolol (INDERAL) 10 MG tablet; Take 1 tablet (10 mg total) by mouth 3 (three) times a day as needed.  Dispense: 90 tablet; Refill: 2

## 2021-06-05 NOTE — PROGRESS NOTES
Willie Dougherty attended 2 hours of group on 1/28/2020. Patient graduated from the group today.     The group topic was spirituality, patient was responsive to topic.     Patient's engagement in the group session: medium     Total number of patients present 7.     Counselor(s) present: SE Hewitt, ROSARIO and ROSARIO Mckay    Supervising MD: ROSARIO Gonzalez  1/28/2020, 12:04 PM

## 2021-06-05 NOTE — TELEPHONE ENCOUNTER
Pt approached this writer in the clinic lobby wanting to pass some information to Dr. Gibbs. Pt said that he only picked up a week supply of Depakote and Inderal due to cost - had to pay over a hundred dollars for his medications.  He was advised by the pharmacy staff to contact his ins. Pt said he is planning to contact [facility].   Left  for the pt updating that we did not receive any PA requests for his meds. Advised to contact his ins first and give us a call if he needs help with med coverage.

## 2021-06-05 NOTE — TELEPHONE ENCOUNTER
Patient wanted Dr. Gibbs to know that his rash has cleared up since stopping the Lamictal.  He would like to discuss at this appointment on Monday an alternative to the Lamictal as well as anxiety medication.  He is utilizing his vistaril, but said it is not as effective as some other medications he has been on in the past.

## 2021-06-05 NOTE — TELEPHONE ENCOUNTER
Please connect with the patient when available regarding a potential side effect the patient is having  from his mediation lamoTRIgine (LAMICTAL) 100 MG tablet.     The patient went to his PCP, Friday 1.17.2020; they suggested it could be an allergic reaction to his medication. Pt was getting a sever rash.The patient has stopped taking this medication as of 1.20.2020. Since stopping the medication the rash has started to clear.     Please follow up with the patient to discuss

## 2021-06-05 NOTE — PROGRESS NOTES
Willie Dougherty attended 2 hours of group on 1/21/2020.     The group topic was Relapse Prevention, patient was responsive to topic.     Patient's engagement in the group session: high     Total number of patients present 8.     Counselor(s) present: SE Hewitt, ESTELLE and ROSARIO Yang    Supervising MD: Dr. Sai Fry  1/21/2020, 10:38 AM

## 2021-06-05 NOTE — TELEPHONE ENCOUNTER
Please connect with the patient when available regarding some potential side effects with his medications; pt would like to discuss more in depth with his care team. Please follow up with the patient when available at [phone].

## 2021-06-06 NOTE — TELEPHONE ENCOUNTER
Patient left  on central intake line last night 518pm, asking for call back from a RN. [phone]. No other info was given.

## 2021-06-06 NOTE — TELEPHONE ENCOUNTER
Got a note from the pharmacy stating that Willie is asking for a cheaper alternative. Spoke to Willie, came in agreement to utilize Good Rx program. Provider notified. Prescriptions for Depakote and Propranolol printed and signed by Dr. Gibbs, pt will come to pick it up today or tomorrow. Rxs left in a locked cabinet in triage area.

## 2021-06-06 NOTE — TELEPHONE ENCOUNTER
Spoke to pt, Willie contacted ins because he can't afford depakote and propranolol, said he has a $400 deductible. Pt was told by ins that two of his meds are tier four starting this year but was suggested  generic. Pt advised to check with his pharmacy first and contact us with any questions.

## 2021-06-06 NOTE — PROGRESS NOTES
Called [facility] and cancelled the prescription for Depakote  dated 2/19/2020  - e-scribed in error

## 2021-06-06 NOTE — TELEPHONE ENCOUNTER
Date of Last Office Visit: 1/27/2020  Date of Next Office Visit: none scheduled  No shows since last visit: none  Cancellations since last visit: none  ED visits since last visit:  none  Medication Wellbutrin  mg date last ordered: 8/21/2019  Qty: 90  Refills: 1  Medication Omeprazole 20 mg date last ordered: 8/21/19  Qty: 90  Refills: 1  Lapse in therapy greater than 7 days: unknown  Medication refill request verified as identical to current order: yes  Result of Last DAM, VPA, Li+ Level, CBC, or Carbamazepine Level (at or since last visit): N/A     [] Medication refilled per [facility] M-1.   [x] Medication unable to be refilled by RN due to criteria not met as indicated below:     []Eligibility - not seen in last year    []Supervision - no future appointment    []Compliance     []Verification - order discrepancy    []Controlled Medication    []Medication not included in RN Protocol    [x]90 - day supply request    []Other     Current Medication list:    buPROPion (WELLBUTRIN XL) 300 MG 24 hr tablet TAKE 1 TABLET(300 MG) BY MOUTH DAILY   divalproex (DEPAKOTE ER) 500 MG 24 hour tablet TAKE 1 TABLET(500 MG) BY MOUTH AT BEDTIME   hydrOXYzine pamoate (VISTARIL) 50 MG capsule Take 1 capsule (50 mg total) by mouth 3 (three) times a day as needed for anxiety.   propranolol (INDERAL) 10 MG tablet TAKE 1 TABLET(10 MG) BY MOUTH THREE TIMES DAILY AS NEEDED       Medication Plan of Care at last office visit with MD/CNP:    Diagnoses/Plan:  1. Alcohol use disorder, severe, dependence (H)  -Patient is celebrating 1 year of sobriety today.  He was congratulated for his sustained and successful sobriety.  Anticipated discharge from [facility] is tomorrow January 28.  -Recommendations will be engagement with community resources such as AA/NA groups, sponsor contacts and continuing mental health services with psychotherapist and addiction medicine clinic at [facility]  - Drug Abuse 1+, Urine  - Ethyl  Glucuronide and Ethyl Sulfate, Urine, Quantitative ([facility] ETG/S)  2. Bipolar I disorder, most recent episode depressed (H)  -Due to side effect of Lamictal rash, Lamictal will be discontinued.   - Discussed different therapeutic options for management of bipolar disorder.  Patient had trialed multiple mood stabilizers and had experienced side effects from any of them.  -Lithium, unable to continue due to noncompliance with follow-ups for blood levels  -Depakote, had experience drowsiness from higher doses   -Latuda, EPS reaction  -Gabapentin, causes drowsiness  Willing to retryl-Depakote at a low dose.  Discussed with patient prior psychiatrist Dr. Mimi Eaton who will send me a list of mood stabilizers the patient has trialed in the past.  She agreed with trial of Depakote and possibly considering Keppra in the future if needed and Depakote does not work out  -Counseled patient about side effects from Depakote: Liver problems can occur with this drug and sometimes they can be deadly.  Most of the time liver problems happen within the first 6 months after starting the medication.  Patient was advised to call the clinic or go to an emergency room right away if he has signs of liver problems, dark urine, upset stomach, stomach pain, light-colored stools, throwing up, yellowing of the skin or eyes.  -Liver enzymes and renal profile will be requested on the next clinic appointment in 4 weeks.  -Reviewed prior levels of liver enzymes and kidney function levels done in 2018 and were within normal limits-  - divalproex (DEPAKOTE ER) 500 MG 24 hour tablet; Take 1 tablet (500 mg total) by mouth at bedtime.  Dispense: 30 tablet; Refill: 2  -Titration of Depakote typically occurs every 3 to 5 days up to a therapeutic dose of 1.5 to 2 g daily  -Patient agreed to call the clinic and let us know how the medications is working within 3 to 5 days.  3. Adjustment disorder with anxiety  DC Vistaril due to not sufficient improvement  of anxiety  - propranolol (INDERAL) 10 MG tablet; Take 1 tablet (10 mg total) by mouth 3 (three) times a day as needed.  Dispense: 90 tablet; Refill: 2

## 2021-06-06 NOTE — TELEPHONE ENCOUNTER
"PATIENT STOPPED IN CLINIC STATING THAT HE HAD TALKED WITH YOU BEFORE GRADUATING PROGRAM AND WAS UNDER THE UNDERSTANDING THAT HE COULD COME AND  A LETTER THAT HE WOULD NEED FOR COURT.  HIS COURT DATE IS TOMORROW, MARCH 12, AT 0900.  HE STATES \"SHE COACHED ME ON WHAT I NEED TO DO - SHE WILL KNOW WHAT I NEED\".  ASKING FOR A CALLBACK TODAY  "

## 2021-06-06 NOTE — TELEPHONE ENCOUNTER
Pt called back to follow up on voicemail. (Wrong number was previously listed.) Pt requested to speak to LPN regarding his medication/insurance coverage questions, as he had been speaking to her about it previously. Pt transferred to BETTINA Peck.

## 2021-06-19 NOTE — LETTER
Letter by Denisha Duval MD at      Author: Denisha Duval MD Service: -- Author Type: --    Filed:  Encounter Date: 11/19/2019 Status: Incomplete       To Whom it May Concern    Willie Dougherty requested that I write a letter explaining his mental disability.    Willie Dougherty is under my care for treatment of mental illness and chemical dependency since June 2019.  He has a history of long-standing bipolar disorder and is on Social Security disability due to it for the last 15 years.  He is in the process of adjusting and tapering of his psychotropic medications and has regular clinic follow-ups for medication management with me.  He is also receiving psychotherapy services in our clinic.    He is currently participating in intensive outpatient MI CD programming at [facility]. He has completed 85 phase I & II program hours and 16/24 phase III hours . Projected discharge date is 01/01/2020.    Please do not hesitate to contact me should you need any additional information.        Respectfully,

## 2021-06-20 NOTE — LETTER
Letter by Raymond Rivera LICSW at      Author: Raymond Rivera LICSW Service: -- Author Type: --    Filed:  Encounter Date: 1/23/2020 Status: (Other)         Willie Dougherty  [address]  [address]  [place] [address]                 January 23, 2020       Dear Mr. Dougherty,    This letter is to let you know that I will be leaving my psychotherapist position at [facility]. My last day will be January 24, 2020. I have enjoyed working with you, and I regret any inconvenience this change may cause you.     You have several options for continuing psychotherapy with another provider should you wish to do so. To schedule with a provider at a [facility] (including [facility] clinics), call [phone]. To schedule with other community mental health providers, you may use the central scheduling service [facility]) by calling [phone]. Of course you may also call any mental healthcare clinic or independent provider directly using the referral network provided by your insurance company or by searching yourself online or using a directory service.      It has been a pleasure to work with you. Whether or not I see you again, I wish you good health now and always.     Sincerely,     Electronically signed by DAVE Woodard

## 2021-06-27 NOTE — PROGRESS NOTES
Progress Notes by Cecille Candelaria LADC at 2019 11:00 AM     Author: Cecille Candelaria LADC Service: -- Author Type: Licensed Alcohol and Drug Counselor    Filed: 2019 11:57 AM Encounter Date: 2019 Status: Attested    : Cecille Candelaria LADC (Licensed Alcohol and Drug Counselor) Cosigner: Denisha Duval MD at 2019  1:21 PM    Attestation signed by Denisha Duval MD at 2019  1:21 PM      Note reviewed and agree with above.                Individual Treatment Plan [facility]     Patient  Name: Willie Dougherty  MRN: [id number]   : 1960  Admit Date: 19  Date of Initial Service Plan: 19  Tentative Discharge Date: 10/11/19  Counselor: ROSARIO Gilmore  Diagnoses: Alcohol Use Disorder, Severe (F10.20)    Dimension 1: Acute Intoxication/Withdrawal Potential, Risk level: 0  Problem Statement from Comprehensive Assessment:   Patient reports a history of regular alcohol use. He states that he was recently discharged from residential treatment at [facility]. Patient denies any alcohol use since being out of treatment. He reports his last use of alcohol was in 2019. Patient denies any current or past withdrawal symptoms. He shows no physical signs or symptoms of intoxication or withdrawal. Patient is oriented x4.    Problem: Patient reports date of last use of Alcohol to be 2019  Goal: Maintain abstinence  Must be reached to complete treatment? Yes  Methods/Strategies (must include amount and frequency):   1. Attend group Monday, Wednesday and Friday 8:30am-11:30pm. Share thoughts, feelings, and urges to use.   2. Report any relapses to counselor immediately.  Target Date: 10/11/19  Completion Date:       Dimension 2: Biomedical Conditions/Complications, Risk level: 0  Problem Statement from Comprehensive Assessment:  Patient denies any current health issues or concerns. He does not currently have a PCP, but appears able to get  his medical needs met and addressed as necessary. Patient denies any immediate medical needs or concerns. Patient appears to be in adequate physical health and functioning at this time.       Problem: Patient reports no current health issues or concerns.   Goal: Patient to maintain good physical health.   Must be reached to complete treatment? yes  Methods/Strategies (must include amount and frequency):   1. Obtain a Primary Car Provider.   2. Continue to follow recommendations from your personal care provider regarding medical concerns.   3. Inform staff immediately of any changes in your health that may affect your active participation in group therapy or attendance.   Target Date: 10/11/19  Completion Date:     Problem: Patient is required to meet with provider in the clinic. Medicare appointment completed on 6/26/19.   Goal: Patient will follow-up with Dr. Gibbs for Medicare appointment in the [facility] as directed.  Must be reached to completed treatment? Yes  Methods/Strategies (must include amount and frequency):   1. Patient will meet with Dr. Gibbs as scheduled to go over treatment plan and individual needs as required.   2. Patient will attend follow-up if MD requires.   Target Date: 10/11/19  Completion Date:     Dimension 3: Emotional/Behavioral/Cognitive, Risk level: 1  Problem Statement from Comprehensive Assessment:  Patient reports mental health diagnoses of depression and bipolar disorder. Patient does not currently have any mental health services as he was recently discharged from residential treatment. He does endorse that he will be seeking psychiatry services. Patient endorses medication to help manage his mental health symptoms. Patient denies any suicidal or homicidal thoughts or plans. Patient is oriented x4.    Problem: Patient has a diagnoses of depression, and bipolar disorder.  Goal: Manage mental health symptoms   Must be reached to complete treatment? Yes  Methods/Strategies (must  include amount and frequency):   1. Patient to explore mental health services with counselor, and obtain services as appropriate.   2. Patient to continue taking all mental health medication as prescribed.   3. Patient to report any new or worsening mental health symptoms to his counselor immediately.   Target Date: 10/11/19  Completion Date:     Dimension 4: Readiness to Change, Risk level 1  Problem Statement from Comprehensive Assessment:  Patient reports that he does feel that he is required to be here for treatment, and he would have liked a less intensive program. Patient does verbalize a desire to change his substance use at this time. Patient does appear ambivalent about his need for continued treatment at this time. He does verbalize willingness to follow treatment recommendations at this time. Patient was calm, cooperative, and appropriately behaved throughout this appointment.     Problem: Patient is attending treatment due to probation requirement.   Goal: Follow through with intentions to treat chemical dependency concerns while meeting OP treatment expectations in order to graduate successfully from the program.   Must be reached to complete treatment? Yes   Methods/Strategies (must include amount and frequency):   1. Complete all requested assignments, participate in group and follow through with aftercare plans.   2. If for any reason you will not be in group or will be tardy please contact staff at [phone] ([place])   Target Date: 10/11/19  Completion Date:     Dimension 5: Relapse/Continued Use/Continued Problem Potential, Risk level: 3  Problem Statement from Comprehensive Assessment:  Patient reports that he just completed his first treatment. He therefore appears to have some knowledge of addiction, but could benefit from additional education. Patient appears to lack some insight into his use and the impact it had on his life as he appears to minimize his need for treatment and change  despite the negative consequences he has experienced. Patient appears to lack the necessary coping skills at this time to help him prevent relapse as he reports just having gotten out of residential treatment, and minimal periods of sobriety recently. Patient therefore appears to currently be at an increased risk of relapse.     Problem: Patient lacks relapse prevention skills.   Goal: Patient to gain skills to maintain sobriety.    Must be reached to complete treatment?  Yes  Methods/Strategies (must include amount and frequency):   1. Patient to identify at least 5 personal triggers and high-risk situations for relapse.  2. Patient to learn and implement at least 5 new personal coping strategies to manage urges to use.   3. Patient to identify 5 needs that were met by substance use.   Target Date: 10/11/19  Completion Date:     Dimension 6: Recovery Environment, Risk level: 3  Problem Statement from Comprehensive Assessment:  Patient is currently unemployed, and so appears to lack structure for his daily life. He was able to identify hobbies and activities he enjoys, but it did not appear that he was actively engaging in them prior to going to treatment. Patient reports that he is currently living at a sober house, and so has some support from his living environment. Patient reports that his family is supportive of his sobriety, and about half of his friends are as well. Some of the patient's friends could increase his risk of relapse as they drank together historically. Patient endorses pending legal consequences from a DWI that happened in August 2018. He denies any other current legal involvement.     Problem: Lack of sober support   Goal: Attend sober support meetings at minimum of 2x per week.   Must be reached to complete treatment? yes  Methods/Strategies (must include amount and frequency):   1. Explore and identify sober support meetings to regularly attend.   2. Obtain a sponsor prior to phase II of  treatment.   3. Patient to develop weekly schedule of activities, and include other activities that you think will benefit your recovery. Examples could be scheduled time with family, working out, or other activities that you find enjoyable.  Target Date: 10/11/19  Completion Date:     Problem: On probation for a DWI charge   Goal: Comply with Probation  Must be reached to complete treatment? Yes   Methods/Strategies (must include amount and frequency):   1. Patient to attend all scheduled probation meetings.  2. Patient to comply with UA requests.  3. Patient to follow probation requirements and recommendations.   4. Patient to inform primary counselor immediate of any changes to probation.    Target Date: 10/11/19  Completion Date:        Resources  Resources to which the patient is being referred for problems when problems are to be addressed concurrently by another provider: Supervising MD, and all other providers as identified throughout treatment.       By signing this document, I am acknowledging that I was actively and directly involved in the development of my treatment plan.           Patient  Signature_________________________________________         Date__________________        Staff Signature  ROSARIO Gilmore    Date: 7/2/2019, 11:00 AM

## 2021-06-27 NOTE — PROGRESS NOTES
Progress Notes by Denisha Duval MD at 8/21/2019 11:45 AM     Author: Denisha Duval MD Service: -- Author Type: Physician    Filed: 8/21/2019 12:43 PM Encounter Date: 8/21/2019 Status: Signed    : Denisha Duval MD (Physician)       Addiction Medicine  Outpatient Visit  With Dr. Gibbs    8/21/2019    Willie Dougherty, 1960.    Subjective   The patient is a 59 year old male who presents for alcohol use disorder and last saw Dr. Gibbs on 7/24/19.  Patient participates in intensive outpatient programming at [facility].  He reports being sober since January 2019.  He resides in a sober house, [facility] in [place].  He is compliant with treatment and has good attendance however feels that this treatment is not necessary but he is trying to fulfill a court recommendation.  Patient is here to follow-up on his psychotropic medications which include  Patient medication dose and name: wellbutrin 150mg XL daily and gabapentin 200 mg at at bedtime.  Previously patient was treated for depression and anxiety and has been prescribed lorazepam for a long time for sleep and anxiety.  No lorazepam prescription since January 2018.  Patient continues to ask for a medication for breakthrough anxiety or insomnia and I had prescribed gabapentin 200 mg at at bedtime for him.  He reports that gabapentin is not helpful and he believes he is getting some facial twitching from it.  He would like to discontinue that.  Previously a psychiatrist in [facility] had diagnosed him with bipolar disorder and treated him with Lamictal.  These records are not available.  Patient's symptoms currently are more significant for mild to moderate anxiety and depression.  He denies any mood swings, denies mood elevation, racing thoughts, insomnia, he is not hyperverbal, hypersexual, his energy is not higher than normal, he does not have trouble with spending excessively.  It  "is possible that his diagnosis of bipolar disorder may have been falls due to substance abuse in the past.  At this time, patient would like to : Discussmedications  PHQ-9 score past 5 today 4  ERROL-7 score past 4 today 4   AMRITA: none       Last UDS performed on 7/24/19 and was: neg    Last ETG performed on 7/24/19 and was: neg     UA collected today      Have you maintained sobriety from all substances?  If not what is your problem substance and frequency?  yes      What does patient currently fills his/her day with? (working, volunteering, TV)  MICD 2 days weekly, going on boat     Are you having any cravings?  From 0 to 5 (zero being none) grade your craving. What is the craving for?  0-1       Are you going to groups, if so where and how often? What group?  MICD group and groups at sober house     Side effects:  Dry mouth denies Constipation denies     Do you follow with a primary care doctor? When was your last visit?   no      Patient reports main support person is:  family     Is patient currently experiencing depression or thoughts of self-harm?   Yes, some although improving slightly, denies SIHI     Is patient having trouble with functioning because of worrying about things?  Yes, concerned about possiblity of med causing \"edgyness\"      Are any of the people in your life expressing concern for you?    no     Is there anything you would like to ask your doctor about?   Discuss medications         Social History:  Patient lives with roomates, in a sober house, attending MICD groups     Patient has no obstacles to maintain sobriety.    Allergies:  Patient has no known allergies.      Medications:  Current Outpatient Medications   Medication Sig Dispense Refill   ? buPROPion (WELLBUTRIN XL) 150 MG 24 hr tablet Take 1 tablet (150 mg total) by mouth daily. 30 tablet 0   ? gabapentin (NEURONTIN) 100 MG capsule Take 200 mg by mouth at bedtime. 60 capsule 2   ? omeprazole (PRILOSEC) 20 MG capsule Take 1 capsule (20 " "mg total) by mouth daily. 30 capsule 0     No current facility-administered medications for this visit.          Review of Systems:  Patient  denies chest pain or shortness of breath. Patient is not having fever or fatigue.       Physical Examination:  /89   Pulse (!) 101   Ht 5' 6\" (1.676 m)   Wt 165 lb (74.8 kg)   BMI 26.63 kg/m    Physical Exam  There were no signs suggesting medication toxicity or impairment due to drug or alcohol use. Patient is not overtly manipulative. Gait intact.      Mental Status Examination    Grooming, Dress & Hygeine  Yes No  Comments    Normal x    Good hygiene and grooming.  No acute distress, awake alert oriented for time place person and situation    Attitude      cooperative    Cooperative x        Mood      anxious and flat    Euthymic    X      Affect          Full range x        Normal intensity x        Reactive x        Eye Contact      appropriate    Normal amount x        Speech      answers questions appropriately, no flight of ideas, or hyper verbal communication     Normal volume, rate and amount x        Verbal Fluency          Normal phonemic x        Normal semantic X        Fund of Knowledge      limited    Average X        Thought Content      denies thoughts of hurting self or others    Normal X        Thought Processes      linear and logical    Normal X        Perceptions      denies auditory or visual hallucinations    Normal X        Insight & Judgment      limited    Recognition of illness X    gaining insight into his chronic brain disease    Readiness to change X    in action stage of recovery    Taking steps to manage illness X                 [place] Prescription Monitoring Program:  Reviewed, no worrisome activity was noted.    Summary of Diagnostic Studies:  Urine toxicology pending at the time this note was completed.      Assessment    1. Severe alcohol use disorder (H)    2. Alcohol use disorder, severe, dependence (H)    3. " Alcohol-induced insomnia (H)    4. Gastroesophageal reflux disease, esophagitis presence not specified    5. Mood disorder, drug-induced (H)    6. Adjustment disorder with anxiety        Diagnoses/Plan:    1. Severe alcohol use disorder (H)  -Follow recommendations for treatment per her counselor  - DAM    (Drug Abuse 1+, Urine)  - buPROPion (WELLBUTRIN XL) 300 MG 24 hr tablet; Take 1 tablet (300 mg total) by mouth daily.  Dispense: 30 tablet; Refill: 3  - Ethyl Glucuronide and Ethyl Sulfate, Urine, Quantitative ([facility] ETG/S)    3. Alcohol-induced insomnia (H)  -Reports that his sleep is good.  May discontinue gabapentin.    4. Gastroesophageal reflux disease, esophagitis presence not specified  -Advised to follow-up with the primary care provider  - omeprazole (PRILOSEC) 20 MG capsule; Take 1 capsule (20 mg total) by mouth daily.  Dispense: 30 capsule; Refill: 3    5. Mood disorder, drug-induced (H)  6. Adjustment disorder with anxiety  Patient reports prior diagnosis of bipolar disorder and treatment with Lamictal.  This has been done in [facility] and his psychiatrist at the time has retired.  No records are available.  Currently patient symptoms are not consistent with bipolar disorder but mostly with more of depression and anxiety most likely related to alcohol abuse.    - Ambulatory referral to Psychology for diagnostic assessment and recommendations for treatment    -Will increase Wellbutrin XL from 150 to 300 mg daily.  Patient was advised that if he really has bipolar disorder Wellbutrin may precipitate a manic episode.  Since this is not happening and on the contrary he continues to be depressed and anxious I am willing to try an increase in Wellbutrin and follow-up with him in 4 to 6 weeks.    - hydrOXYzine pamoate (VISTARIL) 50 MG capsule; Take 1 capsule (50 mg total) by mouth 3 (three) times a day as needed for anxiety.  Dispense: 90 capsule; Refill: 0     At least 25 min, was spent in the care of  this patient and >50% of this time was spent in face-to-face counseling and coordination of care

## 2021-06-27 NOTE — PROGRESS NOTES
Progress Notes by Mayte Shah LADC at 2019 10:13 AM     Author: Mayte Shah LADC Service: -- Author Type: Licensed Alcohol and Drug Counselor    Filed: 2019 10:52 AM Encounter Date: 2019 Status: Attested    : Mayte Shah LADC (Licensed Alcohol and Drug Counselor) Cosigner: Denisha Duval MD at 2019 11:44 AM    **Sensitive Note**    Attestation signed by Denisha Duval MD at 2019 11:44 AM    Patient seen and evaluated by me as well.  Note reviewed and agree with above.                Individual Treatment Plan Senior Recovery Program     Patient  Name: Willie Dougherty  MRN: [id number]   : 1960  Admit Date: 19  Date of Initial Service Plan: 19  Tentative Discharge Date: 19  Counselor: ROSARIO Baird  Diagnoses: Alcohol Use Disorder, Severe (F10.20)    Dimension 1: Acute Intoxication/Withdrawal Potential, Risk level: 0  Problem Statement from Comprehensive Assessment:   Patient reports a history of regular alcohol use. He states that he was recently discharged from residential treatment at [facility]. Patient denies any alcohol use since being out of treatment. He reports his last use of alcohol was in 2019. Patient denies any current or past withdrawal symptoms. He shows no physical signs or symptoms of intoxication or withdrawal. Patient is oriented x4.    Problem: Patient reports date of last use of Alcohol to be 2019  Goal: Maintain abstinence  Must be reached to complete treatment? Yes  Methods/Strategies (must include amount and frequency):   1. Attend group Monday and Friday 8:30am-11:30pm. Share thoughts, feelings, and urges to use.   2. Report any relapses to counselor immediately.  Target Date: 10/11/19  Completion Date:     Dimension 2: Biomedical Conditions/Complications, Risk level: 0  Problem Statement from Comprehensive Assessment:  Patient denies any current health issues or  concerns. He does not currently have a PCP, but appears able to get his medical needs met and addressed as necessary. Patient denies any immediate medical needs or concerns. Patient appears to be in adequate physical health and functioning at this time.     Problem: Patient reports no current health issues or concerns.   Goal: Patient to maintain good physical health.   Must be reached to complete treatment? yes  Methods/Strategies (must include amount and frequency):   1. Obtain a Primary Car Provider.   2. Continue to follow recommendations from your personal care provider regarding medical concerns.   3. Inform staff immediately of any changes in your health that may affect your active participation in group therapy or attendance.   Target Date: 10/11/19  Completion Date:     Problem: Patient is required to meet with provider in the clinic. Medicare appointment completed on 6/26/19.   Goal: Patient will follow-up with Dr. Gibbs for Medicare appointment in the [facility] as directed.  Must be reached to completed treatment? Yes  Methods/Strategies (must include amount and frequency):   1. Patient will meet with Dr. Gibbs as scheduled to go over treatment plan and individual needs as required.   2. Patient will attend follow-up if MD requires.   Target Date: 10/11/19  Completion Date:     Dimension 3: Emotional/Behavioral/Cognitive, Risk level: 1  Problem Statement from Comprehensive Assessment:  Patient reports mental health diagnoses of depression and bipolar disorder. Patient does not currently have any mental health services as he was recently discharged from residential treatment. He does endorse that he will be seeking psychiatry services. Patient endorses medication to help manage his mental health symptoms. Patient denies any suicidal or homicidal thoughts or plans. Patient is oriented x4.    Problem: Patient has a diagnoses of depression, and bipolar disorder.  Goal: Manage mental health symptoms   Must be  reached to complete treatment? Yes  Methods/Strategies (must include amount and frequency):   1. Patient to explore mental health services with counselor, and obtain services as appropriate.   2. Patient to continue taking all mental health medication as prescribed.   3. Patient to report any new or worsening mental health symptoms to his counselor immediately.   Target Date: 10/11/19  Completion Date:     Dimension 4: Readiness to Change, Risk level 0  Problem Statement from Comprehensive Assessment:  Patient reports that he does feel that he is required to be here for treatment, and he would have liked a less intensive program. Patient does verbalize a desire to change his substance use at this time. Patient does appear ambivalent about his need for continued treatment at this time. He does verbalize willingness to follow treatment recommendations at this time. Patient was calm, cooperative, and appropriately behaved throughout this appointment.   Update: The patient is an active and engaged group member. He has completed phase I of the senior recovery program and is now in phase II. He is meeting treatment goals and expectations.     Problem: Patient is attending treatment due to probation requirement.   Goal: Follow through with intentions to treat chemical dependency concerns while meeting OP treatment expectations in order to graduate successfully from the program.   Must be reached to complete treatment? Yes   Methods/Strategies (must include amount and frequency):   1. Complete all requested assignments, participate in group and follow through with aftercare plans.   2. If for any reason you will not be in group or will be tardy please contact staff at [phone] (Mayte)   Target Date: 10/11/19  Completion Date:     Dimension 5: Relapse/Continued Use/Continued Problem Potential, Risk level: 3  Problem Statement from Comprehensive Assessment:  Patient reports that he just completed his first treatment.  He therefore appears to have some knowledge of addiction, but could benefit from additional education. Patient appears to lack some insight into his use and the impact it had on his life as he appears to minimize his need for treatment and change despite the negative consequences he has experienced. Patient appears to lack the necessary coping skills at this time to help him prevent relapse as he reports just having gotten out of residential treatment, and minimal periods of sobriety recently. Patient therefore appears to currently be at an increased risk of relapse.     Problem: Patient lacks relapse prevention skills.   Goal: Patient to gain skills to maintain sobriety.    Must be reached to complete treatment?  Yes  Methods/Strategies (must include amount and frequency):   1. Patient to identify at least 5 personal triggers and high-risk situations for relapse.  2. Patient to learn and implement at least 5 new personal coping strategies to manage urges to use.   3. Patient to identify 5 needs that were met by substance use.   Target Date: 10/11/19  Completion Date:     Dimension 6: Recovery Environment, Risk level: 3  Problem Statement from Comprehensive Assessment:  Patient is currently unemployed, and so appears to lack structure for his daily life. He was able to identify hobbies and activities he enjoys, but it did not appear that he was actively engaging in them prior to going to treatment. Patient reports that he is currently living at a sober house, and so has some support from his living environment. Patient reports that his family is supportive of his sobriety, and about half of his friends are as well. Some of the patient's friends could increase his risk of relapse as they drank together historically. Patient endorses pending legal consequences from a DWI that happened in August 2018. He denies any other current legal involvement.     Problem: Lack of sober support   Goal: Attend sober support meetings  at minimum of 2x per week.   Must be reached to complete treatment? yes  Methods/Strategies (must include amount and frequency):   1. Explore and identify sober support meetings to regularly attend.   2. Obtain a sponsor prior to phase II of treatment.   3. Patient to develop weekly schedule of activities, and include other activities that you think will benefit your recovery. Examples could be scheduled time with family, working out, or other activities that you find enjoyable.  Target Date: 10/11/19  Completion Date:     Problem: On probation for a DWI charge   Goal: Comply with Probation  Must be reached to complete treatment? Yes   Methods/Strategies (must include amount and frequency):   1. Patient to attend all scheduled probation meetings.  2. Patient to comply with [facility] requests.  3. Patient to follow probation requirements and recommendations.   4. Patient to inform primary counselor immediate of any changes to probation.    Target Date: 10/11/19  Completion Date:        Resources  Resources to which the patient is being referred for problems when problems are to be addressed concurrently by another provider: Supervising MD, and all other providers as identified throughout treatment.       By signing this document, I am acknowledging that I was actively and directly involved in the development of my treatment plan.           Patient  Signature_________________________________________         Date__________________        Staff Signature  ROSARIO Baird    Date: 8/27/2019, 10:25 AM

## 2021-06-28 NOTE — PROGRESS NOTES
Progress Notes by Mayte Shah LADC at 2020  2:16 PM     Author: Mayte Shah LADC Service: -- Author Type: Licensed Alcohol and Drug Counselor    Filed: 2020  2:36 PM Encounter Date: 2020 Status: Attested    : Mayte Shah LADC (Licensed Alcohol and Drug Counselor) Cosigner: Denisha Duval MD at 2020 10:31 AM    **Sensitive Note**    Attestation signed by Denisha Duval MD at 2020 10:31 AM    Patient seen and evaluated by me as well.  Note reviewed and agree with above.                   OUTPATIENT SUBSTANCE USE DISORDER TREATMENT  DISCHARGE SUMMARY      Name:  Willie Dougherty   :  ROSARIO Baird   Admit Date: 2019   Discharge Date: 2020   :  1960   Hours Completed: 124   Initial Diagnosis:  Alcohol Use Disorder, severe (F10.20)   Final Diagnosis:  Alcohol Use Disorder, Severe, in Remission (F10.20)   Discharge Address:    [address] [place] [address]   Funding Source:    Medicare A[employer]B/[employer]     Discharge Type:  With Staff Approval ([id number])    Client was receiving residential services at the time of discharge:   No    Reasons for and circumstances of service termination:  The patient completed the senior recovery program at [facility]. The patient met all treatment expectations and was an active and engaged group member. At this time the patient has met the maximum therapeutic benefit of the group.      If program discharge status was At Staff Request, the license schafer must identify the following:    Other interested parties conferred with: not applicable    Referrals provided: not applicable    Alternatives considered and attempted before deciding to discharge:  not applicable    Dimension/Course of Treatment/Individualized Care:   1.  Withdrawal Potential - Intake Risk level -  0 Discharge Risk level - 0  Narrative supporting risk description:  The patient has not  reported any substance use since January of 2019. At this time the patients alcohol use disorder is in remission. Patient did not endorse any relapses while enrolled so was not at any risk of withdrawal.   Treatment plan goals and progress towards those goals:  The patient goal was to maintain abstinence and he was successful in this goal.    2.  Biomedical Conditions and Complications - Intake Risk level -  0 Discharge Risk level - 0  Narrative supporting risk description:  The patient did not endorse any biomedical concerns. He reports no primary care providers however does have knowledge as to how to gain medical attention if needed. Patient did seek dental work while enrolled and found benefit in that.   Treatment plan goals and progress towards those goals:  Patient goal was to maintain stable health. The patient was successful in this as he did not report any emergent biomedical concerns while enrolled.   3.  Emotional/Behavioral/Cognitive Conditions and Complications - Intake Risk level -  1 Discharge Risk level - 1  Narrative supporting risk description:  The patient has mental health diagnoses of depression and bipolar disorder. Patient reports he is taking medications as prescribed and finding benefit in them. Patient sees Dr. Gibbs for medication management at [facility]. Patient is engaged in individual therapy with DAVE Rey and reports intent to continue to see him. Patient did not endorse any suicidal or homicidal plans or thoughts while enrolled.   Treatment plan goals and progress towards those goals:  Patient goal was to manage mental health. He has been successful in this as he has remained medication compliant and continues to seek his mental health providers out.    4.  Readiness for Change - Intake Risk level -  1 Discharge Risk level - 0  Narrative supporting risk description:  The patient was an active and engaged group member  "who met all group expectations. Patient completed all three phases of the group process while engaging in the group setting. The patient remained in the action stage of change.   Treatment plan goals and progress towards those goals:  The patient goal was to comply with group expectations. He was successful in this as he completed all phases of groups successfully and engaged appropriately.    5.  Relapse/Continued Use/Continued Problem Potential - Intake Risk level -  3 Discharge Risk level - 1  Narrative supporting risk description:  The patient has been able to develop and implement coping skills. He has been able to identify how his coping skills are beneficial and the impact they have had on his sobriety. Patient has identified what some of his triggers are as well as skills to work through them.   Treatment plan goals and progress towards those goals:  The patient goal was to develop coping skills and implement them. He has been successful in this and has been able to create a relapse prevention plan based off of this.   6.  Recovery Environment - Intake Risk level -  3 Discharge Risk level - 2  Narrative supporting risk description:  The patient has current pending charges however has been compliant with the expectations of the court system. Patient has a safe and supportive housing environment however is looking to get into an individual housing unit and has the resources to do so. Patient is retired and unemployed however he has been able to identify some sober and structured activities to engage in. Patient has sober support from family and peers.   Treatment plan goals and progress towards those goals:  Patient goal was to develop sober support and structured activities. He has been successful in this as evidence by his engagement in community and peer supports and community activities.    Strengths: Patient identified \"I'm a good listener, honest, compassionate, and intelligent.\"   Needs: Continued " individual therapy, medication management, continued community supports.    Services Provided: Intake, assessment, treatment planning, education, group discussion, film, lectures, 1x1 therapy, and recommendations at discharge.      Program Involvement: Good  Attendance: Good  Ability to relate in group/   Other program activities: Good  Assignment Completion: Good  Overall Behavior: Good  Reported Family/Significant   Other Involvement: NA    Prognosis: Good      Recommendations       Identify and Maintain a Sober Social, Network of Friends and Engage in sober supportive activities.     Mental Health Referral  Individual Therapy and Med Compliance    Physical Health Referral:    Obtain a primary care provider.     Counselor Name and Title:  ROSARIO Baird        Date:  1/31/2020  Time:  2:16 PM

## 2021-06-28 NOTE — PROGRESS NOTES
Progress Notes by Mayte Shah LADC at 10/8/2019  8:06 AM     Author: Mayte Shah LADC Service: -- Author Type: Licensed Alcohol and Drug Counselor    Filed: 10/8/2019  8:20 AM Encounter Date: 10/8/2019 Status: Attested    : Mayte Shah LADC (Licensed Alcohol and Drug Counselor) Cosigner: Denisha Duval MD at 10/9/2019  1:05 PM    **Sensitive Note**    Attestation signed by Denisha Duval MD at 10/9/2019  1:05 PM      Note reviewed and agree with above.                 Individual Treatment Plan [facility] Recovery Program     Patient  Name: Willie Dougherty  MRN: [id number]   : 1960  Admit Date: 19  Date of Initial Service Plan: 19  Tentative Discharge Date: 19  Counselor: ROSARIO Baird  Diagnoses: Alcohol Use Disorder, Severe (F10.20)    Dimension 1: Acute Intoxication/Withdrawal Potential, Risk level: 0  Problem Statement from Comprehensive Assessment:   Patient reports a history of regular alcohol use. He states that he was recently discharged from residential treatment at [facility]. Patient denies any alcohol use since being out of treatment. He reports his last use of alcohol was in 2019. Patient denies any current or past withdrawal symptoms. He shows no physical signs or symptoms of intoxication or withdrawal. Patient is oriented x4.  Update: The patient reports no use since engaging in treatment services.     Problem: Patient reports date of last use of Alcohol to be 2019  Goal: Maintain abstinence  Must be reached to complete treatment? Yes  Methods/Strategies (must include amount and frequency):   1. Attend group Tuesday 8:30am-10:30pm. Share thoughts, feelings, and urges to use.   2. Report any relapses to counselor immediately.  Target Date: 19  Completion Date:     Dimension 2: Biomedical Conditions/Complications, Risk level: 0  Problem Statement from Comprehensive Assessment:  Patient denies any  current health issues or concerns. He does not currently have a PCP, but appears able to get his medical needs met and addressed as necessary. Patient denies any immediate medical needs or concerns. Patient appears to be in adequate physical health and functioning at this time.     Problem: Patient reports no current health issues or concerns.   Goal: Patient to maintain good physical health.   Must be reached to complete treatment? yes  Methods/Strategies (must include amount and frequency):   1. Obtain a Primary Car Provider.   2. Continue to follow recommendations from your personal care provider regarding medical concerns.   3. Inform staff immediately of any changes in your health that may affect your active participation in group therapy or attendance.   Target Date: 12/17/19  Completion Date:     Problem: Patient is required to meet with provider in the clinic. Medicare appointment completed on 6/26/19.   Goal: Patient will follow-up with Dr. Gibbs for Medicare appointment in the [facility] as directed.  Must be reached to completed treatment? Yes  Methods/Strategies (must include amount and frequency):   1. Patient will meet with Dr. Gibbs as scheduled to go over treatment plan and individual needs as required.   2. Patient will attend follow-up if MD requires.   Target Date: 12/17/19  Completion Date:     Dimension 3: Emotional/Behavioral/Cognitive, Risk level: 1  Problem Statement from Comprehensive Assessment:  Patient reports mental health diagnoses of depression and bipolar disorder. Patient does not currently have any mental health services as he was recently discharged from residential treatment. He does endorse that he will be seeking psychiatry services. Patient endorses medication to help manage his mental health symptoms. Patient denies any suicidal or homicidal thoughts or plans. Patient is oriented x4.  Update: Patient did complete a DA to identify an updated diagnoses. DAVE Rey  identified a diagnoses of Bipolar Disorder, with depressed mood. Patient is on medications and is compliant     Problem: Patient has a diagnoses of depression, and bipolar disorder.  Goal: Manage mental health symptoms   Must be reached to complete treatment? Yes  Methods/Strategies (must include amount and frequency):   1. Patient to explore mental health services with counselor, and obtain services as appropriate.   2. Patient to continue taking all mental health medication as prescribed.   3. Patient to report any new or worsening mental health symptoms to his counselor immediately.   Target Date: 12/17/19  Completion Date:     Dimension 4: Readiness to Change, Risk level 0  Problem Statement from Comprehensive Assessment:  Patient reports that he does feel that he is required to be here for treatment, and he would have liked a less intensive program. Patient does verbalize a desire to change his substance use at this time. Patient does appear ambivalent about his need for continued treatment at this time. He does verbalize willingness to follow treatment recommendations at this time. Patient was calm, cooperative, and appropriately behaved throughout this appointment.   Update: The patient is an active and engaged group member. He has completed phase I of the senior recovery program and is now in phase II. He is meeting treatment goals and expectations.     Problem: Patient is attending treatment due to probation requirement.   Goal: Follow through with intentions to treat chemical dependency concerns while meeting OP treatment expectations in order to graduate successfully from the program.   Must be reached to complete treatment? Yes   Methods/Strategies (must include amount and frequency):   1. Complete all requested assignments, participate in group and follow through with aftercare plans.   2. If for any reason you will not be in group or will be tardy please contact staff at [phone] (Jose    Target Date: 12/17/19  Completion Date:     Dimension 5: Relapse/Continued Use/Continued Problem Potential, Risk level: 2  Problem Statement from Comprehensive Assessment:  Patient reports that he just completed his first treatment. He therefore appears to have some knowledge of addiction, but could benefit from additional education. Patient appears to lack some insight into his use and the impact it had on his life as he appears to minimize his need for treatment and change despite the negative consequences he has experienced. Patient appears to lack the necessary coping skills at this time to help him prevent relapse as he reports just having gotten out of residential treatment, and minimal periods of sobriety recently. Patient therefore appears to currently be at an increased risk of relapse.   Update: Patient has identified coping skills to implement and has been successful in this.     Problem: Patient lacks relapse prevention skills.   Goal: Patient to gain skills to maintain sobriety.    Must be reached to complete treatment?  Yes  Methods/Strategies (must include amount and frequency):   1. Patient to identify at least 5 personal triggers and high-risk situations for relapse.  2. Patient to learn and implement at least 5 new personal coping strategies to manage urges to use.   3. Patient to identify 5 needs that were met by substance use.   Target Date: 12/17/19  Completion Date:     Dimension 6: Recovery Environment, Risk level: 3  Problem Statement from Comprehensive Assessment:  Patient is currently unemployed, and so appears to lack structure for his daily life. He was able to identify hobbies and activities he enjoys, but it did not appear that he was actively engaging in them prior to going to treatment. Patient reports that he is currently living at a sober house, and so has some support from his living environment. Patient reports that his family is supportive of his sobriety, and about half of his  friends are as well. Some of the patient's friends could increase his risk of relapse as they drank together historically. Patient endorses pending legal consequences from a DWI that happened in August 2018. He denies any other current legal involvement.   Update: At this time the patient is working on identifying new housing options. This writer has been assisting him in this.     Problem: Lack of sober support   Goal: Attend sober support meetings at minimum of 2x per week.   Must be reached to complete treatment? yes  Methods/Strategies (must include amount and frequency):   1. Explore and identify sober support meetings to regularly attend.   2. Obtain a sponsor prior to phase II of treatment.   3. Patient to develop weekly schedule of activities, and include other activities that you think will benefit your recovery. Examples could be scheduled time with family, working out, or other activities that you find enjoyable.  Target Date: 12/17/19  Completion Date:     Problem: On probation for a DWI charge   Goal: Comply with Probation  Must be reached to complete treatment? Yes   Methods/Strategies (must include amount and frequency):   1. Patient to attend all scheduled probation meetings.  2. Patient to comply with UA requests.  3. Patient to follow probation requirements and recommendations.   4. Patient to inform primary counselor immediate of any changes to probation.    Target Date: 12/17/19  Completion Date:      Resources  Resources to which the patient is being referred for problems when problems are to be addressed concurrently by another provider: Supervising MD, and all other providers as identified throughout treatment.       By signing this document, I am acknowledging that I was actively and directly involved in the development of my treatment plan.           Patient  Signature_________________________________________         Date__________________        Staff Signature  ROSARIO Baird     Date: 10/8/2019, 10:25 AM

## 2021-06-28 NOTE — PROGRESS NOTES
Progress Notes by Mayte Shah LADC at 2020  8:59 AM     Author: Mayte Shah LADC Service: -- Author Type: Licensed Alcohol and Drug Counselor    Filed: 2020  9:25 AM Encounter Date: 2020 Status: Attested    : Mayte Shah LADC (Licensed Alcohol and Drug Counselor) Cosigner: Denisha Duval MD at 2020  9:42 AM    **Sensitive Note**    Attestation signed by Denisha Duval MD at 2020  9:42 AM      Note reviewed and agree with above.                 Individual Treatment Plan [facility] Recovery [facility]     Patient  Name: Willie Dougherty  MRN: [id number]   : 1960  Admit Date: 19  Date of Initial Service Plan: 19  Tentative Discharge Date: 2020  Counselor: ROSARIO Baird  Diagnoses: Alcohol Use Disorder, Severe (F10.20)    Dimension 1: Acute Intoxication/Withdrawal Potential, Risk level: 0  Problem Statement from Comprehensive Assessment:   Patient reports a history of regular alcohol use. He states that he was recently discharged from residential treatment at [facility]. Patient denies any alcohol use since being out of treatment. He reports his last use of alcohol was in 2019. Patient denies any current or past withdrawal symptoms. He shows no physical signs or symptoms of intoxication or withdrawal. Patient is oriented x4.  Update: The patient reports no use since engaging in treatment services.     Problem: Patient reports date of last use of Alcohol to be 2019  Goal: Maintain abstinence  Must be reached to complete treatment? Yes  Methods/Strategies (must include amount and frequency):   1. Attend group Tuesday 8:30am-10:30pm. Share thoughts, feelings, and urges to use.   2. Report any relapses to counselor immediately.  Target Date: 2020  Completion Date:     Dimension 2: Biomedical Conditions/Complications, Risk level: 0  Problem Statement from Comprehensive Assessment:  Patient denies any  current health issues or concerns. He does not currently have a PCP, but appears able to get his medical needs met and addressed as necessary. Patient denies any immediate medical needs or concerns. Patient appears to be in adequate physical health and functioning at this time.     Problem: Patient reports no current health issues or concerns.   Goal: Patient to maintain good physical health.   Must be reached to complete treatment? yes  Methods/Strategies (must include amount and frequency):   1. Obtain a Primary Car Provider.   2. Continue to follow recommendations from your personal care provider regarding medical concerns.   3. Inform staff immediately of any changes in your health that may affect your active participation in group therapy or attendance.   Target Date: 2/4/2020  Completion Date:     Problem: Patient is required to meet with provider in the clinic. Medicare appointment completed on 6/26/19.   Goal: Patient will follow-up with Dr. Gibbs for Medicare appointment in the [facility] as directed.  Must be reached to completed treatment? Yes  Methods/Strategies (must include amount and frequency):   1. Patient will meet with Dr. Gibbs as scheduled to go over treatment plan and individual needs as required.   2. Patient will attend follow-up if MD requires.   Target Date: 2/4/2020  Completion Date:     Dimension 3: Emotional/Behavioral/Cognitive, Risk level: 1  Problem Statement from Comprehensive Assessment:  Patient reports mental health diagnoses of depression and bipolar disorder. Patient does not currently have any mental health services as he was recently discharged from residential treatment. He does endorse that he will be seeking psychiatry services. Patient endorses medication to help manage his mental health symptoms. Patient denies any suicidal or homicidal thoughts or plans. Patient is oriented x4.  Update: Patient did complete a DA to identify an updated diagnoses. DAVE Rey  identified a diagnoses of Bipolar Disorder, with depressed mood. Patient is on medications and is compliant     Problem: Patient has a diagnoses of depression, and bipolar disorder.  Goal: Manage mental health symptoms   Must be reached to complete treatment? Yes  Methods/Strategies (must include amount and frequency):   1. Patient to explore mental health services with counselor, and obtain services as appropriate.   2. Patient to continue taking all mental health medication as prescribed.   3. Patient to report any new or worsening mental health symptoms to his counselor immediately.   Target Date: 2/4/2020  Completion Date:     Dimension 4: Readiness to Change, Risk level 0  Problem Statement from Comprehensive Assessment:  Patient reports that he does feel that he is required to be here for treatment, and he would have liked a less intensive program. Patient does verbalize a desire to change his substance use at this time. Patient does appear ambivalent about his need for continued treatment at this time. He does verbalize willingness to follow treatment recommendations at this time. Patient was calm, cooperative, and appropriately behaved throughout this appointment.   Update: The patient is an active and engaged group member. He has completed phase I of the senior recovery program and is now in phase II. He is meeting treatment goals and expectations.     Problem: Patient is attending treatment due to probation requirement.   Goal: Follow through with intentions to treat chemical dependency concerns while meeting OP treatment expectations in order to graduate successfully from the program.   Must be reached to complete treatment? Yes   Methods/Strategies (must include amount and frequency):   1. Complete all requested assignments, participate in group and follow through with aftercare plans.   2. If for any reason you will not be in group or will be tardy please contact staff at [phone] (Jose    Target Date: 2/4/2020  Completion Date:     Dimension 5: Relapse/Continued Use/Continued Problem Potential, Risk level: 2  Problem Statement from Comprehensive Assessment:  Patient reports that he just completed his first treatment. He therefore appears to have some knowledge of addiction, but could benefit from additional education. Patient appears to lack some insight into his use and the impact it had on his life as he appears to minimize his need for treatment and change despite the negative consequences he has experienced. Patient appears to lack the necessary coping skills at this time to help him prevent relapse as he reports just having gotten out of residential treatment, and minimal periods of sobriety recently. Patient therefore appears to currently be at an increased risk of relapse.   Update: Patient has identified coping skills to implement and has been successful in this.     Problem: Patient lacks relapse prevention skills.   Goal: Patient to gain skills to maintain sobriety.    Must be reached to complete treatment?  Yes  Methods/Strategies (must include amount and frequency):   1. Patient to identify at least 5 personal triggers and high-risk situations for relapse.  2. Patient to learn and implement at least 5 new personal coping strategies to manage urges to use.   3. Patient to identify 5 needs that were met by substance use.   Target Date: 2/4/2020  Completion Date:     Problem: No relapse prevention plan   Goal: Develop relapse prevention plan   Must be reached to completed treatment? Yes  Methods/Strategies (must include amount and frequency):   1. Patient will be given a substance use behavior prevention plan to complete  Target Date: 2/4/2020  Completion Date:       Dimension 6: Recovery Environment, Risk level: 3  Problem Statement from Comprehensive Assessment:  Patient is currently unemployed, and so appears to lack structure for his daily life. He was able to identify hobbies and  activities he enjoys, but it did not appear that he was actively engaging in them prior to going to treatment. Patient reports that he is currently living at a sober house, and so has some support from his living environment. Patient reports that his family is supportive of his sobriety, and about half of his friends are as well. Some of the patient's friends could increase his risk of relapse as they drank together historically. Patient endorses pending legal consequences from a DWI that happened in August 2018. He denies any other current legal involvement.   Update: At this time the patient is working on identifying new housing options. This writer has been assisting him in this. Patient has been attending sober support groups and has found great benefit from that. Patient is managing all of his legal concerns at this time.     Problem: Lack of sober support   Goal: Attend sober support meetings at minimum of 2x per week.   Must be reached to complete treatment? yes  Methods/Strategies (must include amount and frequency):   1. Explore and identify sober support meetings to regularly attend.   2. Obtain a sponsor prior to phase II of treatment.   3. Patient to develop weekly schedule of activities, and include other activities that you think will benefit your recovery. Examples could be scheduled time with family, working out, or other activities that you find enjoyable.  Target Date: 2/4/2020  Completion Date:     Problem: On probation for a DWI charge   Goal: Comply with Probation  Must be reached to complete treatment? Yes   Methods/Strategies (must include amount and frequency):   1. Patient to attend all scheduled probation meetings.  2. Patient to comply with UA requests.  3. Patient to follow probation requirements and recommendations.   4. Patient to inform primary counselor immediate of any changes to probation.    Target Date: 2/4/2020  Completion Date:      Resources  Resources to which the patient is  being referred for problems when problems are to be addressed concurrently by another provider: Supervising MD, and all other providers as identified throughout treatment.       By signing this document, I am acknowledging that I was actively and directly involved in the development of my treatment plan.           Patient  Signature_________________________________________         Date__________________        Staff Signature  Mayte Olea [facility]    Date: 1/16/2020, 10:25 AM

## 2021-06-28 NOTE — PROGRESS NOTES
Progress Notes by Estefany Rothman CMA at 1/27/2020  3:15 PM     Author: Estefany Rothman CMA Service: -- Author Type: Certified Medical Assistant    Filed: 2/18/2020  3:32 PM Encounter Date: 1/27/2020 Status: Addendum    : Denisha Duval MD (Physician)    Related Notes: Original Note by Denisha Duval MD (Physician) filed at 1/27/2020  5:51 PM       Addiction Medicine  Outpatient Visit  With Dr. Gibbs    1/27/2020    Willie Dougherty, 1960.    Subjective   The patient is a 60 year old male who presents for alcohol use disorder and last saw Dr. Gibbs on 11/19/19.  Patient medication dose and name: wellbutrin 300mg, vistaril 50mg, lamictal 100mg  Patient developed a rash over her thigh areas the last week of December 2019 which coincided with increasing his Lamictal from 100 mg to 200 mg daily.  Lamictal was initially started at the end of October at 25 mg and was increased with 50 mg every 2 weeks up to 200 mg a therapeutic dose.  Patient has been on Lamictal for more than 10 years in the past for bipolar disorder prescribed by Dr. Mimi Eaton and tolerated the medication really well.  Despite the possibility of developing rash with Lamictal can occur at any stage of therapy.  The rash started on the right side of his inner thigh then spread to his left thigh and covered both thighs frontal area.  It was spreading quite quickly and was itchy and painful.  Initially he thought it might be a heat rash and was watching it but 2 weeks into it he went and saw a primary care provider and after ruling out other causes for the rash it was decided to eliminate Lamictal and see if the rash will improve.  He was also prescribed a local ointment with steroid and antifungal properties.  The rash improved and is completely gone currently.  Due to the description of the rash, the timing of its occurrence and improvement with discontinuing of Lamictal makes it highly is suspicious for  Lamictal induced.  It is disappointing for the patient because his mood was really improving with Lamictal and is he is here today to discuss other mood stabilizing options.  He reports increasing symptoms of irritability, anger outbursts, mood swings which can be related to being off mood stabilizing agent for 2 weeks.  We reviewed patient's history of using mood stabilizers and in the past he had tried Lamictal but could not tolerated due to needing frequent blood draws for management, Depakote, unclear of the dose but may have caused some drowsiness, Latuda which he could not tolerate due to the development of teeth clenching and muscle spasm.  He is willing to try again Depakote and will commit to follow-ups in the clinic and blood tests and would like to try a low dose with slow increases.  Otherwise from addiction standpoint patient celebrates 12 months of sobriety from alcohol today and feels extremely happy about it.  He is finally graduating from the [employer] with staff and medical approval.  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  PHQ-9 score past 4 today 8  ERROL-7 score past 10 today 10   Last UDS performed on 11/19/19 and was: neg    Last ETG performed on 11/19/19 and was: <100  First day of last menstrual period  n/a    UA collected today      Have you maintained sobriety from all substances?  If not what is your problem substance and frequency?  Yes, 12 months sober this month       What does patient currently fills his/her day with? (working, volunteering, TV)  group     Are you having any cravings?  From 0 to 5 (zero being none) grade your craving. What is the craving for?  0       Are you going to groups, if so where and how often? What group?  Will be graduating tomorrow from group      Side effects:  Dry mouth denies Constipation denies     Do you follow with a primary care doctor? When was your last visit?   Yes, dr. Valencia   "     Patient reports main support person is:  Peers in group      Is patient currently experiencing depression or thoughts of self-harm?   Stable, denies SIHI     Is patient having trouble with functioning because of worrying about things?  yes     Are any of the people in your life expressing concern for you?    no     Is there anything you would like to ask your doctor about?   Stopped lamictal due to side effects (rash), needs alternative has been more hyper and edgy, would like alternative to vistaril, does not help with anxiety.         Social History:  Patient lives with roomates, in an sober house.   Patient has no obstacles to maintain sobriety.    Allergies:  Patient has no known allergies.      Medications:  Current Outpatient Medications   Medication Sig Dispense Refill   ? buPROPion (WELLBUTRIN XL) 300 MG 24 hr tablet TAKE 1 TABLET(300 MG) BY MOUTH DAILY 90 tablet 1   ? hydrOXYzine pamoate (VISTARIL) 50 MG capsule Take 1 capsule (50 mg total) by mouth 3 (three) times a day as needed for anxiety. 90 capsule 0   ? lamoTRIgine (LAMICTAL) 100 MG tablet Take 1 tablet (100 mg total) by mouth daily. 60 tablet 2     No current facility-administered medications for this visit.          Review of Systems:  Patient  denies chest pain or shortness of breath. Patient is  not having fever or fatigue.       Physical Examination:  BP (!) 143/91   Pulse (!) 107   Ht 5' 6\" (1.676 m)   Wt 171 lb (77.6 kg)   BMI 27.60 kg/m    Physical Exam  There were no signs suggesting medication toxicity or impairment due to drug or alcohol use. Patient is not overtly manipulative. Gait intact.      Mental Status Examination    Grooming, Dress & Hygeine  Yes No  Comments    Normal x    Good hygiene and grooming.  No acute distress, awake alert oriented for time place person and situation    Attitude      cooperative    Cooperative x        Mood          Euthymic X     excellent mood, stable, no signs of carmen or hypomania    Affect   "        Full range x        Normal intensity x        Reactive x        Eye Contact      appropriate    Normal amount x        Speech      answers questions appropriately, no flight of ideas, or hyper verbal communication     Normal volume, rate and amount x        Verbal Fluency          Normal phonemic x        Normal semantic X        Fund of Knowledge          Average X        Thought Content      denies thoughts of hurting self or others    Normal X        Thought Processes      linear and logical    Normal X        Perceptions      denies auditory or visual hallucinations    Normal X        Insight & Judgment      limited    Recognition of illness X    gaining insight into his chronic brain disease    Readiness to change X    in action stage of recovery    Taking steps to manage illness X                 [place] Prescription Monitoring Program:  Reviewed, no worrisome activity was noted.    Summary of Diagnostic Studies:  Urine toxicology pending at the time this note was completed.      Assessment    1. Alcohol use disorder, severe, dependence (H)    2. Bipolar I disorder, most recent episode depressed (H)    3. Adjustment disorder with anxiety        Diagnoses/Plan:    1. Alcohol use disorder, severe, dependence (H)  -Patient is celebrating 1 year of sobriety today.  He was congratulated for his sustained and successful sobriety.  Anticipated discharge from [facility] is tomorrow January 28.  -Recommendations will be engagement with community resources such as AA/NA groups, sponsor contacts and continuing mental health services with psychotherapist and addiction medicine clinic at [facility]  - Drug Abuse 1+, Urine  - Ethyl Glucuronide and Ethyl Sulfate, Urine, Quantitative ([facility] ETG/S)    2. Bipolar I disorder, most recent episode depressed (H)  -Due to side effect of Lamictal rash, Lamictal will be discontinued.   - Discussed different therapeutic options for management of  bipolar disorder.  Patient had trialed multiple mood stabilizers and had experienced side effects from any of them.  -Lithium, unable to continue due to noncompliance with follow-ups for blood levels  -Depakote, had experience drowsiness from higher doses   -Latuda, EPS reaction  -Gabapentin, causes drowsiness  Willing to retryl-Depakote at a low dose.  Discussed with patient prior psychiatrist Dr. Mimi Eaton who will send me a list of mood stabilizers the patient has trialed in the past.  She agreed with trial of Depakote and possibly considering Keppra in the future if needed and Depakote does not work out  -Counseled patient about side effects from Depakote: Liver problems can occur with this drug and sometimes they can be deadly.  Most of the time liver problems happen within the first 6 months after starting the medication.  Patient was advised to call the clinic or go to an emergency room right away if he has signs of liver problems, dark urine, upset stomach, stomach pain, light-colored stools, throwing up, yellowing of the skin or eyes.  -Liver enzymes and renal profile will be requested on the next clinic appointment in 4 weeks.  -Reviewed prior levels of liver enzymes and kidney function levels done in 2018 and were within normal limits-  - divalproex (DEPAKOTE ER) 500 MG 24 hour tablet; Take 1 tablet (500 mg total) by mouth at bedtime.  Dispense: 30 tablet; Refill: 2    -Titration of Depakote typically occurs every 3 to 5 days up to a therapeutic dose of 1.5 to 2 g daily  -Patient agreed to call the clinic and let us know how the medications is working within 3 to 5 days.  3. Adjustment disorder with anxiety  DC Vistaril due to not sufficient improvement of anxiety  - propranolol (INDERAL) 10 MG tablet; Take 1 tablet (10 mg total) by mouth 3 (three) times a day as needed.  Dispense: 90 tablet; Refill: 2     At least 25 min, was spent in the care of this patient and >50% of this time was spent in  face-to-face counseling and coordination of care as noted above, discussion with patient prior psychiatrist, counseling about indications and benefits of different mood stabilizers.

## 2021-06-28 NOTE — PROGRESS NOTES
Progress Notes by Sonja Linder LPN at 9/23/2019 11:45 AM     Author: Sonja Linder LPN Service: -- Author Type: Licensed Nurse    Filed: 11/19/2019  2:15 PM Encounter Date: 9/23/2019 Status: Addendum    : Denisha Duval MD (Physician)    Related Notes: Original Note by Denisha Duval MD (Physician) filed at 9/23/2019 12:35 PM       Addiction Medicine  Outpatient Visit  With Dr. Gibbs    9/23/2019    Willie Dougherty, 1960.    Subjective   The patient is a 59 year old male who presents for alcohol use disorder and last saw Dr. Gibbs on 8/21/19.  Patient participates in intensive outpatient programming at [facility].  He reports being sober since January 2019.  He resides in a sober house, [facility] in [place].  He is compliant with treatment and has good attendance however feels that this treatment is not necessary but he is trying to fulfill a court recommendation  Patient is here to follow-up on his psychotropic medications which include  Patient medication dose and name: wellbutrin 300 mg XL daily   Hasn't been using Gabapentin ( last filled 7/24/19 )  Vistaril PRN only  20 years ago after a suicide attempt a psychiatrist in [facility] had diagnosed him with bipolar disorder and treated him with Wellbutrin and Lamictal for a long time.  Patient reports that he was stable on these 2 medications for a long time but discontinued Lamictal on his own 2 years ago which resulted in increasing  carmen and depression symptoms and alcohol relapse.  Patient reports that he relapses into alcohol during a manic episode.  Currently he is on Wellbutrin  mg daily and reports good effect from it without any symptoms of carmen.  His symptoms are mostly consistent with moderate depression and anxiety.  Initially my diagnosis was substance-induced mood disorder and adjustment disorder with anxious mood.  A formal diagnostic assessment  was obtained and completed by [facility]  on August 30, 2019.    I have reviewed the diagnostic assessment in detail.   The clinical impressions supported a diagnosis of bipolar type I disorder.  Patient has provided a history of alternating episodes of carmen with depression and asymptomatic periods.  Manic and depressive episodes typically last several months or more and alcohol relapse is typically related to manic episodes.  Patient has been receiving SSDI benefits for bipolar disorder since 2011, as his symptoms were severe enough that he could not hold a job despite his substantial education (B.A. and M.F.A.).   At this time, patient would like to continue with the same meds  PHQ-9 score past 4 today 3  ERROL-7 score past 4 today 2   AMRITA: none        Last UDS performed on 8/21/19 and was: neg  Last BUP performed on N/A   Last ETG performed on 8/21/19 and was: < 100     UA collected today      Have you maintained sobriety from all substances?  If not what is your problem substance and frequency?  Reports being sober since January      What does patient currently fills his/her day with? (working, volunteering, TV)  Doing senior recovery groups at      Are you having any cravings?  From 0 to 5 (zero being none) grade your craving. What is the craving for?  no       Are you going to groups, if so where and how often? What group?  MIC[facility] group and groups at sober [place]     Side effects:  Dry mouth a little bit Constipation no     Do you follow with a primary care doctor? When was your last visit?   Trying to switch his PMD       Patient reports main support person is:  family     Is patient currently experiencing depression or thoughts of self-harm?   A little bit depression, no SI     Is patient having trouble with functioning because of worrying about things?  no     Are any of the people in your life expressing concern for you?    no     Is there anything you would like to ask your doctor about?    Routine check up         Social History:  Patient lives with claudia, in a sober house, attending MICD groups     Patient has  no obstacles to maintain sobriety.    Allergies:  Patient has no known allergies.      Medications:  Current Outpatient Medications   Medication Sig Dispense Refill   ? buPROPion (WELLBUTRIN XL) 300 MG 24 hr tablet TAKE 1 TABLET(300 MG) BY MOUTH DAILY 90 tablet 1   ? gabapentin (NEURONTIN) 100 MG capsule Take 200 mg by mouth at bedtime. 60 capsule 2   ? hydrOXYzine pamoate (VISTARIL) 50 MG capsule Take 1 capsule (50 mg total) by mouth 3 (three) times a day as needed for anxiety. 90 capsule 0   ? omeprazole (PRILOSEC) 20 MG capsule TAKE 1 CAPSULE(20 MG) BY MOUTH DAILY 90 capsule 1     No current facility-administered medications for this visit.          Review of Systems:  Patient  denies chest pain or shortness of breath. Patient is  not having fever or fatigue.       Physical Examination:  There were no vitals taken for this visit.  Physical Exam  There were no signs suggesting medication toxicity or impairment due to drug or alcohol use. Patient is not overtly manipulative. Gait intact.      Mental Status Examination    Grooming, Dress & Hygeine  Yes No  Comments    Normal x    Good hygiene and grooming.  No acute distress, awake alert oriented for time place person and situation    Attitude      cooperative    Cooperative x        Mood      anxious and flat    Euthymic    X      Affect          Full range x        Normal intensity x        Reactive x        Eye Contact      appropriate    Normal amount x        Speech      answers questions appropriately, no flight of ideas, or hyper verbal communication     Normal volume, rate and amount x        Verbal Fluency          Normal phonemic x        Normal semantic X        Fund of Knowledge      limited    Average X        Thought Content      denies thoughts of hurting self or others    Normal X        Thought Processes      linear and  logical    Normal X        Perceptions      denies auditory or visual hallucinations    Normal X        Insight & Judgment      limited    Recognition of illness X    gaining insight into his chronic brain disease    Readiness to change X    in action stage of recovery    Taking steps to manage illness X                 [place] Prescription Monitoring Program:  Reviewed, no worrisome activity was noted.        Summary of Diagnostic Studies:  Urine toxicology pending at the time this note was completed.      Assessment    1. Alcohol use disorder, severe, dependence (H)    2. Bipolar I disorder, most recent episode depressed (H)    3. Alcohol-induced insomnia (H)        Diagnoses/Plan:    1. Alcohol use disorder, severe, dependence (H)  -Doing well from addiction standpoint.  Denies cravings and sober since January 2019.  Continue and complete rehabilitation therapy  - Ethyl Glucuronide and Ethyl Sulfate, Urine, Quantitative ([facility] ETG/S)  - Drug Abuse 1+, Urine    2. Bipolar I disorder, most recent episode depressed (H)  -Patient's history and symptoms outlined outlined in his diagnostic assessment by psychology meet criteria for bipolar type I disorder.  -Wellbutrin is typically not indicated in bipolar disorder due to its tendency to provoke manic episodes however due to the long-term, intake of this medication by the patient and doing well in conjunction with Wellbutrin I would recommend to continue the medication and add  Lamictal for mood stabilization.     The patient and I participated in extensive specific discussion of the potential for an inflammatory reaction to lamotrigine that can become severe and even life threatening.   This is particularly true if the medication is continued in spite of warning signs.  The reaction is called Kirill's Bethel's Syndrome or Toxic Epidermal Necrolysis.  Warning signs would include rash, particularly upper chest, neck and mouth, fever or swollen lymph nodes without rash  or apparent cause.  Shared that the risk is similar anytime the medication is stopped for more than a few days to a week and started again. Slow introduction of the medication and attention to early warning signs which  prompt discontinuation of the medicine reduce the risk.  Any questions from the patient were answered and they were able to express understanding and agreed with this medication trial.  Patient will take 1 tablet by mouth daily for 2 weeks then increase to 2 tablets daily.  - lamoTRIgine (LAMICTAL) 25 MG tablet; Take 1 tablet (25 mg total) by mouth daily.  Dispense: 60 tablet; Refill: 0    3. Alcohol-induced insomnia (H)  -Continue gabapentin as prescribed    4.  Follow-up in 1 month for Lamictal induction and monitoring symptoms of bipolar disorder     At least 25 min, was spent in the care of this patient and >50% of this time was spent in face-to-face counseling and coordination of care

## 2021-07-03 NOTE — ADDENDUM NOTE
Addendum Note by Giselle Jain MD at 11/8/2019 12:05 PM     Author: Giselle Jain MD Service: Family Medicine Author Type: Physician    Filed: 11/8/2019 12:05 PM Encounter Date: 9/23/2019 Status: Signed    : Giselle Jain MD (Physician)    Addended by: GISELLE JAIN on: 11/8/2019 12:05 PM        Modules accepted: Orders

## 2021-07-04 NOTE — PROGRESS NOTES
Rule 31 by Cecille Candelaria LADC at 2019 10:30 AM     Author: Cecille Candelaria LADC Service: -- Author Type: Licensed Alcohol and Drug Counselor    Filed: 2019  8:14 AM Encounter Date: 2019 Status: Attested    : Cecille Candelaria LADC (Licensed Alcohol and Drug Counselor) Cosigner: Denisha Duval MD at 2019 10:13 AM    Attestation signed by Denisha Duval MD at 2019 10:13 AM     Note reviewed and agree with above.                  [facility] Assessment   Date: 2019        : ROSARIO Gilmore    Name: Willie Dougherty  Address: [address] [place] [address]  Phone: [phone] (home)   Referral Source: [facility]  : 1960  Age: 59 y.o.  Race/Ethnicity: White or   Marital Status: Single  Employment: [employer]                                                                                                                       Level of Education: Master's Degree   Socio-economic (yearly Income) Status: $980/month  Sexual Orientation: Homosexual   Last 4 digits of Social Security: 2868    Is assistance required in the ability to read and understand written material?   no    Reason for seeking services:    Patient reports that he is here because;  He was told that outpatient was required follow-up from his treatment program.   Patient states that he was at [facility] for treatment from 19-6/10/19.     Dimension I Acute intoxication/Withdrawal Potential:    Symptomology (past 12 months, check all that apply)  binges, preoccupation, medicinal use, using alone and family history of addiction    Chemical use most recent 12 months outside a facility and other significant use history (client self-report)  Primary Drug Used  Age of First Use  Most Recent Pattern of Use and Duration    Date of last use  Time if substance use in the last 30 days Withdrawal Potential? Requiring special care  Method of use   (oral,  smoked, snort, IV, etc)    Alcohol  22 3-4 days per day. 4-6 drinks per time. Wine or mixed drinks 2019 NA None Oral   Marijuana/Hashish   Denies       Cocaine/Crack   Denies       Meth/Amphetamines   Denies       Heroin   Denies       Other Opiates/Synthetics   Denies       Inhalants   Denies       Benzodiazepines   Denies       Hallucinogens   Denies       Barbiturates/Sedatives/Hypnotics   Denies       Over-the-Counter Drugs   Denies       Other   Denies       Nicotine   Denies         Do you use greater amounts of alcohol/other drugs to feel intoxicated or achieve the desired effect? no.  Or use the same amount and get less of an effect? Yes  Example: Patient does endorse getting less of an effect from the amount that he was drinking.     Have you ever been to detox? no    When was the first time? NA    How many times since then? NA    Date of most recent detox: NA      Observed or reported (withdrawal symptoms, check all that apply)-In the last 30 days  none reported or displayed    Observed or reported (withdrawal symptoms, check all that apply)-In the last 12 months  none reported or displayed    's Visual Observations and Symptoms: Patient is oriented x4, and shows no physical signs or symptoms of intoxication or withdrawal.     Is the client is in severe withdrawal and likely to be a danger to self or others: No    Based on the above information, is withdrawal likely to require attention as part of treatment participation?  no    Dimension I Risk Ratin  Reason Risk Rating Assigned: Patient reports a history of regular alcohol use. He states that he was recently discharged from residential treatment at [facility]. Patient denies any alcohol use since being out of treatment. He reports his last use of alcohol was in 2019. Patient denies any current or past withdrawal symptoms. He shows no physical signs or symptoms of intoxication or withdrawal. Patient is oriented x4.          Dimension II Biomedical Conditions:    Any known health conditions (Include any infectious diseases, allergies, or chronic or acute pain, history of chronic conditions): No    List Health Concerns/Conditions Reported: Denies    Does the client have severe medical problems that require immediate attention: No    Ever previously treated/diagnosed with any eating disorder?  no     Does patient indicate awareness of any association between substance use and listed health concerns/conditions? No    Physical/Health Conditions which are associated with substance use: Denies    Do you have a health care provider? When was your most recent appointment? What concerns were identified?    Patient does not currently have a PCP.   He states that he goes to the Urgent Care if he needs medical attention.   Patient reports that he was last seen by a doctor while he was in treatment, and denies any concerns expressed at that time.     Has a health care provider/healer ever recommended that you reduce or quit alcohol/drug use?  No (DSM)-  Patient reports that he has never told a healthcare provider about the full extent of his alcohol use.     Do you have any specific physical needs/accommodations? no    Patient Self-Reported Medications:  Medication Dosage Frequency Last Taken   Omeprozol 20mg 1x per day 20     Do you follow current medical recommendations/take medications as prescribed?   yes    Are you pregnant: NA OB care received:NA CPS call needed: NA    Dimension II Risk Ratin  Reason Risk Rating Assigned: Patient denies any current health issues or concerns. He does not currently have a PCP, but appears able to get his medical needs met and addressed as necessary. Patient denies any immediate medical needs or concerns. Patient appears to be in adequate physical health and functioning at this time.         Dimension III Emotional/Behavioral/Cognitive:    Oriented to person, place, time, situation?  Yes     When  "was the last time that you had significant problems?  A. With feeling very trapped, lonely, sad, blue, depressed or hopeless about the future?   2-12 months ago- \"A couple of months ago.\" He endorses general feelings of depression.       B. With sleep trouble, such as bad dreams, sleeping restlessly, or falling asleep during the day?   Never- Denies      C. With feeling very anxious, nervous, tense, scared, panicked, or like something bad was going to happen?   2-12 months ago- \"A couple of months ago.\" Patient endorses feelings of anxiety due to legal issues that he was having.      D. With becoming very distressed and upset when something reminded you of the past?   2-12 months ago- \"A couple of months ago.\" Patient states that this was due to his legal problems and a DWI he got.       E. With thinking about ending your life or committing suicide?    1+ years ago- Denies any current thoughts or plans. Patient reports a suicide attempt by car about 20 years ago.       3.  When was the last time that you did the following things two or more times?  A. Lied or conned to get things you wanted or to avoid having to do something?   1+ years ago- \"I can't remember.\"     B. Had a hard time paying attention at school, work, or home?   Never- Denies       C. Had a hard time listening to instructions at school, work, or home?   Never- Denies       D. Were a bully or threatened other people?   Never- Denies       E. Started physical fights with other people?   Never- Denies         Note: These questions are from the Global Appraisal of Individual Needs--Short Screener. Any item marked past month or 2 to 12 months ago will be scored with a severity rating of at least 2.  For each item that has occurred in the past month or past year ask follow up questions to determine how often the person has felt this way or has the behavior occurred? How recently? How has it affected their daily living? And, whether they were using or in " withdrawal at the time?    See Above.     Have you ever been diagnosed with a mental health problem?  yes- Depression and Bipolar disorder    Are you receiving care for any mental health issues? yes  If yes, what is the focus of that care or treatment?  Are you satisfied with the service?  Most recent appointment?  How has it been helpful?    Patient reports that he is in the process of setting up services.   He states that he got a referral from [facility], and will be getting psychiatry services for sure.     Does your MH provider know about your use?  NA   What does he or she have to say about it? (DSM)  NA    Past Hospitalization for MH or psychiatric problems: No    How many Hospitalizations: 0   Last Hospitalization; date and location: NA      Past or Current Issues with Gambling (Explain): no    Prior Treatment for Gambling: No     Current Psychotropic Medications:  See Dimension 2    Taking medications as prescribed:  Yes   Medications Helpful: Yes    Current Suicidal Ideation: No  If yes, any plan? NA What is plan?:   Denies    Previous Suicide Attempts?  Yes   Explain: 20 years ago; attempted to use a car.      Current Homicidal Ideation: No  If yes, any plan? NA  What is plan?: Denies    Previous Homicide Attempts? No Explain: Denies    Suicidal/Homicidal Ideation in last 30 days? No  Explain: Denies     Does the client has severe emotional or behavioral symptoms that place the client or others at risk of harm: No    [place]: no  10B.  Exposure to Combat?  no    11. Do you have problems with any of the following things in your daily life?  None      Note: If the person has any of the above problems, how do they deal with them, have they developed coping mechanisms?  Have they received treatment?  Follow up with items 12, 13, and 14. If none of the issues in item 11 are a problem for the person, skip to item 15.    Patient denies any issues in the areas in question.     12. Have you been diagnosed with  "traumatic brain injury or Alzheimer's?  no- Denies    13.  If the answer to #12 is no, ask the following questions:    Have you ever hit your head or been hit on the head? no- Denies    Were you ever seen in the Emergency Room, hospital, or by a doctor because of an injury to your head? no- Denies    Have you had any significant illness that affected your brain (brain tumor, meningitis, West Nile Virus, stroke or seizure, heart attack, near drowning or near suffocation)?  no- Denies    14.  If the answer to # 12 is yes, ask if any of the problems identified in #11 occurred since the head injury or loss of oxygen no    Hazardous behavior engaged in which placed self or others in danger (i.e., operating a motor vehicle, unsafe sex, sharing needles, etc.)?   Driving    Family history of substance and/or mental health diagnosis/issues?  Yes  Explain:   Substance Use;  Aunt- alcohol  Maternal grandfather- alcohol    Mental Health;  Brother- bipolar disorder     History of abuse (Physical, Emotional, Sexual)? No  Explain: Denies       Dimension III Risk Ratin  Reason Risk Rating Assigned: Patient reports mental health diagnoses of depression and bipolar disorder. Patient does not currently have any mental health services as he was recently discharged from residential treatment. He does endorse that he will be seeking psychiatry services. Patient endorses medication to help manage his mental health symptoms. Patient denies any suicidal or homicidal thoughts or plans. Patient is oriented x4.         Dimension IV Readiness to Change:      Tell me how things are going. Ask enough questions to determine whether the person has use related problems or assets that can be built upon in the following areas: Family/friends/relationships; Legal; Financial; Emotional; Educational; Recreational/ leisure; Vocational/employment; Living arrangements (DSM)     Overall- \"Pretty good considering. He reports that the \"whole treatment " "thing\" has been more than he expected or thought he needed.  Relationships- \"Good.\" No issues or concerns.   Legal- Patient endorses pending legal issues for a DWI that happened in 2018 in [place].  Financial- \"Okay.\"   Emotional- \"Alright.\" No issues or major concerns.   Education- None currently.   Recreation/Leisure- Patient reports that he enjoys skiing, reading, going to movies, and boating in the summer  Employment- Patient is not currently working. He last worked about 1  Year ago.   Living- Patient currently lives in a sober house; [facility]. He states that \"it's okay.\" He plans to stay there for couple of months.     What concerns other people about your alcohol or drug use/Has anyone told you that you use too much? What did they say? (DSM)    Patient reports that his parents have expressed concern about his frequency of use.     His thoughts about their concerns; \"I agreed enough to look into this.\"     Mandated, or coerced into assessment or treatment:  Yes     Does client feel there is a problem:   Yes    Verbalization of need/desire to change:   Yes     Willing to follow treatment recommendations: Yes     Impression of : (Check all that apply):    cooperative and ambivalent about change    Are there any spiritual, cultural, or other special needs to be addressed for client to be successful in treatment? no      Dimension IV Risk Ratin  Reason Risk Rating Assigned: Patient reports that he does feel that he is required to be here for treatment, and he does not feel that he \"needed all of this.\" Patient does verbalize a desire to change his substance use at this time. Patient does appear ambivalent about his need for continued treatment at this time. He does verbalize willingness to follow treatment recommendations at this time. Patient was calm, cooperative, and appropriately behaved throughout this appointment.         Dimension V Relapse/Continued Use/Continued Problem " "Potential     Client age at First Treatment: 59    Lifetime # of CD Treatments:  1  List program, dates, and status of completion (within last five years): [facility]- successful    Longest Period of Abstinence: 2-3 years (about 3 years ago)    How did you accomplish this? Patient reports that he just wasn't interested in drinking     Circumstances which led to Relapse: \"Bipolar and the people I was hanging out with.\"    Have you experienced cravings? If yes, ask follow up questions to determine if the person recognizes triggers and if the person has had any success in dealing with them.     Patient denies experiencing any cravings or urges to use.   He identified certain friends, and certain locations/places as his triggers.     Risk Taking/Problem Behaviors Related to Use and/or Under the Influence: Driving      Dimension V Risk Rating: 3  Reason Risk Rating Assigned: Patient reports that he just completed his first treatment. He therefore appears to have some knowledge of addiction, but could benefit from additional education. Patient appears to lack some insight into his use and the impact it had on his life as he appears to minimize his need for treatment and change despite the negative consequences he has experienced. Patient appears to lack the necessary coping skills at this time to help him prevent relapse as he reports just having gotten out of residential treatment, and minimal periods of sobriety recently. Patient therefore appears to currently be at an increased risk of relapse.         Dimension VI Recovery Environment   Family support:  Yes  Peer Sober Support:  Yes; half of them    Current living circumstances:  Patient is currently living at [facility]; sober house.     Environment supportive of recovery:  Yes    Describe a typical day; evening for you. Work, school, social, leisure, volunteer, spiritual practices. Include time spent obtaining, using, recovering from drugs " or alcohol. 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 Drinking with friends; either at bars or friend's houses.   Now; wake up, explore the neighborhood, reading, walking, watch movies.     2B. How often do you spend more time than you planned using or use more than you planned? (DSM)     Patient endorses spending more time than planned, and drinking more than planned about 1-2x per month.     Specific activities participating in which do not involve substance use:  Skiing  Going to movies  Reading  boating    Specific activities participating in which do involve substance use:  Going to bars  Wine tasting  Playing pool    People, things that threaten recovery: no    Expected family involvement during treatment services:  None    Current Legal Involvement:  Pending charges for DWI    Legal Consequences related to use: DWI (3)    Occupational/Academic consequences related to use: None    Current ability to function in a work and/or education setting: Yes; although not currently looking for work    Current support network for recovery (including community-based recovery support): Patient reports that he has been to a few through the [email or web address]. A few times per week.     What obstacles exist to participating in treatment? (Time off work, childcare, funding, transportation, pending long term time, living situation)    None    Do you belong to a [place]: No Which [place]?   Reside on reservation: No     Dimension VI Risk Rating: 3 Reason Risk Rating Assigned: Patient is currently unemployed, and so appears to lack structure for his daily life. He was able to identify hobbies and activities he enjoys, but it did not appear that he was actively engaging in them prior to going to treatment. Patient reports that he is currently living at a sober house, and so has some support from his living environment.  Patient reports that his family is supportive of his sobriety, and about half of his friends are as well. Some of the patient's friends could increase his risk of relapse as they drank together historically. Patient endorses pending legal consequences from a DWI that happened in August 2018. He denies any other current legal involvement.    Client Choice/Exceptions     Would you like services specific to language, age, gender, culture, Episcopalian preference, race, ethnicity, sexual orientation or disability?  no    If yes, specify:      What particular treatment choices and options would you like to have?  Outpatient, Afternoons    Do you have a preference for a particular treatment program?  [facility]            DSM-V Criteria for Substance Abuse  Instructions:  Determine whether the client currently meets the criteria for a Substance Use Disorder using the diagnostic criteria in the  DSM-V, pp. 481-589. Current means during the most recent 12 months outside a facility that controls access to substances.    Category of substance Severity ICD-10 Code/DSM V Code  Alcohol Use Disorder Mild  Moderate  Severe (F10.10) (305.00)  (F10.20) (303.90)  (F10.20) (303.90)   Cannabis Use Disorder Mild  Moderate  Severe (F12.10) (305.20)  (F12.20) (304.30)  (F12.20) (304.30)   Hallucinogen Use Disorder Mild  Moderate  Severe (F16.10) (305.30)  (F16.20) (304.50)  (F16.20) (304.50)   Inhalant Use Disorder Mild  Moderate  Severe (F18.10) (305.90)  (F18.20) (304.60)  (F18.20) (304.60)   Opioid Use Disorder Mild  Moderate  Severe (F11.10) (305.50)  (F11.20) (304.00)  (F11.20) (304.00)   Sedative, Hypnotic, or Anxiolytic Use Disorder Mild  Moderate  Severe (F13.10) (305.40)  (F13.20) (304.10)  (F13.20) (304.10)   Stimulant Related Disorders Mild              Moderate              Severe   (F15.10) (305.70) Amphetamine type substance  (F14.10) (305.60) Cocaine  (F15.10) (305.70) Other or unspecified stimulant    (F15.20) (304.40)  Amphetamine type substance  (F14.20) (304.20) Cocaine  (F15.20) (304.40) Other or unspecified stimulant    (F15.20) (304.40) Amphetamine type substance  (F14.20) (304.20) Cocaine  (F15.20) (304.40) Other or unspecified stimulant   DisorderTobacco use Disorder Mild  Moderate  Severe (Z72.0) (305.1)  (F17.200) (305.1)  (F17.200) (305.1)   Other (or unknown) Substance Use Disorder Mild  Moderate  Severe (F19.10) (305.90)  (F19.20) (304.90)  (F19.20) (304.90)     Suggested Level of Care Necessary for Recovery  []  Inpatient  []  Extended Care []  Residential [x]  Outpatient  []  None    Diagnostic Impression: Alcohol Use Disorder, Severe (F10.20)    Collateral Contact Summary   Number of contacts made:  2  Contact with referring person:  yes     If court related records were reviewed, summarize here:  NA     [x]   Information from collateral contacts supported/largely agreed with information from the client and associated risk ratings.   []   Information from collateral contacts was significantly different from information from the client and lead to different risk ratings.      Summarize new information here:      Rule 25 Assessment Summary and Plan   's Recommendation    Based on the information gathered in this assessment and from collateral information, the client meets DSM IV criteria for Alcohol Use Disorder, Severe (F10.20).  Patient is recommended to attend Intensive Outpatient treatment, and follow all recommendations.   Patient to comply with all legal requirements and recommendations.   Patient to remain free from use of all non-prescribed mood altering substances.   This assessment can be updated with additional information within a 6 month period.      Collateral Contacts     Please duplicate this page for each contact.  If this includes information which is sensitive and not public, separate this page from the rest of the assessment before sharing.  Retain the page in the assessment file.  "  Name    [facility] Relationship    Residential Treatment/Referral site Phone Number     Releases    no       Information Provided:      Patient discharged from [facility] on 6/10/19.  Verified alcohol use disorder, severe.         Collateral Contacts     Name    Isela Dougherty   Relationship    Brother Phone Number    [phone] Releases    yes       Information Provided:      Isela reported the following on 6/19/19 @ 3122 via voicemail;    Patient has bipolar disorder.   When he is manic he drinks to excess.   When he is normal or depressed he drinks some, but not as much.   Patient goes through long stretches of carmen and depression.   He has been more \"normal\" currently.   Sometimes his mental states switch frequently, and sometimes they are prolonged.   Patient's \"normal\" periods have been fewer.       Attempted to contact Isela 6/19/19 @ 5091. No answer, left message to return call with any information.               A problematic pattern of alcohol/drug use leading to clinically significant impairment or distress, as manifested by at least two of the following, occurring within a 12-month period:      Specify if: In early remission:  After full criteria for alcohol/drug use disorder were previously met, none of the criteria for alcohol/drug use disorder have been met for at least 3 months but for less than 12 months (with the exception that Criterion A4, Craving or a strong desire or urge to use alcohol/drug may be met).     In sustained remission:   After full criteria for alcohol use disorder were previously met, none of the criteria for alcohol/drug use disorder have been met at any time during a period of 12 months or longer (with the exception that Criterion A4, Craving or strong desire or urge to use alcohol/drug may be met).   Specify if:   This additional specifier is used if the individual is in an environment where access to alcohol is restricted.    Mild: Presence of 2-3 " symptoms  Moderate: Presence of 4-5 symptoms  Severe: Presence of 6 or more symptoms-ALCOHOL    This document is being reviewed and co-signed by a medical doctor. A follow up appointment will be scheduled if necessary to determine medical necessity for treatment services.     Staff Name and Title: ROSARIO Gilmore   Date:  6/19/2019  Time:  10:22 AM

## 2021-07-04 NOTE — PROGRESS NOTES
Rule 31 by Cecille Candelaria LADC at 2019 10:30 AM     Author: Cecille Candelaria LADC Service: -- Author Type: Licensed Alcohol and Drug Counselor    Filed: 2019 11:23 AM Encounter Date: 2019 Status: Signed    : Cecille Candelaria LADC (Licensed Alcohol and Drug Counselor)         [facility] Assessment   Date: 2019        : ROSARIO Gilmore    Name: Willie Dougherty  Address: [address] [place] [address]  Phone: [phone] (home)   Referral Source: [facility]  : 1960  Age: 59 y.o.  Race/Ethnicity: White or   Marital Status: Single  Employment: Disabled                                                                                                                       Level of Education: Master's Degree   Socio-economic (yearly Income) Status: $980/month  Sexual Orientation: Homosexual   Last 4 digits of Social Security: 2868    Is assistance required in the ability to read and understand written material?   no    Reason for seeking services:    Patient reports that he is here because;  He was told that outpatient was required follow-up from his treatment program.   Patient states that he was at [facility] for treatment from 19-6/10/19.      Dimension I Acute intoxication/Withdrawal Potential:    Symptomology (past 12 months, check all that apply)  binges, preoccupation, medicinal use, using alone and family history of addiction    Observed or reported (withdrawal symptoms, check all that apply)  none reported or displayed    Chemical use most recent 12 months outside a facility and other significant use history (client self-report)  Primary Drug Used  Age of First Use  Most Recent Pattern of Use and Duration    Date of last use  Time if substance use in the last 30 days Withdrawal Potential? Requiring special care  Method of use   (oral, smoked, snort, IV, etc)    Alcohol  22 3-4 days per week. 4-6 drinks per day; wine or mixed drinks January  2019 NA None Oral   Marijuana/Hashish   Denies       Cocaine/Crack   Denies       Meth/Amphetamines   Denies       Heroin   Denies       Other Opiates/Synthetics   Denies       Inhalants   Denies       Benzodiazepines   Denies       Hallucinogens   Denies       Barbiturates/Sedatives/Hypnotics   Denies       Over-the-Counter Drugs   Denies       Other   Denies       Nicotine   Denies           Dimension I Risk Ratin  Reason Risk Rating Assigned: Patient reports a history of regular alcohol use. He states that he was recently discharged from residential treatment at [facility]. Patient denies any alcohol use since being out of treatment. He reports his last use of alcohol was in 2019. Patient denies any current or past withdrawal symptoms. He shows no physical signs or symptoms of intoxication or withdrawal. Patient is oriented x4.         Dimension II Biomedical Conditions:    Any known health conditions: No    Ever previously treated/diagnosed with any eating disorder?  no     List Health Concerns/Conditions Reported: Denies    Does patient indicate awareness of any association between substance use and listed health concerns/conditions? No    Physical/Health Conditions which are associated with substance use: NA    Are Health Concerns/Conditions being treated? No  By Whom?   Patient does not currently have a PCP.   He states that he goes to the Urgent Care if he needs medical attention.   Patient was last seen by a doctor at his [facility] MD appointment to start treatment.        Patient Self-Reported Medications:  Medication Dosage Frequency   Omeprozol 20mg 1x per day   Wellbutrin 150mg 1x per day     Are you pregnant: NA OB care received:NA CPS call needed: NA    Dimension II Risk Ratin  Reason Risk Rating Assigned: Patient denies any current health issues or concerns. He does not currently have a PCP, but appears able to get his medical needs met and addressed as necessary. Patient denies any  immediate medical needs or concerns. Patient appears to be in adequate physical health and functioning at this time.         Dimension III Emotional/Behavioral/Cognitive:    Oriented to person, place, time, situation?  Yes     Current Mental Health Services: no; patient reports that he will be working to get psychiatry services for sure.     Past Hospitalization for MH or psychiatric problems: No    How many Hospitalizations: 0   Last Hospitalization; date and location: NA      Past or Current Issues with Gambling (Explain): no    Prior Treatment for Gambling: No     MH Diagnoses:    Depression  Bipolar Disorder    Psychiatrist: None     Clinic: NA      Current Psychotropic Medications:  See Dimension 2    Taking medications as prescribed:  Yes   Medications Helpful: Yes    Current Suicidal Ideation: No  If yes, any plan? NA What is plan?:   Denies    Previous Suicide Attempts?  Yes   Explain: 20 years ago; attempted to use a car.      Current Homicidal Ideation: No  If yes, any plan? NA  What is plan?: Denies    Previous Homicide Attempts? No Explain: Denies    Suicidal/Homicidal Ideation in last 30 days? No  Explain: Denies     Hazardous behavior engaged in which placed self or others in danger (i.e., operating a motor vehicle, unsafe sex, sharing needles, etc.)?   Driving    Family history of substance and/or mental health diagnosis/issues?  Yes  Explain:   Substance Use;  Aunt- alcohol  Maternal grandfather- alcohol     Mental Health;  Brother- bipolar disorder      History of abuse (Physical, Emotional, Sexual)? No  Explain: Denies       Dimension III Risk Ratin  Reason Risk Rating Assigned: Patient reports mental health diagnoses of depression and bipolar disorder. Patient does not currently have any mental health services as he was recently discharged from residential treatment. He does endorse that he will be seeking psychiatry services. Patient endorses medication to help manage his mental health  "symptoms. Patient denies any suicidal or homicidal thoughts or plans. Patient is oriented x4.         Dimension IV Readiness to Change:    Mandated, or coerced into assessment or treatment:  Yes    Does client feel there is a problem:   Yes    Verbalization of need/desire to change:   Yes     Willing to follow treatment recommendations: Yes     Impression of : (Check all that apply):    cooperative and ambivalent about change    Are there any spiritual, cultural, or other special needs to be addressed for client to be successful in treatment? no        Dimension IV Risk Ratin  Reason Risk Rating Assigned: Patient reports that he does feel that he is required to be here for treatment, and he would have liked a less intensive program. Patient does verbalize a desire to change his substance use at this time. Patient does appear ambivalent about his need for continued treatment at this time. He does verbalize willingness to follow treatment recommendations at this time. Patient was calm, cooperative, and appropriately behaved throughout this appointment.         Dimension V Relapse/Continued Use/Continued Problem Potential     Client age at First Treatment: 59    Lifetime # of CD Treatments:  1  List program, dates, and status of completion (within last five years): [facility]- successful    Longest Period of Abstinence: 2-3 years (about 3 years ago).     How did you accomplish this? Patient reports that he just wasn't interested in drinking      Circumstances which led to Relapse: \"Bipolar and the people I was hanging out with.\"    Risk Taking/Problem Behaviors Related to Use and/or Under the Influence: Driving      Dimension V Risk Rating: 3  Reason Risk Rating Assigned: Patient reports that he just completed his first treatment. He therefore appears to have some knowledge of addiction, but could benefit from additional education. Patient appears to lack some insight into his use and " the impact it had on his life as he appears to minimize his need for treatment and change despite the negative consequences he has experienced. Patient appears to lack the necessary coping skills at this time to help him prevent relapse as he reports just having gotten out of residential treatment, and minimal periods of sobriety recently. Patient therefore appears to currently be at an increased risk of relapse.         Dimension VI Recovery Environment   Family support:  Yes  Peer Sober Support:  Yes; half of them.     Current living circumstances:  Patient is currently living at the [facility]; [facility].     Environment supportive of recovery:  Yes    Specific activities participating in which do not involve substance use:  Skiing  Going to movies  Reading  boating     Specific activities participating in which do involve substance use:  Going to bars  Wine tasting  Playing pool    People, things that threaten recovery: no    Expected family involvement during treatment services:  None    Current Legal Involvement:  Patient is currently on probation in [facility] for a DUI. (limited AMRITA in chart)    Legal Consequences related to use: DWI (3)    Occupational/Academic consequences related to use: Denies    Current ability to function in a work and/or education setting: Yes; although is not currently looking for work.     Current support network for recovery (including community-based recovery support): Patient reports that he has been to a few through the [employer]. A few times per week.     Do you belong to a [place]: No Which [place]?   Reside on reservation: No     Dimension VI Risk Rating: 3 Reason Risk Rating Assigned: Patient is currently unemployed, and so appears to lack structure for his daily life. He was able to identify hobbies and activities he enjoys, but it did not appear that he was actively engaging in them prior to going to treatment. Patient reports that he is currently living at a sober  house, and so has some support from his living environment. Patient reports that his family is supportive of his sobriety, and about half of his friends are as well. Some of the patient's friends could increase his risk of relapse as they drank together historically. Patient endorses pending legal consequences from a DWI that happened in August 2018. He denies any other current legal involvement.          DSM-V Criteria for Substance Abuse  Instructions:  Determine whether the client currently meets the criteria for a Substance Use Disorder using the diagnostic criteria in the  DSM-V, pp. 481-589. Current means during the most recent 12 months outside a facility that controls access to substances.    Category of substance Severity ICD-10 Code/DSM V Code  Alcohol Use Disorder Mild  Moderate  Severe (F10.10) (305.00)  (F10.20) (303.90)  (F10.20) (303.90)   Cannabis Use Disorder Mild  Moderate  Severe (F12.10) (305.20)  (F12.20) (304.30)  (F12.20) (304.30)   Hallucinogen Use Disorder Mild  Moderate  Severe (F16.10) (305.30)  (F16.20) (304.50)  (F16.20) (304.50)   Inhalant Use Disorder Mild  Moderate  Severe (F18.10) (305.90)  (F18.20) (304.60)  (F18.20) (304.60)   Opioid Use Disorder Mild  Moderate  Severe (F11.10) (305.50)  (F11.20) (304.00)  (F11.20) (304.00)   Sedative, Hypnotic, or Anxiolytic Use Disorder Mild  Moderate  Severe (F13.10) (305.40)  (F13.20) (304.10)  (F13.20) (304.10)   Stimulant Related Disorders Mild              Moderate              Severe   (F15.10) (305.70) Amphetamine type substance  (F14.10) (305.60) Cocaine  (F15.10) (305.70) Other or unspecified stimulant    (F15.20) (304.40) Amphetamine type substance  (F14.20) (304.20) Cocaine  (F15.20) (304.40) Other or unspecified stimulant    (F15.20) (304.40) Amphetamine type substance  (F14.20) (304.20) Cocaine  (F15.20) (304.40) Other or unspecified stimulant   DisorderTobacco use Disorder Mild  Moderate  Severe (Z72.0) (305.1)  (F17.200)  (305.1)  (F17.200) (305.1)   Other (or unknown) Substance Use Disorder Mild  Moderate  Severe (F19.10) (305.90)  (F19.20) (304.90)  (F19.20) (304.90)     Diagnostic Impression: Alcohol Use Disorder, Severe (F10.20)    Assessment Completed Within 3 Sessions of Admission: Yes  If NO, date assessment to be completed noted in Treatment Plan:       Signature of Counselor: ROSARIO Gilmore  Date and Time of Signature: 7/2/2019, 11:22 AM

## 2021-11-11 NOTE — PROGRESS NOTES
Appointments in Next Year    Nov 29, 2021  9:40 AM  PHYSICAL with Lisbeth Gonzalez MD  [facility] ([facility] ) [phone]          Willie Dougherty attended 3 hours of group on 7/17/2019.     The group topic was Relationships, patient was responsive to topic.     Patients engagement in the group session: medium     Total number of patients present 9.     Supervising MD: Dr. Sai Olea, [facility], 7/17/2019, 2:42 PM